# Patient Record
Sex: MALE | Race: WHITE | Employment: FULL TIME | URBAN - METROPOLITAN AREA
[De-identification: names, ages, dates, MRNs, and addresses within clinical notes are randomized per-mention and may not be internally consistent; named-entity substitution may affect disease eponyms.]

---

## 2020-10-11 ENCOUNTER — APPOINTMENT (OUTPATIENT)
Dept: GENERAL RADIOLOGY | Age: 65
DRG: 580 | End: 2020-10-11
Attending: EMERGENCY MEDICINE
Payer: COMMERCIAL

## 2020-10-11 ENCOUNTER — HOSPITAL ENCOUNTER (INPATIENT)
Age: 65
LOS: 5 days | Discharge: HOME HEALTH | DRG: 580 | End: 2020-10-16
Attending: EMERGENCY MEDICINE | Admitting: INTERNAL MEDICINE
Payer: COMMERCIAL

## 2020-10-11 DIAGNOSIS — N18.30 STAGE 3 CHRONIC KIDNEY DISEASE, UNSPECIFIED WHETHER STAGE 3A OR 3B CKD (HCC): ICD-10-CM

## 2020-10-11 DIAGNOSIS — L03.119 CELLULITIS AND ABSCESS OF HAND: ICD-10-CM

## 2020-10-11 DIAGNOSIS — L03.90 CELLULITIS, UNSPECIFIED CELLULITIS SITE: Primary | ICD-10-CM

## 2020-10-11 DIAGNOSIS — L02.519 CELLULITIS AND ABSCESS OF HAND: ICD-10-CM

## 2020-10-11 PROBLEM — I50.20 HFREF (HEART FAILURE WITH REDUCED EJECTION FRACTION) (HCC): Status: ACTIVE | Noted: 2020-10-11

## 2020-10-11 LAB
ALBUMIN SERPL-MCNC: 2.6 G/DL (ref 3.5–5)
ALBUMIN/GLOB SERPL: 0.7 {RATIO} (ref 1.1–2.2)
ALP SERPL-CCNC: 98 U/L (ref 45–117)
ALT SERPL-CCNC: 11 U/L (ref 12–78)
ANION GAP SERPL CALC-SCNC: 8 MMOL/L (ref 5–15)
AST SERPL-CCNC: 11 U/L (ref 15–37)
BASOPHILS # BLD: 0 K/UL (ref 0–0.1)
BASOPHILS NFR BLD: 1 % (ref 0–1)
BILIRUB SERPL-MCNC: 0.4 MG/DL (ref 0.2–1)
BUN SERPL-MCNC: 18 MG/DL (ref 6–20)
BUN/CREAT SERPL: 14 (ref 12–20)
CALCIUM SERPL-MCNC: 8.5 MG/DL (ref 8.5–10.1)
CHLORIDE SERPL-SCNC: 103 MMOL/L (ref 97–108)
CO2 SERPL-SCNC: 25 MMOL/L (ref 21–32)
COMMENT, HOLDF: NORMAL
CREAT SERPL-MCNC: 1.33 MG/DL (ref 0.7–1.3)
DIFFERENTIAL METHOD BLD: ABNORMAL
EOSINOPHIL # BLD: 0.1 K/UL (ref 0–0.4)
EOSINOPHIL NFR BLD: 2 % (ref 0–7)
ERYTHROCYTE [DISTWIDTH] IN BLOOD BY AUTOMATED COUNT: 12.9 % (ref 11.5–14.5)
GLOBULIN SER CALC-MCNC: 3.7 G/DL (ref 2–4)
GLUCOSE BLD STRIP.AUTO-MCNC: 131 MG/DL (ref 65–100)
GLUCOSE BLD STRIP.AUTO-MCNC: 210 MG/DL (ref 65–100)
GLUCOSE SERPL-MCNC: 267 MG/DL (ref 65–100)
HCT VFR BLD AUTO: 39.3 % (ref 36.6–50.3)
HGB BLD-MCNC: 13.5 G/DL (ref 12.1–17)
IMM GRANULOCYTES # BLD AUTO: 0 K/UL (ref 0–0.04)
IMM GRANULOCYTES NFR BLD AUTO: 1 % (ref 0–0.5)
LACTATE BLD-SCNC: 1.42 MMOL/L (ref 0.4–2)
LYMPHOCYTES # BLD: 1.3 K/UL (ref 0.8–3.5)
LYMPHOCYTES NFR BLD: 21 % (ref 12–49)
MCH RBC QN AUTO: 30.1 PG (ref 26–34)
MCHC RBC AUTO-ENTMCNC: 34.4 G/DL (ref 30–36.5)
MCV RBC AUTO: 87.7 FL (ref 80–99)
MONOCYTES # BLD: 0.7 K/UL (ref 0–1)
MONOCYTES NFR BLD: 12 % (ref 5–13)
NEUTS SEG # BLD: 3.9 K/UL (ref 1.8–8)
NEUTS SEG NFR BLD: 63 % (ref 32–75)
NRBC # BLD: 0 K/UL (ref 0–0.01)
NRBC BLD-RTO: 0 PER 100 WBC
PLATELET # BLD AUTO: 256 K/UL (ref 150–400)
PMV BLD AUTO: 9.7 FL (ref 8.9–12.9)
POTASSIUM SERPL-SCNC: 4.1 MMOL/L (ref 3.5–5.1)
PROT SERPL-MCNC: 6.3 G/DL (ref 6.4–8.2)
RBC # BLD AUTO: 4.48 M/UL (ref 4.1–5.7)
SAMPLES BEING HELD,HOLD: NORMAL
SERVICE CMNT-IMP: ABNORMAL
SERVICE CMNT-IMP: ABNORMAL
SODIUM SERPL-SCNC: 136 MMOL/L (ref 136–145)
WBC # BLD AUTO: 6.1 K/UL (ref 4.1–11.1)

## 2020-10-11 PROCEDURE — 65660000000 HC RM CCU STEPDOWN

## 2020-10-11 PROCEDURE — 36415 COLL VENOUS BLD VENIPUNCTURE: CPT

## 2020-10-11 PROCEDURE — 75810000289 HC I&D ABSCESS SIMP/COMP/MULT

## 2020-10-11 PROCEDURE — 99283 EMERGENCY DEPT VISIT LOW MDM: CPT

## 2020-10-11 PROCEDURE — 74011636637 HC RX REV CODE- 636/637: Performed by: INTERNAL MEDICINE

## 2020-10-11 PROCEDURE — 87077 CULTURE AEROBIC IDENTIFY: CPT

## 2020-10-11 PROCEDURE — 74011250637 HC RX REV CODE- 250/637: Performed by: INTERNAL MEDICINE

## 2020-10-11 PROCEDURE — 74011250636 HC RX REV CODE- 250/636: Performed by: EMERGENCY MEDICINE

## 2020-10-11 PROCEDURE — 0H9FXZZ DRAINAGE OF RIGHT HAND SKIN, EXTERNAL APPROACH: ICD-10-PCS | Performed by: EMERGENCY MEDICINE

## 2020-10-11 PROCEDURE — 73130 X-RAY EXAM OF HAND: CPT

## 2020-10-11 PROCEDURE — 82962 GLUCOSE BLOOD TEST: CPT

## 2020-10-11 PROCEDURE — 83605 ASSAY OF LACTIC ACID: CPT

## 2020-10-11 PROCEDURE — 87040 BLOOD CULTURE FOR BACTERIA: CPT

## 2020-10-11 PROCEDURE — 74011250636 HC RX REV CODE- 250/636: Performed by: INTERNAL MEDICINE

## 2020-10-11 PROCEDURE — 87205 SMEAR GRAM STAIN: CPT

## 2020-10-11 PROCEDURE — 80053 COMPREHEN METABOLIC PANEL: CPT

## 2020-10-11 PROCEDURE — 87186 SC STD MICRODIL/AGAR DIL: CPT

## 2020-10-11 PROCEDURE — 85025 COMPLETE CBC W/AUTO DIFF WBC: CPT

## 2020-10-11 PROCEDURE — 74011000250 HC RX REV CODE- 250: Performed by: INTERNAL MEDICINE

## 2020-10-11 RX ORDER — SULFAMETHOXAZOLE AND TRIMETHOPRIM 800; 160 MG/1; MG/1
1 TABLET ORAL 2 TIMES DAILY
COMMUNITY
Start: 2020-10-06 | End: 2020-10-16

## 2020-10-11 RX ORDER — ACETAMINOPHEN 325 MG/1
650 TABLET ORAL
Status: DISCONTINUED | OUTPATIENT
Start: 2020-10-11 | End: 2020-10-16 | Stop reason: HOSPADM

## 2020-10-11 RX ORDER — DULOXETIN HYDROCHLORIDE 60 MG/1
120 CAPSULE, DELAYED RELEASE ORAL
COMMUNITY

## 2020-10-11 RX ORDER — ROSUVASTATIN CALCIUM 10 MG/1
10 TABLET, COATED ORAL DAILY
Status: DISCONTINUED | OUTPATIENT
Start: 2020-10-11 | End: 2020-10-16 | Stop reason: HOSPADM

## 2020-10-11 RX ORDER — ACETAMINOPHEN 650 MG/1
650 SUPPOSITORY RECTAL
Status: DISCONTINUED | OUTPATIENT
Start: 2020-10-11 | End: 2020-10-16 | Stop reason: HOSPADM

## 2020-10-11 RX ORDER — CARVEDILOL 6.25 MG/1
6.25 TABLET ORAL 2 TIMES DAILY
COMMUNITY

## 2020-10-11 RX ORDER — INSULIN GLARGINE 100 [IU]/ML
25 INJECTION, SOLUTION SUBCUTANEOUS DAILY
Status: DISCONTINUED | OUTPATIENT
Start: 2020-10-12 | End: 2020-10-13

## 2020-10-11 RX ORDER — SODIUM CHLORIDE 0.9 % (FLUSH) 0.9 %
5-40 SYRINGE (ML) INJECTION EVERY 8 HOURS
Status: DISCONTINUED | OUTPATIENT
Start: 2020-10-11 | End: 2020-10-16 | Stop reason: HOSPADM

## 2020-10-11 RX ORDER — DEXTROSE 50 % IN WATER (D50W) INTRAVENOUS SYRINGE
12.5-25 AS NEEDED
Status: DISCONTINUED | OUTPATIENT
Start: 2020-10-11 | End: 2020-10-16 | Stop reason: HOSPADM

## 2020-10-11 RX ORDER — DOXYCYCLINE 100 MG/1
100 TABLET ORAL 2 TIMES DAILY
Status: ON HOLD | COMMUNITY
Start: 2020-10-08 | End: 2020-10-16 | Stop reason: SDUPTHER

## 2020-10-11 RX ORDER — DULOXETIN HYDROCHLORIDE 30 MG/1
120 CAPSULE, DELAYED RELEASE ORAL DAILY
Status: DISCONTINUED | OUTPATIENT
Start: 2020-10-11 | End: 2020-10-16 | Stop reason: HOSPADM

## 2020-10-11 RX ORDER — ROPINIROLE 1 MG/1
1 TABLET, FILM COATED ORAL
COMMUNITY

## 2020-10-11 RX ORDER — METRONIDAZOLE 500 MG/100ML
500 INJECTION, SOLUTION INTRAVENOUS EVERY 12 HOURS
Status: DISCONTINUED | OUTPATIENT
Start: 2020-10-11 | End: 2020-10-14

## 2020-10-11 RX ORDER — INSULIN DEGLUDEC INJECTION 100 U/ML
50 INJECTION, SOLUTION SUBCUTANEOUS EVERY EVENING
COMMUNITY

## 2020-10-11 RX ORDER — MAGNESIUM SULFATE 100 %
4 CRYSTALS MISCELLANEOUS AS NEEDED
Status: DISCONTINUED | OUTPATIENT
Start: 2020-10-11 | End: 2020-10-16 | Stop reason: HOSPADM

## 2020-10-11 RX ORDER — BUMETANIDE 1 MG/1
1 TABLET ORAL EVERY EVENING
COMMUNITY

## 2020-10-11 RX ORDER — POLYETHYLENE GLYCOL 3350 17 G/17G
17 POWDER, FOR SOLUTION ORAL DAILY PRN
Status: DISCONTINUED | OUTPATIENT
Start: 2020-10-11 | End: 2020-10-16 | Stop reason: HOSPADM

## 2020-10-11 RX ORDER — SODIUM CHLORIDE 0.9 % (FLUSH) 0.9 %
5-40 SYRINGE (ML) INJECTION AS NEEDED
Status: DISCONTINUED | OUTPATIENT
Start: 2020-10-11 | End: 2020-10-16 | Stop reason: HOSPADM

## 2020-10-11 RX ORDER — CARVEDILOL 12.5 MG/1
25 TABLET ORAL 2 TIMES DAILY WITH MEALS
Status: DISCONTINUED | OUTPATIENT
Start: 2020-10-11 | End: 2020-10-16 | Stop reason: HOSPADM

## 2020-10-11 RX ORDER — HYDROMORPHONE HYDROCHLORIDE 2 MG/ML
2 INJECTION, SOLUTION INTRAMUSCULAR; INTRAVENOUS; SUBCUTANEOUS
Status: COMPLETED | OUTPATIENT
Start: 2020-10-11 | End: 2020-10-11

## 2020-10-11 RX ORDER — INSULIN LISPRO 100 [IU]/ML
INJECTION, SOLUTION INTRAVENOUS; SUBCUTANEOUS
Status: DISCONTINUED | OUTPATIENT
Start: 2020-10-11 | End: 2020-10-16 | Stop reason: HOSPADM

## 2020-10-11 RX ORDER — VANCOMYCIN/0.9 % SOD CHLORIDE 1.5G/250ML
1500 PLASTIC BAG, INJECTION (ML) INTRAVENOUS EVERY 12 HOURS
Status: COMPLETED | OUTPATIENT
Start: 2020-10-12 | End: 2020-10-13

## 2020-10-11 RX ORDER — SACUBITRIL AND VALSARTAN 49; 51 MG/1; MG/1
1 TABLET, FILM COATED ORAL 2 TIMES DAILY
COMMUNITY
End: 2021-04-06

## 2020-10-11 RX ORDER — ROSUVASTATIN CALCIUM 10 MG/1
10 TABLET, COATED ORAL
Status: ON HOLD | COMMUNITY
End: 2021-09-02 | Stop reason: SDUPTHER

## 2020-10-11 RX ORDER — ROPINIROLE 1 MG/1
4 TABLET, FILM COATED ORAL
Status: DISCONTINUED | OUTPATIENT
Start: 2020-10-11 | End: 2020-10-16 | Stop reason: HOSPADM

## 2020-10-11 RX ORDER — ENOXAPARIN SODIUM 100 MG/ML
40 INJECTION SUBCUTANEOUS EVERY 24 HOURS
Status: DISCONTINUED | OUTPATIENT
Start: 2020-10-11 | End: 2020-10-16 | Stop reason: HOSPADM

## 2020-10-11 RX ADMIN — CEFEPIME HYDROCHLORIDE 2 G: 2 INJECTION, POWDER, FOR SOLUTION INTRAVENOUS at 16:12

## 2020-10-11 RX ADMIN — DULOXETINE HYDROCHLORIDE 120 MG: 30 CAPSULE, DELAYED RELEASE ORAL at 20:53

## 2020-10-11 RX ADMIN — SACUBITRIL AND VALSARTAN 1 TABLET: 49; 51 TABLET, FILM COATED ORAL at 21:22

## 2020-10-11 RX ADMIN — METRONIDAZOLE 500 MG: 500 INJECTION, SOLUTION INTRAVENOUS at 16:12

## 2020-10-11 RX ADMIN — HYDROMORPHONE HYDROCHLORIDE 2 MG: 2 INJECTION, SOLUTION INTRAMUSCULAR; INTRAVENOUS; SUBCUTANEOUS at 16:12

## 2020-10-11 RX ADMIN — ENOXAPARIN SODIUM 40 MG: 40 INJECTION SUBCUTANEOUS at 20:50

## 2020-10-11 RX ADMIN — Medication 10 ML: at 20:55

## 2020-10-11 RX ADMIN — INSULIN LISPRO 3 UNITS: 100 INJECTION, SOLUTION INTRAVENOUS; SUBCUTANEOUS at 18:23

## 2020-10-11 RX ADMIN — CARVEDILOL 25 MG: 12.5 TABLET, FILM COATED ORAL at 20:51

## 2020-10-11 RX ADMIN — ROSUVASTATIN CALCIUM 10 MG: 10 TABLET, COATED ORAL at 20:53

## 2020-10-11 RX ADMIN — VANCOMYCIN HYDROCHLORIDE 2000 MG: 10 INJECTION, POWDER, LYOPHILIZED, FOR SOLUTION INTRAVENOUS at 16:42

## 2020-10-11 RX ADMIN — ROPINIROLE HYDROCHLORIDE 4 MG: 1 TABLET, FILM COATED ORAL at 20:52

## 2020-10-11 NOTE — ROUTINE PROCESS
Bedside shift change report given to VICKI Arce (oncoming nurse) by Crystal Gilbert RN (offgoing nurse). Report included the following information SBAR, Kardex and Intake/Output.

## 2020-10-11 NOTE — PROGRESS NOTES
[facility] Dosing Services: Antimicrobial Stewardship Progress Note    Consult for antibiotic dosing of vancomycin by Dr. Giacomo Huang  Pharmacist reviewed antibiotic appropriateness for 60year old , male  for indication of spider bit cellulitis  Day of Therapy 1    Plan:  Vancomycin therapy:  Start Vancomycin therapy, with loading dose of 2gm then 1.5gm q12h. Dose calculated to approximate a therapeutic trough of  10-15mcg/mL. Plan for level: after 3rd maintenance dose  Pharmacy to follow daily and will make changes to dose and/or frequency based on clinical status. Date Dose & Interval Measured (mcg/mL) Extrapolated (mcg/mL)   ?10/11/20 ? 2gm x1 ? ?   ?10/12/20 ?1.5gm q12h ? ?   ? ? ? ? Other Antimicrobial  (not dosed by pharmacist)   Cefepime 2gm ivp q8h, metronidazole 500mg ivpb q12h   Cultures     Wound/blood cx's ordered   Serum Creatinine     Lab Results   Component Value Date/Time    Creatinine 1.33 (H) 10/11/2020 02:55 PM    Creatinine (POC) 1.1 07/01/2014 05:51 PM       Creatinine Clearance Estimated Creatinine Clearance: 71.5 mL/min (A) (based on SCr of 1.33 mg/dL (H)).      Procalcitonin  No results found for: PCT     Temp   98.3 °F (36.8 °C)    WBC   Lab Results   Component Value Date/Time    WBC 6.1 10/11/2020 02:55 PM       H/H   Lab Results   Component Value Date/Time    HGB 13.5 10/11/2020 02:55 PM      Platelets Lab Results   Component Value Date/Time    PLATELET 925 11/04/8481 02:55 PM            Pharmacist: LETY SparrowD Contact information:

## 2020-10-11 NOTE — ED TRIAGE NOTES
Pt here for what he believes was a spider bite to right hand. Seen at [facility] and Wednesday and given rocephin. Right hand in red, swollen, with large white blister to palm of hand. Denies known fevers.

## 2020-10-11 NOTE — PROGRESS NOTES
Admission Medication Reconciliation:     Information obtained from:    Patient  [address]:  YES    Comments/Recommendations:   Patient able to confirm name, , allergies, and preferred pharmacy  The patient is a poor historian as to his medications. Pharmacist called two pharmacies and spoke with the patient and his spouse to create the list below. The finalized list was reviewed with the patient. ¹[employer] pharmacy benefit data reflects medications filled and processed through the patient's insurance, however this data does NOT capture whether the medication was picked up or is currently being taken by the patient. Prior to Admission Medications   Prescriptions Last Dose Informant Taking? DULoxetine (Cymbalta) 60 mg capsule 10/10/2020 at Unknown time Self Yes   Sig: Take 120 mg by mouth daily. bumetanide (BUMEX) 1 mg tablet 10/10/2020 at Unknown time Self Yes   Sig: Take 1 mg by mouth daily. carvedilol (COREG PO) 10/10/2020 at Unknown time Self Yes   Sig: Take 25 mg by mouth two (2) times a day. doxycycline (ADOXA) 100 mg tablet 10/10/2020 at Unknown time Self Yes   Sig: Take 100 mg by mouth two (2) times a day. empagliflozin (Jardiance) 25 mg tablet 10/10/2020 at Unknown time Self Yes   Sig: Take 25 mg by mouth daily. insulin degludec [place] Slight FlexTouch U-100) 100 unit/mL (3 mL) inpn 10/11/2020 at am Self Yes   Si Units by SubCUTAneous route daily. rOPINIRole (Requip) 1 mg tablet 10/10/2020 at Unknown time Self Yes   Sig: Take 4 mg by mouth nightly. rosuvastatin (CRESTOR) 10 mg tablet 10/10/2020 at Unknown time Self Yes   Sig: Take 10 mg by mouth daily. sacubitriL-valsartan (Entresto) 49-51 mg tab tablet 10/10/2020 at Unknown time Self Yes   Sig: Take 1 Tab by mouth two (2) times a day.    trimethoprim-sulfamethoxazole (Bactrim DS) 160-800 mg per tablet 10/10/2020 at Unknown time Self Yes   Sig: Take 1 Tab by mouth two (2) times a day.      Facility-Administered Medications: None          Please contact the main inpatient pharmacy with any questions or concerns at [phone] and we will direct you to the clinical pharmacist covering this patient's care while in-house.    Marylu Garland, JeannetteD, BCPS

## 2020-10-11 NOTE — H&P
[facility]  [address] [address]  [phone]    Hospitalist Admission Note      NAME:  Yazan Rae   :   1955   MRN:  [id number]     PCP:  Petra Sotelo MD     Date of Service/Time:  10/11/2020 4:40 PM         Assessment / Plan:       60 y.o. male with hx of HFrEF, HTN, DM, CKD3 presenting with worsening R hand cellulitis failing outpatient abx. Cellulitis and abscess of hand: R palmar puncture. Pt thought it might have been a spider bite or nail puncture as he was working in the yard picking up piles of wood. No improvement despite IM CTX x 2, Bactrim, followed by doxycycline. Bedside I&D done by ED attending, follow up on wound cultures. Start IV vanc, cefepime, and Flagyl. MRI hand to rule out tenosynovitis and less likely osteomyelitis. Ortho consult      HFrEF (heart failure with reduced ejection fraction): per pt, EF ~15-20% however has not been checked in years. No recent CP, SOB, LE edema, orthopnea, PND and appears stable. Check EKG; continue Entresto, and BB (once dose confirmed by pharmacy, as pt does not know). If surgical intervention is necessary, consider cardiology consult for pre-op evaluation      Type II diabetes mellitus: appears uncontrolled. Takes Tresiba 50 units at home, will use Lantus 25 units here, and titrate as appropriate. Start SSI and send A1c      HTN (hypertension): BP controlled. On Entresto, and Coreg (according to pt, however he does not know dose). Pharmacy consult for medication reconciliation       CKD (chronic kidney disease) stage 3, GFR 30-59 ml/min ():stable, follow on IV abx. Renally dose meds. Avoid nephrotoxins      Depression and anxiety: resume home Cymbalta once dose verified      Unspecified sleep apnea / obesity: lost 70 lbs and not on CPAP. Body mass index is 32.55 kg/m². Code Status: FULL     Surrogate decision maker: wife      ED notes, lab results, and imaging studies reviewed. Total time spent with patient: 79 Minutes   Time spent in the care of this patient included reviewing records, discussing with nursing, obtaining history and examining the patient, and discussing treatment plans, with >50% time spent counseling/coordinating care    Risk of deterioration: High                 Care Plan discussed with: ED provider, Patient, Family, Nursing Staff and >50% of time spent in counseling and coordination of care    Discussed:  Care Plan and D/C Planning    Prophylaxis:  Lovenox    Disposition:  Home w/Family                 Subjective:     CHIEF COMPLAINT: R hand swelling and redness    HISTORY OF PRESENT ILLNESS:     Mr. Deandre Yepez is a 60 y.o. male w/ hx of HFrEF, HTN, DM, CKD3 who presents with right hand cellulitis. Started ~8 days ago, after working in the yard. Felt a sharp pinch in his right palm and saw a spider crawling by. Alternatively it could have been a nail. Did get a tetanus shot at urgent care. According to pt, he was given IM CTX 250mg, followed by 2gm the following day. Discharged home on Bactrim, and with no improvement later added doxycycline. Due to worsening of right hand swelling presented to ED for further evaluation. Does report chills but no fevers. Having trouble making a fist, associated with pain and no significant drainage. ED workup included R hand XR showing soft tissue swelling and anterior soft tissue gas. Wound was I&D'd by ER attending; cultures sent. Mr. Deandre Yepez is admitted for further evaluation and management.       Past Medical History:   Diagnosis Date    Diabetes ([facility]     Type 2    Diabetic foot ulcer ([facility] 7/2/2014    Foot ulcer due to secondary DM ([address]     Hypertension     Psychiatric disorder     depression and anxiety    Restless leg syndrome     Streptococcal bacteremia     elbow    Type II or unspecified type diabetes mellitus without mention of complication, uncontrolled ([address] 7/2/2014    Unspecified sleep apnea     lost weight        Past Surgical History:   Procedure Laterality Date    HX ORTHOPAEDIC      bone spur right foot.  HX UROLOGICAL      hydrocele repair left testicle    HX WISDOM TEETH EXTRACTION         Social History     Tobacco Use    Smoking status: Current Some Day Smoker     Packs/day: 0.25     Years: 20.00     Pack years: 5.00    Tobacco comment: cigars   Substance Use Topics    Alcohol use: Yes     Alcohol/week: 3.3 standard drinks     Types: 4 Cans of beer per week        Family History: family history of DM    Allergies   Allergen Reactions    Penicillins Hives        Prior to Admission medications    Medication Sig Start Date End Date Taking? Authorizing Provider   amLODIPine (NORVASC) 10 mg tablet Take 1 Tab by mouth daily. 7/14/14   Gilles Ayoub MD   glipiZIDE (GLUCOTROL) 5 mg tablet Take 1 Tab by mouth Daily (before breakfast). 7/14/14   Gilles Ayoub MD   hydrALAZINE (APRESOLINE) 25 mg tablet Take 1 Tab by mouth three (3) times daily. 7/14/14   Gilles Ayoub MD   oxyCODONE-acetaminophen (PERCOCET) 5-325 mg per tablet Take 2 Tabs by mouth every four (4) hours as needed. 7/14/14   Gilles Ayoub MD   DULoxetine (CYMBALTA) 30 mg capsule Take 90 mg by mouth daily. Other, MD Jhonny   rOPINIRole (REQUIP) 0.5 mg tablet Take 1.5 mg by mouth nightly. Other, MD Jhonny   dextroamphetamine-amphetamine (ADDERALL) 10 mg tablet Take 20 mg by mouth two (2) times a day. Provider, Historical   insulin glargine (LANTUS) 100 unit/mL injection 30 Units by SubCUTAneous route nightly.     Provider, Historical       Review of Systems:  (bold if positive, if negative)    Gen:  chills, fatigueEyes:  ENT:  CVS:  Pulm:  GI:  :  MS:  PainweaknessswellingSkin:  erythemawoundPsych:  Endo:  Hem:  Renal:  Neuro:            Objective:      VITALS:    Vital signs reviewed; most recent are:    Visit Vitals  /82 (BP 1 Location: Left arm, BP Patient Position: Sitting)   Pulse 75   Temp 98.3 °F (36.8 °C)   Resp 15 Ht 6' (1.829 m)   Wt 108.9 kg (240 lb)   SpO2 97%   BMI 32.55 kg/m²     SpO2 Readings from Last 6 Encounters:   10/11/20 97%   07/14/14 93%        No intake or output data in the 24 hours ending 10/11/20 1640         Exam:     Physical Exam:    Gen:  Well-developed, well-nourished, in no acute distress. Pleasant. Wife at bedside  HEENT:  No scleral icterus, hearing intact to voice  Neck:  Supple, without masses  Resp:  No accessory muscle use. CTAB without wheezing, rales, rhonchi  Card: RRR. Normal S1 and S2 without murmurs, rubs, or gallops. No peripheral lower extremity edema. No JVD. Peripheral pulses in tact. Abd:  Normoactive bowel sounds. Soft, non-tender, non-distended. No rebound, no guarding. No appreciable hepatosplenomegaly   Musc: R hand swelling, ROM difficulties with flexion of fingers, especially R 5th digit  Skin:                 Neuro:  Cranial nerves are grossly intact, no focal motor weakness, follows commands appropriately  Psych:  Good insight, normal affect. Alert, oriented x 3. Answers questions appropriately       Labs:    Recent Labs     10/11/20  1455   WBC 6.1   HGB 13.5   HCT 39.3        Recent Labs     10/11/20  1455      K 4.1      CO2 25   *   BUN 18   CREA 1.33*   CA 8.5   ALB 2.6*   ALT 11*     No components found for: GLPOC  No results for input(s): PH, PCO2, PO2, HCO3, FIO2 in the last 72 hours. No results for input(s): INR, INREXT in the last 72 hours. No results found for: SDES  Lab Results   Component Value Date/Time    Culture result: NO ANAEROBES ISOLATED 07/08/2014 08:37 AM    Culture result: HEAVY 07/08/2014 08:37 AM    Culture result: METHICILLIN RESISTANT STAPHYLOCOCCUS AUREUS 07/08/2014 08:37 AM     All other current labs reviewed in the computer. Imaging/Studies:    Xr Hand Rt Min 3 V    Result Date: 10/11/2020  IMPRESSION: Soft tissue swelling and anterior soft tissue gas. No fracture or osteomyelitis.     Imaging personally reviewed.     ___________________________________________________    Attending Physician: Candi Bustos MD

## 2020-10-11 NOTE — ED NOTES
TRANSFER - OUT REPORT:    Verbal report given to Aria RN (name) on Yazan Rae  being transferred to 52(unit) for routine progression of care       Report consisted of patients Situation, Background, Assessment and   Recommendations(SBAR). Information from the following report(s) SBAR and MAR was reviewed with the receiving nurse. Lines:   Peripheral IV 10/11/20 Right Forearm (Active)   Site Assessment Clean, dry, & intact 10/11/20 1501   Dressing Status Clean, dry, & intact 10/11/20 1501        Opportunity for questions and clarification was provided.

## 2020-10-11 NOTE — ED PROVIDER NOTES
HPI the patient had a spider bite to his right palm 6 days ago and it has subsequently become infected. [facility], given Rocephin and started on Bactrim. 3 days ago he went back and was given another injection of Rocephin and doxycycline was added. He has had worsening swelling and pain of the right palm since then. He denies having fever or vomiting. Past Medical History:   Diagnosis Date    Diabetes ([facility]     Type 2    Diabetic foot ulcer ([facility] 7/2/2014    Foot ulcer due to secondary DM ([facility]     Hypertension     Psychiatric disorder     depression and anxiety    Restless leg syndrome     Streptococcal bacteremia     elbow    Type II or unspecified type diabetes mellitus without mention of complication, uncontrolled ([facility] 7/2/2014    Unspecified sleep apnea     lost weight       Past Surgical History:   Procedure Laterality Date    HX ORTHOPAEDIC      bone spur right foot.  HX UROLOGICAL      hydrocele repair left testicle    HX WISDOM TEETH EXTRACTION           No family history on file. Social History     Socioeconomic History    Marital status:      Spouse name: Not on file    Number of children: Not on file    Years of education: Not on file    Highest education level: Not on file   Occupational History    Not on file   Social Needs    Financial resource strain: Not on file    Food insecurity     Worry: Not on file     Inability: Not on file    Transportation needs     Medical: Not on file     Non-medical: Not on file   Tobacco Use    Smoking status: Current Some Day Smoker     Packs/day: 0.25     Years: 20.00     Pack years: 5.00    Tobacco comment: cigars   Substance and Sexual Activity    Alcohol use:  Yes     Alcohol/week: 3.3 standard drinks     Types: 4 Cans of beer per week    Drug use: No    Sexual activity: Not on file   Lifestyle    Physical activity     Days per week: Not on file     Minutes per session: Not on file    Stress: Not on file   Relationships    Social connections     Talks on phone: Not on file     Gets together: Not on file     Attends Yarsanism service: Not on file     Active member of club or organization: Not on file     Attends meetings of clubs or organizations: Not on file     Relationship status: Not on file    Intimate partner violence     Fear of current or ex partner: Not on file     Emotionally abused: Not on file     Physically abused: Not on file     Forced sexual activity: Not on file   Other Topics Concern    Not on file   Social History Narrative    Not on file         ALLERGIES: Penicillins    Review of Systems   Constitutional: Negative for fever. HENT: Negative for voice change. Eyes: Negative for pain. Respiratory: Negative for cough and shortness of breath. Cardiovascular: Negative for chest pain. Gastrointestinal: Negative for abdominal pain, nausea and vomiting. Genitourinary: Negative for flank pain. Musculoskeletal: Negative for back pain. Skin: Positive for color change. Negative for rash. Neurological: Negative for headaches. Psychiatric/Behavioral: Negative for confusion. Vitals:    10/11/20 1415   BP: 139/82   Pulse: 75   Resp: 15   Temp: 98.3 °F (36.8 °C)   SpO2: 97%   Weight: 108.9 kg (240 lb)   Height: 6' (1.829 m)            Physical Exam  Constitutional:       General: He is not in acute distress. Appearance: He is well-developed. HENT:      Head: Normocephalic. Neck:      Musculoskeletal: Normal range of motion. Cardiovascular:      Rate and Rhythm: Normal rate. Pulmonary:      Effort: Pulmonary effort is normal.   Musculoskeletal: Normal range of motion. Skin:     General: Skin is warm and dry. Capillary Refill: Capillary refill takes less than 2 seconds. Comments: There is redness of the right palm with a 2 inch x 1 inch area of pus. There is mild to moderate swelling of the palm and hand dorsum and it is warm to touch. Neurological:      Mental Status: He is alert. Psychiatric:         Behavior: Behavior normal.          [facility]       I&D Abcess Simple    Date/Time: 10/11/2020 3:19 PM  Performed by: Momo Howard MD  Authorized by: Momo Howard MD     Consent:     Consent obtained:  Verbal    Consent given by:  Patient  Location:     Type:  Fluid collection  Pre-procedure details:     Skin preparation:  Betadine  Anesthesia (see MAR for exact dosages): Anesthesia method:  None  Procedure type:     Complexity:  Simple  Procedure details:     Incision types:  Single straight    Drainage:  Purulent    Drainage amount:  Scant    Wound treatment:  Wound left open  Post-procedure details:     Patient tolerance of procedure: Tolerated well, no immediate complications      Perfect Serve Consult for Admission  3:37 PM    ED Room Number: ER19/19  Patient Name and age:  Madhavi Joseph 60 y.o.  male  Working Diagnosis:   1. Cellulitis, unspecified cellulitis site        COVID-19 Suspicion:  no  Sepsis present:  no  Reassessment needed: no  Code Status:  Full Code  Readmission: no  Isolation Requirements:  no  Recommended Level of Care:  med/surg  Department:[facility] ED - [phone]  Other:  Pt is dm. Thinks a spider bit him in r palm 6 days ago . Has been given 2 im shots [facility] by [facility] and bactrim /doxy. Hand is worse.

## 2020-10-12 ENCOUNTER — APPOINTMENT (OUTPATIENT)
Dept: CT IMAGING | Age: 65
DRG: 580 | End: 2020-10-12
Attending: PHYSICIAN ASSISTANT
Payer: COMMERCIAL

## 2020-10-12 ENCOUNTER — APPOINTMENT (OUTPATIENT)
Dept: MRI IMAGING | Age: 65
DRG: 580 | End: 2020-10-12
Attending: INTERNAL MEDICINE
Payer: COMMERCIAL

## 2020-10-12 ENCOUNTER — ANESTHESIA EVENT (OUTPATIENT)
Dept: SURGERY | Age: 65
DRG: 580 | End: 2020-10-12
Payer: COMMERCIAL

## 2020-10-12 LAB
ALBUMIN SERPL-MCNC: 2.5 G/DL (ref 3.5–5)
ALBUMIN/GLOB SERPL: 0.7 {RATIO} (ref 1.1–2.2)
ALP SERPL-CCNC: 99 U/L (ref 45–117)
ALT SERPL-CCNC: 11 U/L (ref 12–78)
ANION GAP SERPL CALC-SCNC: 5 MMOL/L (ref 5–15)
AST SERPL-CCNC: 15 U/L (ref 15–37)
BASOPHILS # BLD: 0 K/UL (ref 0–0.1)
BASOPHILS NFR BLD: 1 % (ref 0–1)
BILIRUB SERPL-MCNC: 0.4 MG/DL (ref 0.2–1)
BUN SERPL-MCNC: 20 MG/DL (ref 6–20)
BUN/CREAT SERPL: 18 (ref 12–20)
CALCIUM SERPL-MCNC: 8.2 MG/DL (ref 8.5–10.1)
CHLORIDE SERPL-SCNC: 103 MMOL/L (ref 97–108)
CO2 SERPL-SCNC: 26 MMOL/L (ref 21–32)
CREAT SERPL-MCNC: 1.13 MG/DL (ref 0.7–1.3)
CRP SERPL-MCNC: 9 MG/DL (ref 0–0.6)
DIFFERENTIAL METHOD BLD: ABNORMAL
EOSINOPHIL # BLD: 0 K/UL (ref 0–0.4)
EOSINOPHIL NFR BLD: 1 % (ref 0–7)
ERYTHROCYTE [DISTWIDTH] IN BLOOD BY AUTOMATED COUNT: 12.4 % (ref 11.5–14.5)
ERYTHROCYTE [SEDIMENTATION RATE] IN BLOOD: 75 MM/HR (ref 0–20)
EST. AVERAGE GLUCOSE BLD GHB EST-MCNC: 220 MG/DL
GLOBULIN SER CALC-MCNC: 3.7 G/DL (ref 2–4)
GLUCOSE BLD STRIP.AUTO-MCNC: 114 MG/DL (ref 65–100)
GLUCOSE BLD STRIP.AUTO-MCNC: 117 MG/DL (ref 65–100)
GLUCOSE BLD STRIP.AUTO-MCNC: 190 MG/DL (ref 65–100)
GLUCOSE BLD STRIP.AUTO-MCNC: 201 MG/DL (ref 65–100)
GLUCOSE SERPL-MCNC: 128 MG/DL (ref 65–100)
HBA1C MFR BLD: 9.3 % (ref 4–5.6)
HCT VFR BLD AUTO: 38.8 % (ref 36.6–50.3)
HGB BLD-MCNC: 13.1 G/DL (ref 12.1–17)
IMM GRANULOCYTES # BLD AUTO: 0.1 K/UL (ref 0–0.04)
IMM GRANULOCYTES NFR BLD AUTO: 1 % (ref 0–0.5)
LYMPHOCYTES # BLD: 1 K/UL (ref 0.8–3.5)
LYMPHOCYTES NFR BLD: 12 % (ref 12–49)
MAGNESIUM SERPL-MCNC: 2.1 MG/DL (ref 1.6–2.4)
MCH RBC QN AUTO: 30.2 PG (ref 26–34)
MCHC RBC AUTO-ENTMCNC: 33.8 G/DL (ref 30–36.5)
MCV RBC AUTO: 89.4 FL (ref 80–99)
MONOCYTES # BLD: 0.7 K/UL (ref 0–1)
MONOCYTES NFR BLD: 9 % (ref 5–13)
NEUTS SEG # BLD: 5.9 K/UL (ref 1.8–8)
NEUTS SEG NFR BLD: 76 % (ref 32–75)
NRBC # BLD: 0 K/UL (ref 0–0.01)
NRBC BLD-RTO: 0 PER 100 WBC
PHOSPHATE SERPL-MCNC: 3.2 MG/DL (ref 2.6–4.7)
PLATELET # BLD AUTO: 255 K/UL (ref 150–400)
PMV BLD AUTO: 9.3 FL (ref 8.9–12.9)
POTASSIUM SERPL-SCNC: 4.1 MMOL/L (ref 3.5–5.1)
PROT SERPL-MCNC: 6.2 G/DL (ref 6.4–8.2)
RBC # BLD AUTO: 4.34 M/UL (ref 4.1–5.7)
SERVICE CMNT-IMP: ABNORMAL
SODIUM SERPL-SCNC: 134 MMOL/L (ref 136–145)
WBC # BLD AUTO: 7.6 K/UL (ref 4.1–11.1)

## 2020-10-12 PROCEDURE — 74011250637 HC RX REV CODE- 250/637: Performed by: INTERNAL MEDICINE

## 2020-10-12 PROCEDURE — 73201 CT UPPER EXTREMITY W/DYE: CPT

## 2020-10-12 PROCEDURE — 82962 GLUCOSE BLOOD TEST: CPT

## 2020-10-12 PROCEDURE — 65660000000 HC RM CCU STEPDOWN

## 2020-10-12 PROCEDURE — 84100 ASSAY OF PHOSPHORUS: CPT

## 2020-10-12 PROCEDURE — 77030010777 HC CRDL FT DERY -B

## 2020-10-12 PROCEDURE — 74011636637 HC RX REV CODE- 636/637: Performed by: INTERNAL MEDICINE

## 2020-10-12 PROCEDURE — 74011250636 HC RX REV CODE- 250/636: Performed by: INTERNAL MEDICINE

## 2020-10-12 PROCEDURE — 36415 COLL VENOUS BLD VENIPUNCTURE: CPT

## 2020-10-12 PROCEDURE — 74011000636 HC RX REV CODE- 636: Performed by: RADIOLOGY

## 2020-10-12 PROCEDURE — 83036 HEMOGLOBIN GLYCOSYLATED A1C: CPT

## 2020-10-12 PROCEDURE — 83735 ASSAY OF MAGNESIUM: CPT

## 2020-10-12 PROCEDURE — 80053 COMPREHEN METABOLIC PANEL: CPT

## 2020-10-12 PROCEDURE — 85025 COMPLETE CBC W/AUTO DIFF WBC: CPT

## 2020-10-12 PROCEDURE — 86140 C-REACTIVE PROTEIN: CPT

## 2020-10-12 PROCEDURE — 74011000250 HC RX REV CODE- 250: Performed by: INTERNAL MEDICINE

## 2020-10-12 PROCEDURE — 85652 RBC SED RATE AUTOMATED: CPT

## 2020-10-12 RX ORDER — BUMETANIDE 1 MG/1
1 TABLET ORAL DAILY
Status: DISCONTINUED | OUTPATIENT
Start: 2020-10-12 | End: 2020-10-16 | Stop reason: HOSPADM

## 2020-10-12 RX ADMIN — CARVEDILOL 25 MG: 12.5 TABLET, FILM COATED ORAL at 08:25

## 2020-10-12 RX ADMIN — BUMETANIDE 1 MG: 1 TABLET ORAL at 12:07

## 2020-10-12 RX ADMIN — VANCOMYCIN HYDROCHLORIDE 1500 MG: 10 INJECTION, POWDER, LYOPHILIZED, FOR SOLUTION INTRAVENOUS at 17:37

## 2020-10-12 RX ADMIN — METRONIDAZOLE 500 MG: 500 INJECTION, SOLUTION INTRAVENOUS at 04:06

## 2020-10-12 RX ADMIN — ACETAMINOPHEN 650 MG: 325 TABLET ORAL at 08:29

## 2020-10-12 RX ADMIN — IOPAMIDOL 100 ML: 612 INJECTION, SOLUTION INTRAVENOUS at 15:34

## 2020-10-12 RX ADMIN — CEFEPIME HYDROCHLORIDE 2 G: 2 INJECTION, POWDER, FOR SOLUTION INTRAVENOUS at 08:25

## 2020-10-12 RX ADMIN — DULOXETINE HYDROCHLORIDE 120 MG: 30 CAPSULE, DELAYED RELEASE ORAL at 21:30

## 2020-10-12 RX ADMIN — ROPINIROLE HYDROCHLORIDE 4 MG: 1 TABLET, FILM COATED ORAL at 21:30

## 2020-10-12 RX ADMIN — CEFEPIME HYDROCHLORIDE 2 G: 2 INJECTION, POWDER, FOR SOLUTION INTRAVENOUS at 16:28

## 2020-10-12 RX ADMIN — INSULIN LISPRO 3 UNITS: 100 INJECTION, SOLUTION INTRAVENOUS; SUBCUTANEOUS at 17:00

## 2020-10-12 RX ADMIN — METRONIDAZOLE 500 MG: 500 INJECTION, SOLUTION INTRAVENOUS at 16:28

## 2020-10-12 RX ADMIN — ROSUVASTATIN CALCIUM 10 MG: 10 TABLET, COATED ORAL at 21:30

## 2020-10-12 RX ADMIN — ENOXAPARIN SODIUM 40 MG: 40 INJECTION SUBCUTANEOUS at 21:30

## 2020-10-12 RX ADMIN — SACUBITRIL AND VALSARTAN 1 TABLET: 49; 51 TABLET, FILM COATED ORAL at 21:34

## 2020-10-12 RX ADMIN — Medication 10 ML: at 00:11

## 2020-10-12 RX ADMIN — SACUBITRIL AND VALSARTAN 1 TABLET: 49; 51 TABLET, FILM COATED ORAL at 08:29

## 2020-10-12 RX ADMIN — CEFEPIME HYDROCHLORIDE 2 G: 2 INJECTION, POWDER, FOR SOLUTION INTRAVENOUS at 00:10

## 2020-10-12 RX ADMIN — VANCOMYCIN HYDROCHLORIDE 1500 MG: 10 INJECTION, POWDER, LYOPHILIZED, FOR SOLUTION INTRAVENOUS at 04:06

## 2020-10-12 RX ADMIN — Medication 10 ML: at 21:30

## 2020-10-12 RX ADMIN — CARVEDILOL 25 MG: 12.5 TABLET, FILM COATED ORAL at 16:28

## 2020-10-12 NOTE — PROGRESS NOTES
Patient has [address] defibrillator. Paperwork was faxed to Dr. Evelio Dubon at [address] [employer] today and awaiting to have cardiology form back. Pacemaker rep has to be presented to intergrade his device. Primary nurse was informed.

## 2020-10-12 NOTE — PROGRESS NOTES
Bedside and Verbal shift change report given to Dorothy RN (oncoming nurse) by Alessia Belle RN (offgoing nurse). Report included the following information SBAR, Kardex, MAR, Accordion and Recent Results.

## 2020-10-12 NOTE — CONSULTS
ORTHOPEDIC SURGERY CONSULT    Subjective:     Date of Consultation:  October 12, 2020    Referring Physician:  Dr. Dayna Lee is a 60 y.o. male who is being seen for right volar hand infection. He is a right hand dominant male. He presented to the [facility] yesterday complaining of worsening erythema of the right volar hand after a potential spider bite verus foreign body 10 days ago. He has a past medical history of CKD stage 3, DM-2, abscess of neck requiring I and D, HTN, diabetic foot ulcer, HRrEF, and depression/anxiety. Patient apparently was helping a friend move some furniture 10 days ago when his right hand got punctured by something. Patient does not know it is was a nail, but said he saw a spider walk away from where he was grabbing the furniture. He reports worsening erythema that occurred within 24 hours of the injury. He was seen by an outpatient clinic and had IM ceftriaxone x 2, bactrim and then doxycycline. There was no improvement in his hand erythema or function. He was admitted to the [facility] last night after superficial I and D by ER attending. Patient Active Problem List    Diagnosis Date Noted    HFrEF (heart failure with reduced ejection fraction) ([facility] 10/11/2020    Cellulitis and abscess of hand 10/11/2020    Psychiatric disorder     Unspecified sleep apnea     CKD (chronic kidney disease) stage 3, GFR 30-59 ml/min     Cellulitis of scalp 07/02/2014    Type II diabetes mellitus ([facility] 07/02/2014    HTN (hypertension) 07/02/2014    RLS (restless legs syndrome) 07/02/2014    Diabetic foot ulcer ([facility] 07/02/2014     No family history on file. Social History     Tobacco Use    Smoking status: Current Some Day Smoker     Packs/day: 0.25     Years: 20.00     Pack years: 5.00    Tobacco comment: cigars   Substance Use Topics    Alcohol use:  Yes     Alcohol/week: 3.3 standard drinks     Types: 4 Cans of beer per week     Past Medical History:   Diagnosis Date    Diabetes ([facility]     Type 2    Diabetic foot ulcer ([facility] 7/2/2014    Foot ulcer due to secondary DM ([facility]     Hypertension     Psychiatric disorder     depression and anxiety    Restless leg syndrome     Streptococcal bacteremia     elbow    Type II or unspecified type diabetes mellitus without mention of complication, uncontrolled ([facility] 7/2/2014    Unspecified sleep apnea     lost weight      Past Surgical History:   Procedure Laterality Date    HX ORTHOPAEDIC      bone spur right foot.  HX UROLOGICAL      hydrocele repair left testicle    HX WISDOM TEETH EXTRACTION        Prior to Admission medications    Medication Sig Start Date End Date Taking? Authorizing Provider   DULoxetine (Cymbalta) 60 mg capsule Take 120 mg by mouth daily. Yes Provider, Historical   insulin degludec Donnie Canary Calendars FlexTouch U-100) 100 unit/mL (3 mL) inpn 48 Units by SubCUTAneous route daily. Yes Provider, Historical   empagliflozin (Jardiance) 25 mg tablet Take 25 mg by mouth daily. Yes Provider, Historical   sacubitriL-valsartan (Entresto) 49-51 mg tab tablet Take 1 Tab by mouth two (2) times a day. Yes Provider, Historical   carvedilol (COREG PO) Take 25 mg by mouth two (2) times a day. Yes Provider, Historical   bumetanide (BUMEX) 1 mg tablet Take 1 mg by mouth daily. Yes Provider, Historical   rosuvastatin (CRESTOR) 10 mg tablet Take 10 mg by mouth daily. Yes Provider, Historical   trimethoprim-sulfamethoxazole (Bactrim DS) 160-800 mg per tablet Take 1 Tab by mouth two (2) times a day. 10/6/20  Yes Provider, Historical   doxycycline (ADOXA) 100 mg tablet Take 100 mg by mouth two (2) times a day. 10/8/20  Yes Provider, Historical   rOPINIRole (Requip) 1 mg tablet Take 4 mg by mouth nightly.    Yes Provider, Historical     Current Facility-Administered Medications   Medication Dose Route Frequency    bumetanide (BUMEX) tablet 1 mg  1 mg Oral DAILY    [START ON 10/13/2020] Vancomycin trough 10/13 prior to 0500 dose Other ONCE    cefepime (MAXIPIME) 2 g in sterile water (preservative free) 10 mL IV syringe  2 g IntraVENous Q8H    metroNIDAZOLE (FLAGYL) IVPB premix 500 mg  500 mg IntraVENous Q12H    vancomycin (VANCOCIN) 1500 mg in  ml infusion  1,500 mg IntraVENous Q12H    sodium chloride (NS) flush 5-40 mL  5-40 mL IntraVENous Q8H    sodium chloride (NS) flush 5-40 mL  5-40 mL IntraVENous PRN    acetaminophen (TYLENOL) tablet 650 mg  650 mg Oral Q6H PRN    Or    acetaminophen (TYLENOL) suppository 650 mg  650 mg Rectal Q6H PRN    polyethylene glycol (MIRALAX) packet 17 g  17 g Oral DAILY PRN    insulin lispro (HUMALOG) injection   SubCUTAneous QID WITH MEALS    glucose chewable tablet 16 g  4 Tab Oral PRN    dextrose (D50W) injection syrg 12.5-25 g  12.5-25 g IntraVENous PRN    glucagon (GLUCAGEN) injection 1 mg  1 mg IntraMUSCular PRN    sacubitriL-valsartan (ENTRESTO) 49-51 mg tablet 1 Tab  1 Tab Oral Q12H    insulin glargine (LANTUS) injection 25 Units  25 Units SubCUTAneous DAILY    enoxaparin (LOVENOX) injection 40 mg  40 mg SubCUTAneous Q24H    rOPINIRole (REQUIP) tablet 4 mg  4 mg Oral QHS    rosuvastatin (CRESTOR) tablet 10 mg  10 mg Oral DAILY    DULoxetine (CYMBALTA) capsule 120 mg  120 mg Oral DAILY    carvediloL (COREG) tablet 25 mg  25 mg Oral BID WITH MEALS     Allergies   Allergen Reactions    Penicillins Hives        Review of Systems:  Pertinent items are noted in HPI. Objective:     Patient Vitals for the past 8 hrs:   BP Temp Pulse Resp SpO2   10/12/20 1154 128/69 97.7 °F (36.5 °C) (!) 54 18 98 %   10/12/20 0742 (!) 148/72 97.5 °F (36.4 °C) 60 18 98 %   10/12/20 0700   (!) 57       Temp (24hrs), Av.2 °F (36.8 °C), Min:97.5 °F (36.4 °C), Max:98.9 °F (37.2 °C)        EXAM: GEN: Well appearing male in NAD  PSYCH:  AAO x 4  MUSC/INTEG/NEURO: right hand with volar superificial abscess. There is a small pustule medial to main area of fluctuance.   There is significant erythema in the palmar surface of the hand with expansion to the small finger PIP volarly. Digits 2-5 held in flexion. There is diffuse hand edema with fusiform swelling of 2-5. No wrist effusion. Thenar eminance is unremarkable. Hypothenar is tense, but compressible and no palpable area of fluctuance. Can not express fluid from superificial I and D site. Moderate pain with passive extension of the 5th digit. There is loss of sensation over the ulnar aspect of the palm. BCR of all digits. COMPARISON: None.     FINDINGS: Three views of the right hand demonstrate diffuse soft tissue  swelling. Soft tissue gas is anterior to the third, fourth, and fifth  metacarpals. Normal bone mineralization. No fracture or cortical erosion. Joints  are within normal limits.     IMPRESSION  IMPRESSION:      Soft tissue swelling and anterior soft tissue gas. No fracture or osteomyelitis. Data Review   Recent Results (from the past 24 hour(s))   POC LACTIC ACID    Collection Time: 10/11/20  2:51 PM   Result Value Ref Range    Lactic Acid (POC) 1.42 0.40 - 2.00 mmol/L   CBC WITH AUTOMATED DIFF    Collection Time: 10/11/20  2:55 PM   Result Value Ref Range    WBC 6.1 4.1 - 11.1 K/uL    RBC 4.48 4.10 - 5.70 M/uL    HGB 13.5 12.1 - 17.0 g/dL    HCT 39.3 36.6 - 50.3 %    MCV 87.7 80.0 - 99.0 FL    MCH 30.1 26.0 - 34.0 PG    MCHC 34.4 30.0 - 36.5 g/dL    RDW 12.9 11.5 - 14.5 %    PLATELET 215 516 - 003 K/uL    MPV 9.7 8.9 - 12.9 FL    NRBC 0.0 0  WBC    ABSOLUTE NRBC 0.00 0.00 - 0.01 K/uL    NEUTROPHILS 63 32 - 75 %    LYMPHOCYTES 21 12 - 49 %    MONOCYTES 12 5 - 13 %    EOSINOPHILS 2 0 - 7 %    BASOPHILS 1 0 - 1 %    IMMATURE GRANULOCYTES 1 (H) 0.0 - 0.5 %    ABS. NEUTROPHILS 3.9 1.8 - 8.0 K/UL    ABS. LYMPHOCYTES 1.3 0.8 - 3.5 K/UL    ABS. MONOCYTES 0.7 0.0 - 1.0 K/UL    ABS. EOSINOPHILS 0.1 0.0 - 0.4 K/UL    ABS. BASOPHILS 0.0 0.0 - 0.1 K/UL    ABS. IMM.  GRANS. 0.0 0.00 - 0.04 K/UL    DF AUTOMATED     METABOLIC PANEL, COMPREHENSIVE    Collection Time: 10/11/20  2:55 PM   Result Value Ref Range    Sodium 136 136 - 145 mmol/L    Potassium 4.1 3.5 - 5.1 mmol/L    Chloride 103 97 - 108 mmol/L    CO2 25 21 - 32 mmol/L    Anion gap 8 5 - 15 mmol/L    Glucose 267 (H) 65 - 100 mg/dL    BUN 18 6 - 20 MG/DL    Creatinine 1.33 (H) 0.70 - 1.30 MG/DL    BUN/Creatinine ratio 14 12 - 20      GFR est AA >60 >60 ml/min/1.73m2    GFR est non-AA 54 (L) >60 ml/min/1.73m2    Calcium 8.5 8.5 - 10.1 MG/DL    Bilirubin, total 0.4 0.2 - 1.0 MG/DL    ALT (SGPT) 11 (L) 12 - 78 U/L    AST (SGOT) 11 (L) 15 - 37 U/L    Alk. phosphatase 98 45 - 117 U/L    Protein, total 6.3 (L) 6.4 - 8.2 g/dL    Albumin 2.6 (L) 3.5 - 5.0 g/dL    Globulin 3.7 2.0 - 4.0 g/dL    A-G Ratio 0.7 (L) 1.1 - 2.2     CULTURE, BLOOD, PAIRED    Collection Time: 10/11/20  2:55 PM    Specimen: Blood   Result Value Ref Range    Special Requests: NO SPECIAL REQUESTS      Culture result: NO GROWTH AFTER 13 HOURS     SAMPLES BEING HELD    Collection Time: 10/11/20  2:55 PM   Result Value Ref Range    SAMPLES BEING HELD 1SST,1RD,1BL     COMMENT        Add-on orders for these samples will be processed based on acceptable specimen integrity and analyte stability, which may vary by analyte.    CULTURE, WOUND [place] STAIN    Collection Time: 10/11/20  3:23 PM    Specimen: Hand   Result Value Ref Range    Special Requests: NO SPECIAL REQUESTS      GRAM STAIN OCCASIONAL WBCS SEEN      GRAM STAIN NO ORGANISMS SEEN      Culture result: PENDING    GLUCOSE, POC    Collection Time: 10/11/20  6:15 PM   Result Value Ref Range    Glucose (POC) 210 (H) 65 - 100 mg/dL    Performed by Autumn DILL (CON)    GLUCOSE, POC    Collection Time: 10/11/20  9:12 PM   Result Value Ref Range    Glucose (POC) 131 (H) 65 - 100 mg/dL    Performed by Andra Gibson (CON)    METABOLIC PANEL, COMPREHENSIVE    Collection Time: 10/12/20 12:14 AM   Result Value Ref Range    Sodium 134 (L) 136 - 145 mmol/L    Potassium 4.1 3.5 - 5.1 mmol/L    Chloride 103 97 - 108 mmol/L    CO2 26 21 - 32 mmol/L    Anion gap 5 5 - 15 mmol/L    Glucose 128 (H) 65 - 100 mg/dL    BUN 20 6 - 20 MG/DL    Creatinine 1.13 0.70 - 1.30 MG/DL    BUN/Creatinine ratio 18 12 - 20      GFR est AA >60 >60 ml/min/1.73m2    GFR est non-AA >60 >60 ml/min/1.73m2    Calcium 8.2 (L) 8.5 - 10.1 MG/DL    Bilirubin, total 0.4 0.2 - 1.0 MG/DL    ALT (SGPT) 11 (L) 12 - 78 U/L    AST (SGOT) 15 15 - 37 U/L    Alk. phosphatase 99 45 - 117 U/L    Protein, total 6.2 (L) 6.4 - 8.2 g/dL    Albumin 2.5 (L) 3.5 - 5.0 g/dL    Globulin 3.7 2.0 - 4.0 g/dL    A-G Ratio 0.7 (L) 1.1 - 2.2     HEMOGLOBIN A1C WITH EAG    Collection Time: 10/12/20 12:14 AM   Result Value Ref Range    Hemoglobin A1c 9.3 (H) 4.0 - 5.6 %    Est. average glucose 220 mg/dL   CBC WITH AUTOMATED DIFF    Collection Time: 10/12/20 12:14 AM   Result Value Ref Range    WBC 7.6 4.1 - 11.1 K/uL    RBC 4.34 4.10 - 5.70 M/uL    HGB 13.1 12.1 - 17.0 g/dL    HCT 38.8 36.6 - 50.3 %    MCV 89.4 80.0 - 99.0 FL    MCH 30.2 26.0 - 34.0 PG    MCHC 33.8 30.0 - 36.5 g/dL    RDW 12.4 11.5 - 14.5 %    PLATELET 139 273 - 979 K/uL    MPV 9.3 8.9 - 12.9 FL    NRBC 0.0 0  WBC    ABSOLUTE NRBC 0.00 0.00 - 0.01 K/uL    NEUTROPHILS 76 (H) 32 - 75 %    LYMPHOCYTES 12 12 - 49 %    MONOCYTES 9 5 - 13 %    EOSINOPHILS 1 0 - 7 %    BASOPHILS 1 0 - 1 %    IMMATURE GRANULOCYTES 1 (H) 0.0 - 0.5 %    ABS. NEUTROPHILS 5.9 1.8 - 8.0 K/UL    ABS. LYMPHOCYTES 1.0 0.8 - 3.5 K/UL    ABS. MONOCYTES 0.7 0.0 - 1.0 K/UL    ABS. EOSINOPHILS 0.0 0.0 - 0.4 K/UL    ABS. BASOPHILS 0.0 0.0 - 0.1 K/UL    ABS. IMM.  GRANS. 0.1 (H) 0.00 - 0.04 K/UL    DF AUTOMATED     MAGNESIUM    Collection Time: 10/12/20 12:14 AM   Result Value Ref Range    Magnesium 2.1 1.6 - 2.4 mg/dL   PHOSPHORUS    Collection Time: 10/12/20 12:14 AM   Result Value Ref Range    Phosphorus 3.2 2.6 - 4.7 MG/DL   GLUCOSE, POC    Collection Time: 10/12/20  6:36 AM   Result Value Ref Range    Glucose (POC) 117 (H) 65 - 100 mg/dL    Performed by Kerry Rowland (PCT)    GLUCOSE, POC    Collection Time: 10/12/20 11:56 AM   Result Value Ref Range    Glucose (POC) 114 (H) 65 - 100 mg/dL    Performed by Benito Franklin (PCT)          Assessment/Plan:   A: 1. Right hand cellulitis  2. Right hand superficial abscess with possible collar button abscess  3. Possible pyogenic flexor tenosynovitis small finger      P: 1. Strict elevation of right hand  2. Will splint in position of comfort. 3. Remove IV from right forearm. 4. Continue IV antibiotics for now. 5. Add ESR and CRP for trending  6. Needs STAT MRI of right hand with contract to evaluate for collar button abscess and pyogenic flexor tenosynovitis  7. Keep NPO   8. Orthopedics to follow. Discussed case with Dr. Eliot Portillo who agrees with plan.     Matheus Sierra, [address] [facility]   [facility]  [address] [facility]

## 2020-10-12 NOTE — PROGRESS NOTES
Problem: Falls - Risk of  Goal: *Absence of Falls  Description: Document Radha Dickerson Fall Risk and appropriate interventions in the flowsheet.   Outcome: Progressing Towards Goal  Note: Fall Risk Interventions:            Medication Interventions: Evaluate medications/consider consulting pharmacy                   Problem: Patient Education: Go to Patient Education Activity  Goal: Patient/Family Education  Outcome: Progressing Towards Goal

## 2020-10-12 NOTE — CONSULTS
Lucien Hernandez MD, [address] [place] [address]  Marilou Friedman 34  [phone]    Date of  Admission: 10/11/2020  2:17 PM         IMPRESSION and RECOMMENDATIONS     1. Non-ischemic CM:  Stable and euvolumic on current regimen. Cont coreg, entresto, etc.  Regarding MRI, his [employer] S-ICD is MRI safe. Regarding poss I&D of hand, Mr. Meryl Peña is a Low Risk patient from a cardiac perspective who will be undergoing a Low Risk procedure. I recommend proceeding ahead with the procedure without further cardiac testing at this time. 2.  Dyslipidemia:  Cont crestor. I have discussed this plan with the patient. He appears to understand this plan and wishes to proceed ahead. Problem List  Date Reviewed: 10/11/2020          Codes Class Noted    Psychiatric disorder ICD-10-CM: F99  ICD-9-CM: 298.9  Unknown    Overview Signed 10/11/2020  3:44 PM by Librety Brennan MD     depression and anxiety             Unspecified sleep apnea ICD-10-CM: G47.30  ICD-9-CM: 780.57  Unknown    Overview Signed 10/11/2020  3:44 PM by Liberty Brennan MD     lost weight             CKD (chronic kidney disease) stage 3, GFR 30-59 ml/min ICD-10-CM: N18.30  ICD-9-CM: 281. 3  Unknown        HFrEF (heart failure with reduced ejection fraction) ([facility] ICD-10-CM: I50.20  ICD-9-CM: 428.20  10/11/2020        Cellulitis and abscess of hand ICD-10-CM: L03.119, L02.519  ICD-9-CM: 682.4  10/11/2020        Cellulitis of scalp ICD-10-CM: L03.811  ICD-9-CM: 682.8  7/2/2014        Type II diabetes mellitus ([facility] ICD-10-CM: E11.9  ICD-9-CM: 250.00  7/2/2014        HTN (hypertension) (Chronic) ICD-10-CM: I10  ICD-9-CM: 401.9  7/2/2014        RLS (restless legs syndrome) (Chronic) ICD-10-CM: G25.81  ICD-9-CM: 333.94  7/2/2014        Diabetic foot ulcer ([facility] (Chronic) ICD-10-CM: C01.032, F12.035  ICD-9-CM: 250.80, 707.15  7/2/2014              History of Present Illness:     Emiliana Fay Angelo Cruz is a 60 y.o. male with the above problem list who was admitted for Cellulitis and abscess of hand [L03.119, L02.519]. Pt presents with right hand spider bite resulting in cellulitis. He denies chest pain/discomfort, shortness of breath, dyspnea on exertion, orthopnea, paroxysmal noctural dyspnea, lower extremity edema, palpitations, syncope, or near-syncope.     Current Facility-Administered Medications   Medication Dose Route Frequency    bumetanide (BUMEX) tablet 1 mg  1 mg Oral DAILY    [START ON 10/13/2020] Vancomycin trough 10/13 prior to 0500 dose   Other ONCE    cefepime (MAXIPIME) 2 g in sterile water (preservative free) 10 mL IV syringe  2 g IntraVENous Q8H    metroNIDAZOLE (FLAGYL) IVPB premix 500 mg  500 mg IntraVENous Q12H    vancomycin (VANCOCIN) 1500 mg in  ml infusion  1,500 mg IntraVENous Q12H    sodium chloride (NS) flush 5-40 mL  5-40 mL IntraVENous Q8H    sodium chloride (NS) flush 5-40 mL  5-40 mL IntraVENous PRN    acetaminophen (TYLENOL) tablet 650 mg  650 mg Oral Q6H PRN    Or    acetaminophen (TYLENOL) suppository 650 mg  650 mg Rectal Q6H PRN    polyethylene glycol (MIRALAX) packet 17 g  17 g Oral DAILY PRN    insulin lispro (HUMALOG) injection   SubCUTAneous QID WITH MEALS    glucose chewable tablet 16 g  4 Tab Oral PRN    dextrose (D50W) injection syrg 12.5-25 g  12.5-25 g IntraVENous PRN    glucagon (GLUCAGEN) injection 1 mg  1 mg IntraMUSCular PRN    sacubitriL-valsartan (ENTRESTO) 49-51 mg tablet 1 Tab  1 Tab Oral Q12H    insulin glargine (LANTUS) injection 25 Units  25 Units SubCUTAneous DAILY    enoxaparin (LOVENOX) injection 40 mg  40 mg SubCUTAneous Q24H    rOPINIRole (REQUIP) tablet 4 mg  4 mg Oral QHS    rosuvastatin (CRESTOR) tablet 10 mg  10 mg Oral DAILY    DULoxetine (CYMBALTA) capsule 120 mg  120 mg Oral DAILY    carvediloL (COREG) tablet 25 mg  25 mg Oral BID WITH MEALS      Allergies   Allergen Reactions    Penicillins Hives      No family history on file. Social History     Socioeconomic History    Marital status:      Spouse name: Not on file    Number of children: Not on file    Years of education: Not on file    Highest education level: Not on file   Occupational History    Not on file   Social Needs    Financial resource strain: Not on file    Food insecurity     Worry: Not on file     Inability: Not on file    Transportation needs     Medical: Not on file     Non-medical: Not on file   Tobacco Use    Smoking status: Current Some Day Smoker     Packs/day: 0.25     Years: 20.00     Pack years: 5.00    Tobacco comment: cigars   Substance and Sexual Activity    Alcohol use: Yes     Alcohol/week: 3.3 standard drinks     Types: 4 Cans of beer per week    Drug use: No    Sexual activity: Not on file   Lifestyle    Physical activity     Days per week: Not on file     Minutes per session: Not on file    Stress: Not on file   Relationships    Social connections     Talks on phone: Not on file     Gets together: Not on file     Attends Restorationist service: Not on file     Active member of club or organization: Not on file     Attends meetings of clubs or organizations: Not on file     Relationship status: Not on file    Intimate partner violence     Fear of current or ex partner: Not on file     Emotionally abused: Not on file     Physically abused: Not on file     Forced sexual activity: Not on file   Other Topics Concern    Not on file   Social History Narrative    Not on file       Physical Exam:     Patient Vitals for the past 16 hrs:   BP Temp Pulse Resp SpO2   10/12/20 1154 128/69 97.7 °F (36.5 °C) (!) 54 18 98 %   10/12/20 0742 (!) 148/72 97.5 °F (36.4 °C) 60 18 98 %   10/12/20 0700   (!) 57     10/12/20 0321 121/65 97.5 °F (36.4 °C) 62 18 96 %   10/11/20 2254 (!) 155/64 98.6 °F (37 °C) 75 18 96 %       HEENT Exam:     Normocephalic, atraumatic. EOMI. Oropharynx negative. Neck supple. No lymphadenopathy. Lung Exam:     The patient is not dyspneic. There is no cough. Breath sounds are heard equally in all lung fields. There are no wheezes, rales, rhonchi, or rubs heard on auscultation. Heart Exam:     The rhythm is regular. The PMI is in the 5th intercostal space of the MCL. Apical impulse is normal. S1 is regular. S2 is physiologic. There is no S3, S4 gallop, murmur, click, or rub. Abdomen Exam:     Bowel sounds are normoactive. Abdomen soft in all quadrants. No tenderness. No palpable masses. No organomegaly. No hernias noted. No bruits or pulsatile mass. Extremities Exam:      There is no clubbing, cyanosis, edema, ulcers, varicose veins noted in the extremities. The neurovascular status is grossly intact with normal distal sensation and pulses. Erythema and swelling of right hand. Vascular Exam:     The radial, brachial, dorsalis pedis, posterior tibial, are equal and strong bilaterally The carotids are equal bilaterally without bruits. Labs:     Lab Results   Component Value Date/Time    Glucose 128 (H) 10/12/2020 12:14 AM    Sodium 134 (L) 10/12/2020 12:14 AM    Potassium 4.1 10/12/2020 12:14 AM    Chloride 103 10/12/2020 12:14 AM    CO2 26 10/12/2020 12:14 AM    BUN 20 10/12/2020 12:14 AM    Creatinine 1.13 10/12/2020 12:14 AM    Calcium 8.2 (L) 10/12/2020 12:14 AM     Recent Labs     10/12/20  0014 10/11/20  1455   WBC 7.6 6.1   HGB 13.1 13.5   HCT 38.8 39.3    256     Recent Labs     10/12/20  0014 10/11/20  1455   ALT 11* 11*   AP 99 98   TBILI 0.4 0.4   TP 6.2* 6.3*   ALB 2.5* 2.6*   GLOB 3.7 3.7     No results for input(s): INR, PTP, APTT, INREXT in the last 72 hours. No results for input(s): CPK, CKMB, TNIPOC in the last 72 hours. No lab exists for component: TROPONINI, ITNL  No results for input(s): TROIQ in the last 72 hours.   No results found for: CHOL, CHOLX, CHLST, CHOLV, HDL, HDLP, LDL, LDLC, DLDLP, TGLX, TRIGL, TRIGP, CHHD, CHHDX    EKG: None.  NSR on tele.

## 2020-10-12 NOTE — VIRTUAL HEALTH
Dr. Anoop Mccrary last office note:    Priti Olson 1955   Office/Outpatient Visit  Visit Date: Mon, May 18, 2020 1:00 pm  Provider: Hollis Agarwal MD (Assistant: Ethel Baker RN )  Location: [facility] AT [facility] ([facility])[address] [place]. [address] [phone]    Electronically signed by Eugenie Arndt MD on  05/18/2020 01:21:48 PM                           Subjective:    CC: Mr. Belle Humphreys is a 62year old White male. Patient states his PCP has been changed to Rasta Salamanca MD.  This is a 6  month follow-up visit. Patient did not bring medication list or bottles for review. He has a history of cardiomyopathy ( unspecified ), coronary artery disease of the native coronary artery,  essential hypertension, and ICD in situ. last Entresto dose that was refilled was 24/25 not 49/51 - has been taking the lower dose since March - per last office note med was increased in March 2019 to 49/51    HPI:           Regarding cardiomyopathy:  MD Notes: doing well, back on higher dose of entresto   He is here today for routine follow-up. The course of the disease has been stable. Currently, his treatment regimen consists of Coreg and a diuretic ( bumetanide ). A transthoracic ECHO has been done ( performed 10/19/2015 ). Coronary Artery Disease:   He is here today for routine follow-up. The course of the disease has been stable. Currently, his treatment regimen consists of daily 81 mg aspirin,  Coreg,  Crestor, and Entresto. Current level of activity includes ability to walk. Mr. Belle Humphreys is compliant with tobacco avoidance and taking medications as recommended and prescribed. A cardiac cath has been done ( performed 07/2015 ). Regarding hypertension:  Type Primary Hypertension Current nonpharmacologic treatment includes smoking cessation. Currently, his treatment regimen consists of Coreg, a diuretic ( bumetanide ), and Entresto. Bp in office 120/73. Pt states he is taking 24/25 of Entresto and not the higher dose that was prescribed at last visit. he should be taking 49/51 mg of Entresto. Losing weight - trying. Down 14 lbs since Nov.       Presence of automatic (implantable) cardiac defibrillator noted. No issues with. Stated in Oct was on the verge of passing out - pt was advised at his last visit to call and ask and he did not find out anything. He will be following Dr Shefali Villa about this. ROS:   Discussed relevant lab and imaging studies along with the plan of treatment with the nurse. EYES:  Negative for blurred vision and eye pain. E/N/T:  Negative for epistaxis and hoarseness. CARDIOVASCULAR:  Please see HPI. RESPIRATORY:  Negative for cough and hemoptysis. GASTROINTESTINAL:  Negative for abdominal pain and dysphagia. MUSCULOSKELETAL:  Negative for arthralgias and back pain. NEUROLOGICAL:  Negative for headaches, paresthesias, and weakness. HEMATOLOGIC/LYMPHATIC:  Negative for easy bruising, bleeding, and lymphadenopathy. PSYCHIATRIC:  No symptoms reported. Past Medical History / Family History / Social History:     Last Reviewed on 2020 01:01 PM by Cassandra Hugo  Past Medical History:     Hypertension  Cardiomyopathy severe   Pneumonia: dx'd in ; hospitalized;   Sleep Apnea: (+) sleep study; Type 2 Diabetes   Mediastinal lymphadenopathy   MRSA   INFLUENZA VACCINE: was last done    PNEUMOCOCCAL VACCINE: was last done      Past Cardiac Procedures/Tests:  Echocardiogram on 10/19/15 - Severely decreased LV systolic function. with an estimated ejection fraction of 15-20%. .      Surgical History:   Surgical/Procedural History:     Cardiac Surgery/Procedures:  Cardiac Catheterization:  07/17/15 - insignificant CAD  ICD     Family History:   Father:   ; Positive for COPD;   Mother:   ; Positive for Coronary Artery Disease;   ; Positive for Cancer (unspecified type);      Social History:   Social History: Occupation: a    Marital Status:    Children: None     Tobacco/Alcohol/Supplements:   Last Reviewed on 5/18/2020 01:01 PM by Drew Sagastume  TOBACCO/ALCOHOL/SUPPLEMENTS   Tobacco: Current Smoker: He currently smokes every day, 1 cigar per week. Smoking cessation intervention counseling was conducted. Alcohol: Drinks alcohol very infrequently. Caffeine:  He admits to consuming caffeine via tea ( occasionally ) and soda ( 10 servings per day ). Substance Abuse History:   Last Reviewed on 5/18/2020 01:01 PM by Drew Sagastume  Substance Use/Abuse: Unremarkable     Mental Health History:   Last Reviewed on 5/18/2020 01:01 PM by Drew Sagastume    Communicable Diseases (eg STDs): Last Reviewed on 5/18/2020 01:01 PM by Drew Sagastume    Allergies:   Last Reviewed on 5/18/2020 01:01 PM by Drew Sagastume  Penicillins: hives     Current Medications:   Last Reviewed on 5/18/2020 01:01 PM by Drew Sagastume  metFORMIN 500 mg oral tablet [take 1 tablet (500 mg) by oral route 2 times per day with morning andevening meals]  Jardiance   Ozempic   rosuvastatin  [unsure of dosage]  Cymbalta 60 mg oral capsule,delayed release (enteric coated) [1 po qd]  Requip 1mg Tablet [1.5 tab po qd]  aspirin 81 mg oral tablet, delayed release (enteric coated) [1 po qod]  Carvedilol 25 mg oral tablet [1 po bid]  Bumex 1 mg oral tablet [1 po qd]  Entresto 49-51 mg oral tablet [TAKE 1 TABLET BY MOUTH TWICE DAILY]    Objective:    Vitals:     Historical:   11/15/2019  BP:   119/75 mm Hg ( (left arm, , sitting, );) 11/15/2019  Wt:   248.5lbs  Current: 5/18/2020 1:00:25 PM  Ht:  6 ft, 0 in; Wt: 234 lbs;  BMI: 31. 7BP: 120/73 mm Hg (left arm, sitting);  P: 86 bpm (left arm (BP Cuff), sitting);  sCr: 1.06 mg/dL;  GFR: 78.98    Exams:   GENERAL:  Alert, oriented to person, place and time x3. EYES:  Normal lids without xanthelasma; conjunctiva unremarkable; no scleral icterus.     HEENT:  Face symmetric, voice clear, extraocular muscles intact. NECK:  Supple. No bruits, No JVD. LUNGS:  Clear to auscultation. No rales, no wheezing. CARDIAC:  Regular rate and rhythm. PMI not displaced. ABDOMEN:  Soft. Positive bowel sounds. Non-tender. MUSCULOSKELETAL:  Normal range of motion, strength and tone. EXTREMITIES:  No edema. Good muscle tone and strength. SKIN: No significant rashes or lesions; no suspicious moles. NEUROLOGICAL:  No focal deficits, cranial nerves II-XII are grossly intact. PSYCHIATRIC:  Appropriate affect and demeanor; normal speech pattern; grossly normal memory. Lab/Test Results:     Sodium, Serum: 145 (02/05/2020),   Potassium, Serum: 4.8 (02/05/2020),   Glucose Serum: 214 (02/05/2020),   BUN serum/plasma (sCnc): 22 (02/05/2020),   Creatinine, Serum: 1.06 (02/05/2020),   HgbA1c: 12.0 (02/05/2020),   Total Cholesterol: 137 (02/05/2020),   Triglycerides: 222 (02/05/2020),   HDL Target >55: 34 (02/05/2020),   LDL Target <110: 59 (02/05/2020),   AST (SGOT): 19 (02/05/2020),   ALT (SGPT): 22 (02/05/2020),       Procedures:   Atherosclerotic heart disease of native coronary artery without angina pectoris    ECG INTERPRETATION: See scanned EKG for results.         Assessment:     I42.5   Other restrictive cardiomyopathy     I25.10   Atherosclerotic heart disease of native coronary artery without angina pectoris     I10   Essential (primary) hypertension     Z95.810   Presence of automatic (implantable) cardiac defibrillator     I10   Essential (primary) hypertension       ORDERS:     Procedures Ordered:     46511  Education and train for pt self-mgmt by qualified, nonphysician, ea 30 minutes; individual pt  (Send-Out)          16283  Electrocardiogram, routine with at least 12 leads; with interpretation and report  (In-House)          Other Orders:     4086F  Aspirin or clopidogrel prescribed (CAD)  (Send-Out)          [id number]  Queried Patient for Tobacco Use  (Send-Out)   ACE or ARB NOT Prescribed, reasons documented by clinician  (In-House)            LDL-C >= 100 mg/dL  (Send-Out)          0556F  Plan of care to achieve lipid control documented (CAD)  (In-House)          4013F  Statin therapy rxd/currently taken(CAD)  (In-House)              Plan:     Atherosclerotic heart disease of native coronary artery without angina pectoris  CAD: with ACE/ARB Rx with Diabetes, NOT Prescribed for documented reason Lipid Control LDL >= 100 mg/dL Plan of care to achieve lipid control documented Statin therapy prescribed or currently being taken with Antiplatelet Therapy Aspirin or Clopidogrel Prescribed     Medication list has been reviewed. Continue current medications. Change the following medication(s):  Entresto 24/25 mg take 2 tablets bid (call when getting ready to run out and will call in 49/51 mg). Smoking status: He smokes cigars. Smoking cessation discussed with patient. The counseling session lasted less than 10 minutes. Schedule a follow-up appointment in 12 months. GIVE pt Dr Yolis Mendoza phone number for follow-up   Discussed with patient diet, exercise plan and lifestyle modifications. The above note was transcribed by Momo Fuentes and authenticated by Dr. Mackenzie Holt prior to sign off.         Orders:     3678L  Aspirin or clopidogrel prescribed (CAD)  (Send-Out)          [id number]  Queried Patient for Tobacco Use  (Send-Out)          79236  Education and train for pt self-mgmt by qualified, nonphysician, herbie 30 minutes; individual pt  (Send-Out)          34169  Electrocardiogram, routine with at least 12 leads; with interpretation and report  (In-House)            ACE or ARB NOT Prescribed, reasons documented by clinician  (In-House)            LDL-C >= 100 mg/dL  (Send-Out)          0556F  Plan of care to achieve lipid control documented (CAD)  (In-House)          4013F  Statin therapy rxd/currently taken(CAD)  (In-House) Patient Education Handouts:     COV Congestive Heart Failure (CHF)      COV Coronary Artery Disease      COV Heart Healthy Diet      COV Hypertension        Patient Recommendations: For  Atherosclerotic heart disease of native coronary artery without angina pectoris:  Your medication list has been reviewed. Continue current medications. Change Entresto 24/25 mg take 2 tablets bid (call when getting ready to run out and will call in 49/51 mg). Schedule a follow-up visit in 12 months.   GIVE pt Dr Annia Rust phone number for follow-up

## 2020-10-12 NOTE — PROGRESS NOTES
Occupational Therapy Note:  Orders received and appreciated. Chart reviewed. Spoke with ortho PA and he requested to hold OT until after MRI completed and medical POC decided. Potential OT need required will be ADL management with edematous RUE and ROM per MD.  Will continue to follow and perform OT eval once clear for therapy. Thank you for the consult.   Shahriar Shepard, OTR/L, CBIS

## 2020-10-12 NOTE — PROGRESS NOTES
10/12/2020   CARE MANAGEMENT NOTE:    Reason for Admission:  Right hand cellulitis/abscess                     RUR Score:  12%                  Plan for utilizing home health:  TBD       PCP: First and Last name:  Dr. Faizan Arndt   Name of Practice:    Are you a current patient: Yes/No: Yes   Approximate date of last visit: Unknown; full assessment not complete at this writing   Can you participate in a virtual visit with your PCP: Unknown                    Current Advanced Directive/Advance Care Plan: Full Code; No Adv Care Plan on file                         Transition of Care Plan:   1. PT/OT evals pending; await recs  2. Medical management for worsening celllulitis failed outpt abx    CM will continue to follow pt for post discharge needs.   Stew

## 2020-10-12 NOTE — PROGRESS NOTES
MRI checklist, along with copy of patient's defibrillator card sent to MRI department. Patient states he previously saw Dr. Agatha Rosa (who has now left the practice) at [facility].

## 2020-10-12 NOTE — PROGRESS NOTES
Physical Therapy Note:    Orders acknowledged, chart reviewed, discussed with RN. PT evaluation deferred. RN reports pt independent with functional mobility and skilled PT not indicated. Orders completed.      Dorinda Lindsay, PT, DPT, Jane Giraldo

## 2020-10-12 NOTE — PROGRESS NOTES
Marcio Rubio [facility]  [address] [facility], [address] [place] [address]  [phone]      Medical Progress Note      NAME: Kassandra Man   :  1955  MRM:  [id number]    Date/Time of service: 10/12/2020  11:40 AM       Subjective:     Chief Complaint:  Patient was personally seen and examined by me during this time period. Chart reviewed. Still c/o right hand swelling, pain       Objective:       Vitals:       Last 24hrs VS reviewed since prior progress note.  Most recent are:    Visit Vitals  BP (!) 148/72 (BP 1 Location: Right arm, BP Patient Position: At rest)   Pulse 60   Temp 97.5 °F (36.4 °C)   Resp 18   Ht 6' (1.829 m)   Wt 107.1 kg (236 lb 1.8 oz)   SpO2 98%   BMI 32.02 kg/m²     SpO2 Readings from Last 6 Encounters:   10/12/20 98%   14 93%            Intake/Output Summary (Last 24 hours) at 10/12/2020 1140  Last data filed at 10/12/2020 0644  Gross per 24 hour   Intake    Output 600 ml   Net -600 ml        Exam:     Physical Exam:    Gen:  Well-developed, well-nourished, obese, in no acute distress  HEENT:  Pink conjunctivae, PERRL, hearing intact to voice, moist mucous membranes  Neck:  Supple, without masses, thyroid non-tender  Resp:  No accessory muscle use, clear breath sounds without wheezes rales or rhonchi  Card:  No murmurs, normal S1, S2 without thrills, bruits or peripheral edema  Abd:  Soft, non-tender, non-distended, normoactive bowel sounds are present  Musc:  No cyanosis or clubbing  Skin:  R hand erythema, swelling, open wound w/ some purulent drainage  Neuro:  Cranial nerves 3-12 are grossly intact, follows commands appropriately  Psych:  Good insight, oriented to person, place and time, alert    Medications Reviewed: (see below)    Lab Data Reviewed: (see below)    ______________________________________________________________________    Medications:     Current Facility-Administered Medications   Medication Dose Route Frequency    [START ON 10/13/2020] bumetanide (BUMEX) tablet 1 mg  1 mg Oral DAILY    cefepime (MAXIPIME) 2 g in sterile water (preservative free) 10 mL IV syringe  2 g IntraVENous Q8H    metroNIDAZOLE (FLAGYL) IVPB premix 500 mg  500 mg IntraVENous Q12H    vancomycin (VANCOCIN) 1500 mg in  ml infusion  1,500 mg IntraVENous Q12H    sodium chloride (NS) flush 5-40 mL  5-40 mL IntraVENous Q8H    sodium chloride (NS) flush 5-40 mL  5-40 mL IntraVENous PRN    acetaminophen (TYLENOL) tablet 650 mg  650 mg Oral Q6H PRN    Or    acetaminophen (TYLENOL) suppository 650 mg  650 mg Rectal Q6H PRN    polyethylene glycol (MIRALAX) packet 17 g  17 g Oral DAILY PRN    insulin lispro (HUMALOG) injection   SubCUTAneous QID WITH MEALS    glucose chewable tablet 16 g  4 Tab Oral PRN    dextrose (D50W) injection syrg 12.5-25 g  12.5-25 g IntraVENous PRN    glucagon (GLUCAGEN) injection 1 mg  1 mg IntraMUSCular PRN    sacubitriL-valsartan (ENTRESTO) 49-51 mg tablet 1 Tab  1 Tab Oral Q12H    insulin glargine (LANTUS) injection 25 Units  25 Units SubCUTAneous DAILY    enoxaparin (LOVENOX) injection 40 mg  40 mg SubCUTAneous Q24H    rOPINIRole (REQUIP) tablet 4 mg  4 mg Oral QHS    rosuvastatin (CRESTOR) tablet 10 mg  10 mg Oral DAILY    DULoxetine (CYMBALTA) capsule 120 mg  120 mg Oral DAILY    carvediloL (COREG) tablet 25 mg  25 mg Oral BID WITH MEALS          Lab Review:     Recent Labs     10/12/20  0014 10/11/20  1455   WBC 7.6 6.1   HGB 13.1 13.5   HCT 38.8 39.3    256     Recent Labs     10/12/20  0014 10/11/20  1455   * 136   K 4.1 4.1    103   CO2 26 25   * 267*   BUN 20 18   CREA 1.13 1.33*   CA 8.2* 8.5   MG 2.1  --    PHOS 3.2  --    ALB 2.5* 2.6*   TBILI 0.4 0.4   ALT 11* 11*     Lab Results   Component Value Date/Time    Glucose (POC) 117 (H) 10/12/2020 06:36 AM    Glucose (POC) 131 (H) 10/11/2020 09:12 PM    Glucose (POC) 210 (H) 10/11/2020 06:15 PM    Glucose (POC) 264 (H) 07/14/2014 11:46 AM    Glucose (POC) 120 (H) 07/14/2014 07:37 AM          Assessment / Plan:     61 yo hx of HTN, DM, sCHF EF 15-20%, CKD 3, presented w/ R hand cellulitis    1) R hand cellulitis/abscess: due to insect bite or nail puncture. Not septic. Will monitor cultures. Awaiting hand MRI. Might need I&D. Empirically on IV Vanc, cefepime, Flagyl. Ortho to see    2) Chronic systolic CHF: EF 24-22% s/p ICD. Follows by Dr. Stef Esposito. Volume stable. Will repeat echo. Cont entresto, coreg, bumex, statin. Consult Cards for cardiac clearance in case of surgery    3) HTN: cont coreg, entresto    4) DM type 2 w/ renal complications: Y9T 5.2%. Cont Lantus, SSI    5) CKD 3: Cr at baseline.   Will monitor     6) Depression/anxiety: cont cymbalta    Total time spent with patient: 35 min **I personally saw and examined the patient during this time period**                 Care Plan discussed with: Patient, nursing    Discussed:  Care Plan    Prophylaxis:  Lovenox    Disposition:  Home w/Family           ___________________________________________________    Attending Physician: Micky Chirinos MD

## 2020-10-12 NOTE — PROGRESS NOTES
[address] Dosing Services: Antimicrobial Stewardship Progress Note 10/12/2020    Consult for antibiotic dosing of vancomycin by Dr. Wojciech Levy  Pharmacist reviewed antibiotic appropriateness for 60year old , male  for indication of spider bit cellulitis  Day of Therapy 2  Tx failure OP doxycycline plus bactrim    Plan:  Vancomycin therapy:  Start Vancomycin therapy, with loading dose of 2gm then 1.5gm q12h. Dose calculated to approximate a therapeutic trough of  10-15mcg/mL. Plan for level: Vancomycin initial Tx for spider bite versus puncture wound cellulitis. Cx NGSF, added MRSA screen to help de-escalate if able. Will order a steady state trough level on current dose ~1500 mg Q12 hrs. Pharmacy to follow daily and will make changes to dose and/or frequency based on clinical status. Date Dose & Interval Measured (mcg/mL) Extrapolated (mcg/mL)   ?10/11/20 ? 2gm x1 ? ?   ?10/12/20 ?1.5gm q12h ? ?   ? ? ? ? Other Antimicrobial  (not dosed by pharmacist)   Cefepime 2gm ivp q8h, metronidazole 500mg ivpb q12h   Cultures     10/11 Wound: NGTD, Prelim  10/11 Blood (paired): NGTD, Prelim   Serum Creatinine     Lab Results   Component Value Date/Time    Creatinine 1.13 10/12/2020 12:14 AM    Creatinine (POC) 1.1 07/01/2014 05:51 PM       Creatinine Clearance Estimated Creatinine Clearance: 83.5 mL/min (based on SCr of 1.13 mg/dL).      Procalcitonin  No results found for: PCT     Temp   97.5 °F (36.4 °C)    WBC   Lab Results   Component Value Date/Time    WBC 7.6 10/12/2020 12:14 AM       H/H   Lab Results   Component Value Date/Time    HGB 13.1 10/12/2020 12:14 AM      Platelets Lab Results   Component Value Date/Time    PLATELET 931 53/67/8942 12:14 AM          Thanks,  Pharmacist: Signed LETY FreedD Contact information:

## 2020-10-12 NOTE — PROGRESS NOTES
Bedside, Verbal and Written shift change report given to Yovany Arzate RN (oncoming nurse) by Suzy Sanabria (offgoing nurse). Report included the following information SBAR, Kardex, ED Summary, Procedure Summary, Intake/Output, MAR, Recent Results and Med Rec Status.

## 2020-10-12 NOTE — PROGRESS NOTES
Patient scheduled for right hand I and D tomorrow at 07:30 with Dr. Lexus Azar. Continue with IV antibiotics overnight. Patient can have diabetic diet. Keep blood glucose under 200 for infectious reasons. Elevation. Will hold off on splinting hand today and will dress wound. NPO after midnight.        GABY KeithC  Orthopaedic Surgery PA  [address] [facility]

## 2020-10-13 ENCOUNTER — APPOINTMENT (OUTPATIENT)
Dept: NON INVASIVE DIAGNOSTICS | Age: 65
DRG: 580 | End: 2020-10-13
Attending: INTERNAL MEDICINE
Payer: COMMERCIAL

## 2020-10-13 ENCOUNTER — ANESTHESIA (OUTPATIENT)
Dept: SURGERY | Age: 65
DRG: 580 | End: 2020-10-13
Payer: COMMERCIAL

## 2020-10-13 ENCOUNTER — HOSPITAL ENCOUNTER (OUTPATIENT)
Dept: MRI IMAGING | Age: 65
Discharge: HOME | End: 2020-10-13
Attending: INTERNAL MEDICINE

## 2020-10-13 LAB
ANION GAP SERPL CALC-SCNC: 6 MMOL/L (ref 5–15)
BACTERIA SPEC CULT: ABNORMAL
BACTERIA SPEC CULT: NORMAL
BACTERIA SPEC CULT: NORMAL
BUN SERPL-MCNC: 20 MG/DL (ref 6–20)
BUN/CREAT SERPL: 19 (ref 12–20)
CALCIUM SERPL-MCNC: 8.2 MG/DL (ref 8.5–10.1)
CHLORIDE SERPL-SCNC: 107 MMOL/L (ref 97–108)
CO2 SERPL-SCNC: 24 MMOL/L (ref 21–32)
CREAT SERPL-MCNC: 1.04 MG/DL (ref 0.7–1.3)
DATE LAST DOSE: ABNORMAL
ECHO AO ROOT DIAM: 3.64 CM
ECHO AV AREA PEAK VELOCITY: 2.92 CM2
ECHO AV AREA VTI: 2.84 CM2
ECHO AV AREA/BSA PEAK VELOCITY: 1.3 CM2/M2
ECHO AV AREA/BSA VTI: 1.2 CM2/M2
ECHO AV MEAN GRADIENT: 4.08 MMHG
ECHO AV MEAN VELOCITY: 95.48 CM/S
ECHO AV PEAK GRADIENT: 7.1 MMHG
ECHO AV PEAK VELOCITY: 133.22 CM/S
ECHO AV VTI: 32.71 CM
ECHO LA MAJOR AXIS: 4.32 CM
ECHO LA MINOR AXIS: 1.89 CM
ECHO LA VOL 2C: 90.67 ML (ref 18–58)
ECHO LA VOL 4C: 78.76 ML (ref 18–58)
ECHO LA VOL BP: 92.85 ML (ref 18–58)
ECHO LA VOL/BSA BIPLANE: 40.62 ML/M2 (ref 16–28)
ECHO LA VOLUME INDEX A2C: 39.67 ML/M2 (ref 16–28)
ECHO LA VOLUME INDEX A4C: 34.46 ML/M2 (ref 16–28)
ECHO LV INTERNAL DIMENSION DIASTOLIC: 5.62 CM (ref 4.2–5.9)
ECHO LV INTERNAL DIMENSION SYSTOLIC: 4.14 CM
ECHO LV IVSD: 1.22 CM (ref 0.6–1)
ECHO LV MASS 2D: 285.3 G (ref 88–224)
ECHO LV MASS INDEX 2D: 124.8 G/M2 (ref 49–115)
ECHO LV POSTERIOR WALL DIASTOLIC: 1.2 CM (ref 0.6–1)
ECHO LVOT DIAM: 2.15 CM
ECHO LVOT PEAK GRADIENT: 4.64 MMHG
ECHO LVOT PEAK VELOCITY: 107.73 CM/S
ECHO LVOT SV: 92.8 ML
ECHO LVOT VTI: 25.7 CM
ECHO MV A VELOCITY: 93.23 CM/S
ECHO MV E DECELERATION TIME (DT): 0.26 S
ECHO MV E VELOCITY: 79 CM/S
ECHO MV E/A RATIO: 0.85
ECHO PV PEAK INSTANTANEOUS GRADIENT SYSTOLIC: 5.71 MMHG
ECHO PV REGURGITANT MAX VELOCITY: 111.61 CM/S
ECHO RV INTERNAL DIMENSION: 4 CM
ECHO TV REGURGITANT MAX VELOCITY: 155.47 CM/S
ECHO TV REGURGITANT PEAK GRADIENT: 9.67 MMHG
GLUCOSE BLD STRIP.AUTO-MCNC: 116 MG/DL (ref 65–100)
GLUCOSE BLD STRIP.AUTO-MCNC: 137 MG/DL (ref 65–100)
GLUCOSE BLD STRIP.AUTO-MCNC: 159 MG/DL (ref 65–100)
GLUCOSE BLD STRIP.AUTO-MCNC: 235 MG/DL (ref 65–100)
GLUCOSE BLD STRIP.AUTO-MCNC: 273 MG/DL (ref 65–100)
GLUCOSE SERPL-MCNC: 142 MG/DL (ref 65–100)
GRAM STN SPEC: ABNORMAL
GRAM STN SPEC: ABNORMAL
LVOT MG: 2.4 MMHG
MAGNESIUM SERPL-MCNC: 2.1 MG/DL (ref 1.6–2.4)
PHOSPHATE SERPL-MCNC: 2.4 MG/DL (ref 2.6–4.7)
POTASSIUM SERPL-SCNC: 3.9 MMOL/L (ref 3.5–5.1)
REPORTED DOSE,DOSE: ABNORMAL UNITS
REPORTED DOSE/TIME,TMG: ABNORMAL
SERVICE CMNT-IMP: ABNORMAL
SERVICE CMNT-IMP: NORMAL
SODIUM SERPL-SCNC: 137 MMOL/L (ref 136–145)
VANCOMYCIN TROUGH SERPL-MCNC: 19.2 UG/ML (ref 5–10)

## 2020-10-13 PROCEDURE — 87205 SMEAR GRAM STAIN: CPT

## 2020-10-13 PROCEDURE — 74011250636 HC RX REV CODE- 250/636: Performed by: NURSE ANESTHETIST, CERTIFIED REGISTERED

## 2020-10-13 PROCEDURE — 82962 GLUCOSE BLOOD TEST: CPT

## 2020-10-13 PROCEDURE — 74011000250 HC RX REV CODE- 250: Performed by: ORTHOPAEDIC SURGERY

## 2020-10-13 PROCEDURE — 74011250636 HC RX REV CODE- 250/636: Performed by: INTERNAL MEDICINE

## 2020-10-13 PROCEDURE — 74011250637 HC RX REV CODE- 250/637: Performed by: INTERNAL MEDICINE

## 2020-10-13 PROCEDURE — 80048 BASIC METABOLIC PNL TOTAL CA: CPT

## 2020-10-13 PROCEDURE — 76060000061 HC AMB SURG ANES 0.5 TO 1 HR: Performed by: ORTHOPAEDIC SURGERY

## 2020-10-13 PROCEDURE — 74011250636 HC RX REV CODE- 250/636: Performed by: ANESTHESIOLOGY

## 2020-10-13 PROCEDURE — 76210000035 HC AMBSU PH I REC 1 TO 1.5 HR: Performed by: ORTHOPAEDIC SURGERY

## 2020-10-13 PROCEDURE — 74011000250 HC RX REV CODE- 250: Performed by: INTERNAL MEDICINE

## 2020-10-13 PROCEDURE — 77030040922 HC BLNKT HYPOTHRM STRY -A

## 2020-10-13 PROCEDURE — 80202 ASSAY OF VANCOMYCIN: CPT

## 2020-10-13 PROCEDURE — 84100 ASSAY OF PHOSPHORUS: CPT

## 2020-10-13 PROCEDURE — 77030020143 HC AIRWY LARYN INTUB CGAS -A: Performed by: STUDENT IN AN ORGANIZED HEALTH CARE EDUCATION/TRAINING PROGRAM

## 2020-10-13 PROCEDURE — 77030002916 HC SUT ETHLN J&J -A: Performed by: ORTHOPAEDIC SURGERY

## 2020-10-13 PROCEDURE — 65660000000 HC RM CCU STEPDOWN

## 2020-10-13 PROCEDURE — 36415 COLL VENOUS BLD VENIPUNCTURE: CPT

## 2020-10-13 PROCEDURE — 93306 TTE W/DOPPLER COMPLETE: CPT

## 2020-10-13 PROCEDURE — 2709999900 HC NON-CHARGEABLE SUPPLY: Performed by: ORTHOPAEDIC SURGERY

## 2020-10-13 PROCEDURE — 83735 ASSAY OF MAGNESIUM: CPT

## 2020-10-13 PROCEDURE — 77030019895 HC PCKNG STRP IODO -A: Performed by: ORTHOPAEDIC SURGERY

## 2020-10-13 PROCEDURE — 0J9J0ZZ DRAINAGE OF RIGHT HAND SUBCUTANEOUS TISSUE AND FASCIA, OPEN APPROACH: ICD-10-PCS | Performed by: ORTHOPAEDIC SURGERY

## 2020-10-13 PROCEDURE — 76030000000 HC AMB SURG OR TIME 0.5 TO 1: Performed by: ORTHOPAEDIC SURGERY

## 2020-10-13 PROCEDURE — 77030018836 HC SOL IRR NACL ICUM -A: Performed by: ORTHOPAEDIC SURGERY

## 2020-10-13 PROCEDURE — 74011000250 HC RX REV CODE- 250: Performed by: NURSE ANESTHETIST, CERTIFIED REGISTERED

## 2020-10-13 PROCEDURE — 0L970ZZ DRAINAGE OF RIGHT HAND TENDON, OPEN APPROACH: ICD-10-PCS | Performed by: ORTHOPAEDIC SURGERY

## 2020-10-13 PROCEDURE — 87075 CULTR BACTERIA EXCEPT BLOOD: CPT

## 2020-10-13 PROCEDURE — 74011636637 HC RX REV CODE- 636/637: Performed by: INTERNAL MEDICINE

## 2020-10-13 RX ORDER — AMOXICILLIN 250 MG
1 CAPSULE ORAL DAILY
Status: DISCONTINUED | OUTPATIENT
Start: 2020-10-13 | End: 2020-10-16 | Stop reason: HOSPADM

## 2020-10-13 RX ORDER — MIDAZOLAM HYDROCHLORIDE 1 MG/ML
INJECTION, SOLUTION INTRAMUSCULAR; INTRAVENOUS AS NEEDED
Status: DISCONTINUED | OUTPATIENT
Start: 2020-10-13 | End: 2020-10-13 | Stop reason: HOSPADM

## 2020-10-13 RX ORDER — LIDOCAINE HYDROCHLORIDE 20 MG/ML
INJECTION, SOLUTION EPIDURAL; INFILTRATION; INTRACAUDAL; PERINEURAL AS NEEDED
Status: DISCONTINUED | OUTPATIENT
Start: 2020-10-13 | End: 2020-10-13 | Stop reason: HOSPADM

## 2020-10-13 RX ORDER — LIDOCAINE HYDROCHLORIDE 10 MG/ML
0.1 INJECTION, SOLUTION EPIDURAL; INFILTRATION; INTRACAUDAL; PERINEURAL AS NEEDED
Status: DISCONTINUED | OUTPATIENT
Start: 2020-10-13 | End: 2020-10-13 | Stop reason: HOSPADM

## 2020-10-13 RX ORDER — OXYCODONE HYDROCHLORIDE 5 MG/1
5 TABLET ORAL
Status: DISCONTINUED | OUTPATIENT
Start: 2020-10-13 | End: 2020-10-16 | Stop reason: HOSPADM

## 2020-10-13 RX ORDER — SODIUM CHLORIDE, SODIUM LACTATE, POTASSIUM CHLORIDE, CALCIUM CHLORIDE 600; 310; 30; 20 MG/100ML; MG/100ML; MG/100ML; MG/100ML
100 INJECTION, SOLUTION INTRAVENOUS CONTINUOUS
Status: DISCONTINUED | OUTPATIENT
Start: 2020-10-13 | End: 2020-10-13 | Stop reason: HOSPADM

## 2020-10-13 RX ORDER — FENTANYL CITRATE 50 UG/ML
INJECTION, SOLUTION INTRAMUSCULAR; INTRAVENOUS AS NEEDED
Status: DISCONTINUED | OUTPATIENT
Start: 2020-10-13 | End: 2020-10-13 | Stop reason: HOSPADM

## 2020-10-13 RX ORDER — ONDANSETRON 2 MG/ML
INJECTION INTRAMUSCULAR; INTRAVENOUS AS NEEDED
Status: DISCONTINUED | OUTPATIENT
Start: 2020-10-13 | End: 2020-10-13 | Stop reason: HOSPADM

## 2020-10-13 RX ORDER — HYDROMORPHONE HYDROCHLORIDE 1 MG/ML
1 INJECTION, SOLUTION INTRAMUSCULAR; INTRAVENOUS; SUBCUTANEOUS
Status: DISCONTINUED | OUTPATIENT
Start: 2020-10-13 | End: 2020-10-16 | Stop reason: HOSPADM

## 2020-10-13 RX ORDER — ETOMIDATE 2 MG/ML
INJECTION INTRAVENOUS AS NEEDED
Status: DISCONTINUED | OUTPATIENT
Start: 2020-10-13 | End: 2020-10-13 | Stop reason: HOSPADM

## 2020-10-13 RX ORDER — PROPOFOL 10 MG/ML
INJECTION, EMULSION INTRAVENOUS AS NEEDED
Status: DISCONTINUED | OUTPATIENT
Start: 2020-10-13 | End: 2020-10-13 | Stop reason: HOSPADM

## 2020-10-13 RX ORDER — INSULIN GLARGINE 100 [IU]/ML
35 INJECTION, SOLUTION SUBCUTANEOUS DAILY
Status: DISCONTINUED | OUTPATIENT
Start: 2020-10-14 | End: 2020-10-16 | Stop reason: HOSPADM

## 2020-10-13 RX ORDER — EPHEDRINE SULFATE/0.9% NACL/PF 50 MG/5 ML
SYRINGE (ML) INTRAVENOUS AS NEEDED
Status: DISCONTINUED | OUTPATIENT
Start: 2020-10-13 | End: 2020-10-13 | Stop reason: HOSPADM

## 2020-10-13 RX ORDER — HYDRALAZINE HYDROCHLORIDE 20 MG/ML
20 INJECTION INTRAMUSCULAR; INTRAVENOUS ONCE
Status: COMPLETED | OUTPATIENT
Start: 2020-10-13 | End: 2020-10-13

## 2020-10-13 RX ORDER — HYDROMORPHONE HYDROCHLORIDE 1 MG/ML
.25-1 INJECTION, SOLUTION INTRAMUSCULAR; INTRAVENOUS; SUBCUTANEOUS
Status: DISCONTINUED | OUTPATIENT
Start: 2020-10-13 | End: 2020-10-13 | Stop reason: HOSPADM

## 2020-10-13 RX ADMIN — VANCOMYCIN HYDROCHLORIDE 1500 MG: 10 INJECTION, POWDER, LYOPHILIZED, FOR SOLUTION INTRAVENOUS at 05:22

## 2020-10-13 RX ADMIN — INSULIN LISPRO 5 UNITS: 100 INJECTION, SOLUTION INTRAVENOUS; SUBCUTANEOUS at 16:44

## 2020-10-13 RX ADMIN — FENTANYL CITRATE 50 MCG: 0.05 INJECTION, SOLUTION INTRAMUSCULAR; INTRAVENOUS at 08:04

## 2020-10-13 RX ADMIN — SODIUM CHLORIDE, SODIUM LACTATE, POTASSIUM CHLORIDE, AND CALCIUM CHLORIDE 100 ML/HR: 600; 310; 30; 20 INJECTION, SOLUTION INTRAVENOUS at 06:36

## 2020-10-13 RX ADMIN — Medication 10 MG: at 08:17

## 2020-10-13 RX ADMIN — Medication 10 ML: at 05:22

## 2020-10-13 RX ADMIN — SACUBITRIL AND VALSARTAN 1 TABLET: 49; 51 TABLET, FILM COATED ORAL at 21:27

## 2020-10-13 RX ADMIN — OXYCODONE 5 MG: 5 TABLET ORAL at 12:32

## 2020-10-13 RX ADMIN — ROPINIROLE HYDROCHLORIDE 4 MG: 1 TABLET, FILM COATED ORAL at 21:26

## 2020-10-13 RX ADMIN — INSULIN GLARGINE 25 UNITS: 100 INJECTION, SOLUTION SUBCUTANEOUS at 10:10

## 2020-10-13 RX ADMIN — MIDAZOLAM HYDROCHLORIDE 2 MG: 2 INJECTION, SOLUTION INTRAMUSCULAR; INTRAVENOUS at 07:37

## 2020-10-13 RX ADMIN — HYDRALAZINE HYDROCHLORIDE 20 MG: 20 INJECTION INTRAMUSCULAR; INTRAVENOUS at 17:08

## 2020-10-13 RX ADMIN — Medication 5 MG: at 08:02

## 2020-10-13 RX ADMIN — SACUBITRIL AND VALSARTAN 1 TABLET: 49; 51 TABLET, FILM COATED ORAL at 10:15

## 2020-10-13 RX ADMIN — OXYCODONE 5 MG: 5 TABLET ORAL at 21:26

## 2020-10-13 RX ADMIN — Medication 10 ML: at 21:27

## 2020-10-13 RX ADMIN — METRONIDAZOLE 500 MG: 500 INJECTION, SOLUTION INTRAVENOUS at 16:44

## 2020-10-13 RX ADMIN — CARVEDILOL 25 MG: 12.5 TABLET, FILM COATED ORAL at 10:10

## 2020-10-13 RX ADMIN — VANCOMYCIN HYDROCHLORIDE 1000 MG: 1 INJECTION, POWDER, LYOPHILIZED, FOR SOLUTION INTRAVENOUS at 21:27

## 2020-10-13 RX ADMIN — DULOXETINE HYDROCHLORIDE 120 MG: 30 CAPSULE, DELAYED RELEASE ORAL at 21:26

## 2020-10-13 RX ADMIN — PROPOFOL 100 MG: 10 INJECTION, EMULSION INTRAVENOUS at 07:50

## 2020-10-13 RX ADMIN — Medication 5 MG: at 07:58

## 2020-10-13 RX ADMIN — BUMETANIDE 1 MG: 1 TABLET ORAL at 10:10

## 2020-10-13 RX ADMIN — ROSUVASTATIN CALCIUM 10 MG: 10 TABLET, COATED ORAL at 21:26

## 2020-10-13 RX ADMIN — CEFEPIME HYDROCHLORIDE 2 G: 2 INJECTION, POWDER, FOR SOLUTION INTRAVENOUS at 15:22

## 2020-10-13 RX ADMIN — METRONIDAZOLE 500 MG: 500 INJECTION, SOLUTION INTRAVENOUS at 05:21

## 2020-10-13 RX ADMIN — ENOXAPARIN SODIUM 40 MG: 40 INJECTION SUBCUTANEOUS at 21:27

## 2020-10-13 RX ADMIN — CARVEDILOL 25 MG: 12.5 TABLET, FILM COATED ORAL at 16:44

## 2020-10-13 RX ADMIN — CEFEPIME HYDROCHLORIDE 2 G: 2 INJECTION, POWDER, FOR SOLUTION INTRAVENOUS at 23:45

## 2020-10-13 RX ADMIN — ONDANSETRON HYDROCHLORIDE 4 MG: 2 SOLUTION INTRAMUSCULAR; INTRAVENOUS at 08:22

## 2020-10-13 RX ADMIN — CEFEPIME HYDROCHLORIDE 2 G: 2 INJECTION, POWDER, FOR SOLUTION INTRAVENOUS at 00:26

## 2020-10-13 RX ADMIN — ETOMIDATE 10 MG: 2 INJECTION, SOLUTION INTRAVENOUS at 07:50

## 2020-10-13 RX ADMIN — CEFEPIME HYDROCHLORIDE 2 G: 2 INJECTION, POWDER, FOR SOLUTION INTRAVENOUS at 07:55

## 2020-10-13 RX ADMIN — FENTANYL CITRATE 50 MCG: 0.05 INJECTION, SOLUTION INTRAMUSCULAR; INTRAVENOUS at 07:48

## 2020-10-13 RX ADMIN — Medication 10 ML: at 15:23

## 2020-10-13 RX ADMIN — HYDROMORPHONE HYDROCHLORIDE 0.25 MG: 1 INJECTION, SOLUTION INTRAMUSCULAR; INTRAVENOUS; SUBCUTANEOUS at 09:29

## 2020-10-13 RX ADMIN — LIDOCAINE HYDROCHLORIDE 100 MG: 20 INJECTION, SOLUTION INTRAVENOUS at 07:50

## 2020-10-13 RX ADMIN — HYDROMORPHONE HYDROCHLORIDE 0.25 MG: 1 INJECTION, SOLUTION INTRAMUSCULAR; INTRAVENOUS; SUBCUTANEOUS at 09:15

## 2020-10-13 NOTE — PROGRESS NOTES
Occupational Therapy Note:  Chart reviewed. Pt currently DO for R hand I and D. Will follow up at later time for OT eval when pt cleared to participate.   Cal Shepard, OTR/L, MATIS

## 2020-10-13 NOTE — PROGRESS NOTES
10/13/2020   CARE MANAGEMENT NOTE:  CM reviewed EMR for clinical updates. Pt was admitted with right hand cellulitis/abscess and is s/p I&D Tues. Reportedly, pt resides with his wife Fan Obregon ([phone]) in a two story home with bedroom on the second floor. There are 13 stairs between floors and 5 side entry steps. PTA, pt was ambulatory, indepn with ADLs, and drives. He is employed full time and he obtains medications from [facility] with the exception of Cymbalta that he obtains from [employer] with [facility]. Pt has not had any home healthcare currently. DME - has a glucometer. PCP is Dr. Latoya Vargas whom he last saw 6 months ago. RUR 13%    Transition Plan of Care:  1. Plan is to return home without any anticipated post discharge needs at this writing. 2.  Outpt f/u  3. Pt will arrange his own transportation home. CM will continue to follow pt until discharged.   Stew

## 2020-10-13 NOTE — ROUTINE PROCESS
TRANSFER - OUT REPORT: 
 
Verbal report given to Eliz Munguia RN 
(name) on Darryl Calix  being transferred to 5th floor for routine post - op Report consisted of patients Situation, Background, Assessment and  
Recommendations(SBAR). Information from the following report(s) OR Summary, MAR, Recent Results, Med Rec Status and Cardiac Rhythm sinus toma was reviewed with the receiving nurse. Lines:  
Peripheral IV 10/12/20 Left Forearm (Active) Site Assessment Clean, dry, & intact 10/13/20 0920 Phlebitis Assessment 0 10/13/20 0920 Infiltration Assessment 0 10/13/20 0920 Dressing Status Clean, dry, & intact 10/13/20 0920 Dressing Type 4 X 4;Tape;Transparent 10/13/20 0920 Hub Color/Line Status Pink 10/13/20 0920 Action Taken Open ports on tubing capped 10/13/20 0300 Alcohol Cap Used Yes 10/13/20 0920 Opportunity for questions and clarification was provided. Patient transported with: 
 Registered Nurse

## 2020-10-13 NOTE — PROGRESS NOTES
Marcio Rubio [facility] [address]  [address] [place] [address]  [phone]      Medical Progress Note      NAME: Darryl Calix   :  1955  MRM:  [id number]    Date/Time of service: 10/13/2020  11:39 AM       Subjective:     Chief Complaint:  Patient was personally seen and examined by me during this time period. Chart reviewed. S/p I&D by ortho. No fevers       Objective:       Vitals:       Last 24hrs VS reviewed since prior progress note.  Most recent are:    Visit Vitals  /65   Pulse 60   Temp 97.6 °F (36.4 °C)   Resp 16   Ht 6' (1.829 m)   Wt 107.1 kg (236 lb 1.8 oz)   SpO2 93%   BMI 32.02 kg/m²     SpO2 Readings from Last 6 Encounters:   10/13/20 93%   14 93%    O2 Flow Rate (L/min): 3 l/min       Intake/Output Summary (Last 24 hours) at 10/13/2020 1139  Last data filed at 10/13/2020 0920  Gross per 24 hour   Intake 350 ml   Output 2625 ml   Net -2275 ml        Exam:     Physical Exam:    Gen:  Well-developed, well-nourished, obese, in no acute distress  HEENT:  Pink conjunctivae, PERRL, hearing intact to voice, moist mucous membranes  Neck:  Supple, without masses, thyroid non-tender  Resp:  No accessory muscle use, clear breath sounds without wheezes rales or rhonchi  Card:  No murmurs, normal S1, S2 without thrills, bruits or peripheral edema  Abd:  Soft, non-tender, non-distended, normoactive bowel sounds are present  Musc:  No cyanosis or clubbing  Skin:  R hand dressing c/d/i  Neuro:  Cranial nerves 3-12 are grossly intact, follows commands appropriately  Psych:  Good insight, oriented to person, place and time, alert    Medications Reviewed: (see below)    Lab Data Reviewed: (see below)    ______________________________________________________________________    Medications:     Current Facility-Administered Medications   Medication Dose Route Frequency    vancomycin (VANCOCIN) 1,000 mg in 0.9% sodium chloride (MBP/ADV) 250 mL  1,000 mg IntraVENous Q12H    bumetanide (BUMEX) tablet 1 mg  1 mg Oral DAILY    cefepime (MAXIPIME) 2 g in sterile water (preservative free) 10 mL IV syringe  2 g IntraVENous Q8H    metroNIDAZOLE (FLAGYL) IVPB premix 500 mg  500 mg IntraVENous Q12H    sodium chloride (NS) flush 5-40 mL  5-40 mL IntraVENous Q8H    sodium chloride (NS) flush 5-40 mL  5-40 mL IntraVENous PRN    acetaminophen (TYLENOL) tablet 650 mg  650 mg Oral Q6H PRN    Or    acetaminophen (TYLENOL) suppository 650 mg  650 mg Rectal Q6H PRN    polyethylene glycol (MIRALAX) packet 17 g  17 g Oral DAILY PRN    insulin lispro (HUMALOG) injection   SubCUTAneous QID WITH MEALS    glucose chewable tablet 16 g  4 Tab Oral PRN    dextrose (D50W) injection syrg 12.5-25 g  12.5-25 g IntraVENous PRN    glucagon (GLUCAGEN) injection 1 mg  1 mg IntraMUSCular PRN    sacubitriL-valsartan (ENTRESTO) 49-51 mg tablet 1 Tab  1 Tab Oral Q12H    insulin glargine (LANTUS) injection 25 Units  25 Units SubCUTAneous DAILY    enoxaparin (LOVENOX) injection 40 mg  40 mg SubCUTAneous Q24H    rOPINIRole (REQUIP) tablet 4 mg  4 mg Oral QHS    rosuvastatin (CRESTOR) tablet 10 mg  10 mg Oral DAILY    DULoxetine (CYMBALTA) capsule 120 mg  120 mg Oral DAILY    carvediloL (COREG) tablet 25 mg  25 mg Oral BID WITH MEALS          Lab Review:     Recent Labs     10/12/20  0014 10/11/20  1455   WBC 7.6 6.1   HGB 13.1 13.5   HCT 38.8 39.3    256     Recent Labs     10/13/20  0521 10/12/20  0014 10/11/20  1455    134* 136   K 3.9 4.1 4.1    103 103   CO2 24 26 25   * 128* 267*   BUN 20 20 18   CREA 1.04 1.13 1.33*   CA 8.2* 8.2* 8.5   MG 2.1 2.1  --    PHOS 2.4* 3.2  --    ALB  --  2.5* 2.6*   TBILI  --  0.4 0.4   ALT  --  11* 11*     Lab Results   Component Value Date/Time    Glucose (POC) 116 (H) 10/13/2020 08:41 AM    Glucose (POC) 137 (H) 10/13/2020 06:05 AM    Glucose (POC) 190 (H) 10/12/2020 09:16 PM    Glucose (POC) 201 (H) 10/12/2020 04:27 PM    Glucose (POC) 114 (H) 10/12/2020 11:56 AM          Assessment / Plan:     61 yo hx of HTN, DM, sCHF EF 15-20%, CKD 3, presented w/ R hand cellulitis    1) R hand cellulitis/abscess: due to insect bite or nail puncture. Not septic. S/p I&D by ortho on 10/13. Will monitor cultures. Empirically on IV Vanc, cefepime, Flagyl. Use IV dilaudid prn severe pain. Cont PT/OT    2) Chronic systolic CHF: EF 19-38% s/p ICD. Follows by Dr. Michelle Lomas. Volume stable. Repeat echo pending. Cont entresto, coreg, bumex, statin. Cards following    3) HTN: cont coreg, entresto    4) DM type 2 w/ renal complications: M4B 9.3%. Cont Lantus, SSI    5) CKD 3: Cr at baseline.   Will monitor     6) Depression/anxiety: cont cymbalta    Total time spent with patient: 30 min **I personally saw and examined the patient during this time period**                 Care Plan discussed with: Patient, nursing, family    Discussed:  Care Plan    Prophylaxis:  Lovenox    Disposition:  Home w/Family           ___________________________________________________    Attending Physician: Zabrina Ortiz MD

## 2020-10-13 NOTE — ANESTHESIA PREPROCEDURE EVALUATION
Anesthetic History   No history of anesthetic complications            Review of Systems / Medical History  Patient summary reviewed and pertinent labs reviewed    Pulmonary          Smoker         Neuro/Psych   Within defined limits           Cardiovascular  Within defined limits  Hypertension              Exercise tolerance: >4 METS     GI/Hepatic/Renal  Within defined limits              Endo/Other    Diabetes: poorly controlled, type 2         Other Findings              Physical Exam    Airway  Mallampati: I  TM Distance: 4 - 6 cm  Neck ROM: normal range of motion   Mouth opening: Normal     Cardiovascular    Rhythm: regular  Rate: normal         Dental  No notable dental hx       Pulmonary  Breath sounds clear to auscultation               Abdominal         Other Findings            Anesthetic Plan    ASA: 3  Anesthesia type: general            Anesthetic plan and risks discussed with: Patient

## 2020-10-13 NOTE — ANESTHESIA POSTPROCEDURE EVALUATION
Procedure(s):  INCISION AND DRAINAGE RIGHT HAND. general    Anesthesia Post Evaluation      Multimodal analgesia: multimodal analgesia used between 6 hours prior to anesthesia start to PACU discharge  Patient location during evaluation: PACU  Patient participation: complete - patient participated  Level of consciousness: awake and alert  Pain management: adequate  Airway patency: patent  Anesthetic complications: no  Cardiovascular status: acceptable  Respiratory status: acceptable  Hydration status: acceptable  Post anesthesia nausea and vomiting:  none      INITIAL Post-op Vital signs:   Vitals Value Taken Time   /65 10/13/2020  9:05 AM   Temp 37 °C (98.6 °F) 10/13/2020  8:36 AM   Pulse 62 10/13/2020  9:07 AM   Resp 17 10/13/2020  9:07 AM   SpO2 95 % 10/13/2020  9:07 AM   Vitals shown include unvalidated device data.

## 2020-10-13 NOTE — PROGRESS NOTES
1655:  /83. Patient is resting quietly without pain. MD notified, awaiting orders. 1817: BP improved following hydralazine administration, 151/75.

## 2020-10-13 NOTE — PROGRESS NOTES
Bedside and Verbal shift change report given to Riya Werner RN (oncoming nurse) by Pierre Mckenna RN (offgoing nurse). Report included the following information SBAR, Intake/Output, MAR and Recent Results.  Noted NPO since MN, pt currently in pre-op for procedure this am.

## 2020-10-13 NOTE — PROGRESS NOTES
Vancomycin trough, drawn 11.7 hours post-dose, was 19.2 mcg/ml. This extrapolates to 19.0 mcg/ml, which is above goal.  Will reduce dose to 1000 mg IV q12H, with next dose at 2300 today. This predicts a trough of 13.0 mcg/ml and an AUC of 444.

## 2020-10-13 NOTE — PROGRESS NOTES
Bedside and Verbal shift change report given to Dorothy RN (oncoming nurse) by Tennille Nicolas RN (offgoing nurse). Report included the following information SBAR, Kardex, MAR, Accordion and Recent Results.

## 2020-10-13 NOTE — OP NOTES
OPERATIVE REPORT    Admit Date: 10/11/2020  Admit Diagnosis: Cellulitis and abscess of hand [L03.119, L02.519]    Date of Procedure: 10/13/2020   Preoperative Diagnosis: RIGHT HAND ABSCESS  Postoperative Diagnosis: RIGHT HAND ABSCESS    Procedure: Procedure(s):  INCISION AND DRAINAGE RIGHT HAND  Surgeon: Satya Velarde MD  Assistant(s):  none  Anesthesia: General   Estimated Blood Loss: 20cc  Specimens:   ID Type Source Tests Collected by Time Destination   1 : Right Hand Abcess Wound Hand, Right AEROBIC/ANAEROBIC CULTURE Ethan Sultana MD 10/13/2020 0805 Microbiology      Complications: None      INDICATIONS:  Nohemi Guo is a patient with  Right volar hand palmar abscess hand small finger flexor tenosynovitis and was indicated for surgery. We discussed risks, alternatives and expectations prior to surgery. The patient was seen for medical clearance. PROCEDURE PERFORMED :   The patient was seen in the pre-operative holding area and the proper limb initialized. Questions were answered and the patient was taken to the operating room for anesthesia. They were positioned on the table and the operative extremity was prepped and draped in a sterile fashion. The appropriate antibiotics were given and time-out was performed prior to the incision. following this, Acie Gomez incision was fashioned in the palm extended to the palmar digital crease of the small finger. Immediate expression was purulent material which was sent for culture, this extended to the flexor tendon sheath with there was fluid about the A1 pulley. The tissues were sharply debrided including the skin subcutaneous tissues and a portion the tendon sheath, portion of the A1 pulley was released as well exposing the tendons, there appeared to be rupture of his FDS tendon tendon edge was debrided and allowed to retract, there was some fluid proximally along the tendon sheath however was unable to express this with pressure and the palm. Additional incision was made distally at the finger tip of the small finger. The A5 pulley was identified and divided exposing the flexor tendon sheath, a 16 gauge angiocatheter was used to irrigate the sheath was several 60 cc syringe irrigation fluid which was lactated Ringer's. The tendon sheath distally did not appear to be involved as it did proximally. Following this, the wound was then closed loosely the palmar portion was packed open with iodoform. I recommend daily whirlpool or soaks with packing changes finger range of motion, bulky dressing with Kerlix and follow-up in about 10 days.       Praveen Oconnell MD  [phone]

## 2020-10-13 NOTE — PERIOP NOTES
Pt unable to remove wedding ring, it was secured with tape.  Glasses and phone were left at bedside, 524, per pt's request.

## 2020-10-13 NOTE — PROGRESS NOTES
Alber Loyd MD, [address] [place] [address]  Marilou Friedman 34  [phone]      IMPRESSION and RECOMMENDATIONS     1. Non-ischemic CM:  Stable and euvolumic on current regimen. Cont coreg, entresto, etc.  Regarding MRI, his [employer] S-ICD is MRI safe. Doing well s/p hand surgery,     2.  Dyslipidemia:  Cont crestor. No other recs at this time. Will see prn, but let me know if issues arise.     I have discussed this plan with the patient. He appears to understand this plan and wishes to proceed ahead.                                 Subjective:       No complaints s/p hand surgery. Objective:     Patient Vitals for the past 16 hrs:   BP Temp Pulse Resp SpO2   10/13/20 0836 118/69 98.6 °F (37 °C) 60 15 100 %   10/13/20 0835 124/65  60 16 100 %   10/13/20 0608 (!) 143/69 98.6 °F (37 °C) 62 18 98 %   10/13/20 0347 (!) 148/72 98.2 °F (36.8 °C) 64 18 94 %   10/12/20 2323 137/89 98 °F (36.7 °C) 67 18 98 %   10/12/20 2313   66     10/12/20 2005 (!) 158/71 97.9 °F (36.6 °C) 64 18 97 %       HEENT Exam:     WNL         Lung Exam:     The patient is not dyspneic. Breath sounds are heard equally in all lung fields. There are no wheezes, rales, rhonchi, or rubs heard on auscultation. Heart Exam:     The rhythm is regular. The PMI is in the 5th intercostal space of the MCL. Apical impulse is normal. S1 is regular. S2 is physiologic. There is no S3, S4 gallop, murmur, click, or rub. Abdomen Exam:     Benign. Extremities Exam:     No cyanosis, clubbing, edema. Distal pulses intact.            Lab Results   Component Value Date/Time    Glucose 142 (H) 10/13/2020 05:21 AM    Sodium 137 10/13/2020 05:21 AM    Potassium 3.9 10/13/2020 05:21 AM    Chloride 107 10/13/2020 05:21 AM    CO2 24 10/13/2020 05:21 AM    BUN 20 10/13/2020 05:21 AM    Creatinine 1.04 10/13/2020 05:21 AM    Calcium 8.2 (L) 10/13/2020 05:21 AM     Recent Labs     10/12/20  0014 10/11/20  1455 WBC 7.6 6.1   HGB 13.1 13.5   HCT 38.8 39.3    256     Recent Labs     10/12/20  0014 10/11/20  1455   ALT 11* 11*   AP 99 98   TBILI 0.4 0.4   TP 6.2* 6.3*   ALB 2.5* 2.6*   GLOB 3.7 3.7     No results for input(s): INR, PTP, APTT, INREXT in the last 72 hours. No results for input(s): CPK, CKMB, TNIPOC in the last 72 hours. No lab exists for component: TROPONINI, ITNL  No results for input(s): TROIQ in the last 72 hours.   No results found for: CHOL, CHOLX, CHLST, CHOLV, HDL, HDLP, LDL, LDLC, DLDLP, TGLX, TRIGL, TRIGP, CHHD, CHHDX

## 2020-10-14 LAB
ANION GAP SERPL CALC-SCNC: 6 MMOL/L (ref 5–15)
BUN SERPL-MCNC: 20 MG/DL (ref 6–20)
BUN/CREAT SERPL: 19 (ref 12–20)
CALCIUM SERPL-MCNC: 8.2 MG/DL (ref 8.5–10.1)
CHLORIDE SERPL-SCNC: 106 MMOL/L (ref 97–108)
CO2 SERPL-SCNC: 26 MMOL/L (ref 21–32)
CREAT SERPL-MCNC: 1.03 MG/DL (ref 0.7–1.3)
ERYTHROCYTE [DISTWIDTH] IN BLOOD BY AUTOMATED COUNT: 12.5 % (ref 11.5–14.5)
GLUCOSE BLD STRIP.AUTO-MCNC: 104 MG/DL (ref 65–100)
GLUCOSE BLD STRIP.AUTO-MCNC: 108 MG/DL (ref 65–100)
GLUCOSE BLD STRIP.AUTO-MCNC: 168 MG/DL (ref 65–100)
GLUCOSE BLD STRIP.AUTO-MCNC: 180 MG/DL (ref 65–100)
GLUCOSE SERPL-MCNC: 100 MG/DL (ref 65–100)
HCT VFR BLD AUTO: 38.9 % (ref 36.6–50.3)
HGB BLD-MCNC: 13.1 G/DL (ref 12.1–17)
MAGNESIUM SERPL-MCNC: 2 MG/DL (ref 1.6–2.4)
MCH RBC QN AUTO: 30 PG (ref 26–34)
MCHC RBC AUTO-ENTMCNC: 33.7 G/DL (ref 30–36.5)
MCV RBC AUTO: 89.2 FL (ref 80–99)
NRBC # BLD: 0 K/UL (ref 0–0.01)
NRBC BLD-RTO: 0 PER 100 WBC
PHOSPHATE SERPL-MCNC: 2.3 MG/DL (ref 2.6–4.7)
PLATELET # BLD AUTO: 256 K/UL (ref 150–400)
PMV BLD AUTO: 9.5 FL (ref 8.9–12.9)
POTASSIUM SERPL-SCNC: 3.6 MMOL/L (ref 3.5–5.1)
RBC # BLD AUTO: 4.36 M/UL (ref 4.1–5.7)
SERVICE CMNT-IMP: ABNORMAL
SODIUM SERPL-SCNC: 138 MMOL/L (ref 136–145)
WBC # BLD AUTO: 5.5 K/UL (ref 4.1–11.1)

## 2020-10-14 PROCEDURE — 74011250636 HC RX REV CODE- 250/636: Performed by: INTERNAL MEDICINE

## 2020-10-14 PROCEDURE — 80048 BASIC METABOLIC PNL TOTAL CA: CPT

## 2020-10-14 PROCEDURE — 82962 GLUCOSE BLOOD TEST: CPT

## 2020-10-14 PROCEDURE — 74011636637 HC RX REV CODE- 636/637: Performed by: INTERNAL MEDICINE

## 2020-10-14 PROCEDURE — 85027 COMPLETE CBC AUTOMATED: CPT

## 2020-10-14 PROCEDURE — 84100 ASSAY OF PHOSPHORUS: CPT

## 2020-10-14 PROCEDURE — 65660000000 HC RM CCU STEPDOWN

## 2020-10-14 PROCEDURE — 77030018836 HC SOL IRR NACL ICUM -A

## 2020-10-14 PROCEDURE — 83735 ASSAY OF MAGNESIUM: CPT

## 2020-10-14 PROCEDURE — 36415 COLL VENOUS BLD VENIPUNCTURE: CPT

## 2020-10-14 PROCEDURE — 97165 OT EVAL LOW COMPLEX 30 MIN: CPT | Performed by: OCCUPATIONAL THERAPIST

## 2020-10-14 PROCEDURE — 74011250637 HC RX REV CODE- 250/637: Performed by: INTERNAL MEDICINE

## 2020-10-14 PROCEDURE — 97535 SELF CARE MNGMENT TRAINING: CPT | Performed by: OCCUPATIONAL THERAPIST

## 2020-10-14 RX ADMIN — VANCOMYCIN HYDROCHLORIDE 1000 MG: 1 INJECTION, POWDER, LYOPHILIZED, FOR SOLUTION INTRAVENOUS at 08:32

## 2020-10-14 RX ADMIN — VANCOMYCIN HYDROCHLORIDE 1000 MG: 1 INJECTION, POWDER, LYOPHILIZED, FOR SOLUTION INTRAVENOUS at 19:57

## 2020-10-14 RX ADMIN — Medication 10 ML: at 05:29

## 2020-10-14 RX ADMIN — METRONIDAZOLE 500 MG: 500 INJECTION, SOLUTION INTRAVENOUS at 05:29

## 2020-10-14 RX ADMIN — DULOXETINE HYDROCHLORIDE 120 MG: 30 CAPSULE, DELAYED RELEASE ORAL at 21:48

## 2020-10-14 RX ADMIN — OXYCODONE 5 MG: 5 TABLET ORAL at 10:03

## 2020-10-14 RX ADMIN — ROPINIROLE HYDROCHLORIDE 4 MG: 1 TABLET, FILM COATED ORAL at 21:48

## 2020-10-14 RX ADMIN — SACUBITRIL AND VALSARTAN 1 TABLET: 49; 51 TABLET, FILM COATED ORAL at 10:05

## 2020-10-14 RX ADMIN — DOCUSATE SODIUM 50MG AND SENNOSIDES 8.6MG 1 TABLET: 8.6; 5 TABLET, FILM COATED ORAL at 08:26

## 2020-10-14 RX ADMIN — CARVEDILOL 25 MG: 12.5 TABLET, FILM COATED ORAL at 08:26

## 2020-10-14 RX ADMIN — INSULIN LISPRO 2 UNITS: 100 INJECTION, SOLUTION INTRAVENOUS; SUBCUTANEOUS at 11:51

## 2020-10-14 RX ADMIN — ENOXAPARIN SODIUM 40 MG: 40 INJECTION SUBCUTANEOUS at 21:48

## 2020-10-14 RX ADMIN — INSULIN GLARGINE 35 UNITS: 100 INJECTION, SOLUTION SUBCUTANEOUS at 08:26

## 2020-10-14 RX ADMIN — CARVEDILOL 25 MG: 12.5 TABLET, FILM COATED ORAL at 18:15

## 2020-10-14 RX ADMIN — SACUBITRIL AND VALSARTAN 1 TABLET: 49; 51 TABLET, FILM COATED ORAL at 21:55

## 2020-10-14 RX ADMIN — ROSUVASTATIN CALCIUM 10 MG: 10 TABLET, COATED ORAL at 21:48

## 2020-10-14 RX ADMIN — Medication 10 ML: at 21:48

## 2020-10-14 RX ADMIN — BUMETANIDE 1 MG: 1 TABLET ORAL at 08:26

## 2020-10-14 NOTE — PROGRESS NOTES
Marcio Rubio [facility] 79  [facility] [facility], [address] [place] [address]  [phone]      Medical Progress Note      NAME: Josef Branham   :  1955  MRM:  [id number]    Date/Time of service: 10/14/2020  11:39 AM       Subjective:     Chief Complaint:  Patient was personally seen and examined by me during this time period. Chart reviewed. Hand is feeling better. No fevers/chills       Objective:       Vitals:       Last 24hrs VS reviewed since prior progress note.  Most recent are:    Visit Vitals  /72 (BP 1 Location: Right arm, BP Patient Position: At rest)   Pulse 68   Temp 98.2 °F (36.8 °C)   Resp 18   Ht 6' (1.829 m)   Wt 107 kg (236 lb)   SpO2 98%   BMI 32.01 kg/m²     SpO2 Readings from Last 6 Encounters:   10/14/20 98%   14 93%    O2 Flow Rate (L/min): 3 l/min       Intake/Output Summary (Last 24 hours) at 10/14/2020 1233  Last data filed at 10/14/2020 8573  Gross per 24 hour   Intake 1420 ml   Output 250 ml   Net 1170 ml        Exam:     Physical Exam:    Gen:  Well-developed, well-nourished, obese, in no acute distress  HEENT:  Pink conjunctivae, PERRL, hearing intact to voice, moist mucous membranes  Neck:  Supple, without masses, thyroid non-tender  Resp:  No accessory muscle use, clear breath sounds without wheezes rales or rhonchi  Card:  No murmurs, normal S1, S2 without thrills, bruits or peripheral edema  Abd:  Soft, non-tender, non-distended, normoactive bowel sounds are present  Musc:  No cyanosis or clubbing  Skin:  R hand dressing c/d/i  Neuro:  Cranial nerves 3-12 are grossly intact, follows commands appropriately  Psych:  Good insight, oriented to person, place and time, alert    Medications Reviewed: (see below)    Lab Data Reviewed: (see below)    ______________________________________________________________________    Medications:     Current Facility-Administered Medications   Medication Dose Route Frequency    [START ON 10/15/2020] Vancomycin trough 10/15 prior to 2000 dose   Other ONCE    vancomycin (VANCOCIN) 1,000 mg in 0.9% sodium chloride (MBP/ADV) 250 mL  1,000 mg IntraVENous Q12H    oxyCODONE IR (ROXICODONE) tablet 5 mg  5 mg Oral Q4H PRN    HYDROmorphone (PF) (DILAUDID) injection 1 mg  1 mg IntraVENous Q6H PRN    senna-docusate (PERICOLACE) 8.6-50 mg per tablet 1 Tab  1 Tab Oral DAILY    insulin glargine (LANTUS) injection 35 Units  35 Units SubCUTAneous DAILY    bumetanide (BUMEX) tablet 1 mg  1 mg Oral DAILY    sodium chloride (NS) flush 5-40 mL  5-40 mL IntraVENous Q8H    sodium chloride (NS) flush 5-40 mL  5-40 mL IntraVENous PRN    acetaminophen (TYLENOL) tablet 650 mg  650 mg Oral Q6H PRN    Or    acetaminophen (TYLENOL) suppository 650 mg  650 mg Rectal Q6H PRN    polyethylene glycol (MIRALAX) packet 17 g  17 g Oral DAILY PRN    insulin lispro (HUMALOG) injection   SubCUTAneous QID WITH MEALS    glucose chewable tablet 16 g  4 Tab Oral PRN    dextrose (D50W) injection syrg 12.5-25 g  12.5-25 g IntraVENous PRN    glucagon (GLUCAGEN) injection 1 mg  1 mg IntraMUSCular PRN    sacubitriL-valsartan (ENTRESTO) 49-51 mg tablet 1 Tab  1 Tab Oral Q12H    enoxaparin (LOVENOX) injection 40 mg  40 mg SubCUTAneous Q24H    rOPINIRole (REQUIP) tablet 4 mg  4 mg Oral QHS    rosuvastatin (CRESTOR) tablet 10 mg  10 mg Oral DAILY    DULoxetine (CYMBALTA) capsule 120 mg  120 mg Oral DAILY    carvediloL (COREG) tablet 25 mg  25 mg Oral BID WITH MEALS          Lab Review:     Recent Labs     10/14/20  0724 10/12/20  0014 10/11/20  1455   WBC 5.5 7.6 6.1   HGB 13.1 13.1 13.5   HCT 38.9 38.8 39.3    255 256     Recent Labs     10/14/20  0724 10/13/20  0521 10/12/20  0014 10/11/20  1455    137 134* 136   K 3.6 3.9 4.1 4.1    107 103 103   CO2 26 24 26 25    142* 128* 267*   BUN 20 20 20 18   CREA 1.03 1.04 1.13 1.33*   CA 8.2* 8.2* 8.2* 8.5   MG 2.0 2.1 2.1  --    PHOS 2.3* 2.4* 3.2  --    ALB  --   --  2.5* 2.6*   TBILI  -- --  0.4 0.4   ALT  --   --  11* 11*     Lab Results   Component Value Date/Time    Glucose (POC) 180 (H) 10/14/2020 11:29 AM    Glucose (POC) 104 (H) 10/14/2020 06:53 AM    Glucose (POC) 159 (H) 10/13/2020 09:36 PM    Glucose (POC) 273 (H) 10/13/2020 04:25 PM    Glucose (POC) 235 (H) 10/13/2020 12:41 PM          Assessment / Plan:     59 yo hx of HTN, DM, sCHF EF 15-20%, CKD 3, presented w/ R hand cellulitis    1) R hand cellulitis/abscess: due to insect bite or nail puncture. Not septic. S/p I&D by ortho on 10/13. Cx growing staph epi. Empirically on IV Vanc. Will d/c cefepime, Flagyl today. Use IV dilaudid prn severe pain. Cont PT/OT, wound care    2) Chronic systolic CHF: prior EF 66-95% s/p ICD, but repeat echo with EF 50%. Follows by Dr. Elan Martin. Volume stable. Cont entresto, coreg, bumex, statin. Cards following    3) HTN: cont coreg, entresto    4) DM type 2 w/ renal complications: Y9W 2.4%. Cont Lantus, SSI    5) CKD 3: Cr at baseline.   Will monitor     6) Depression/anxiety: cont cymbalta    Total time spent with patient: 25 min **I personally saw and examined the patient during this time period**                 Care Plan discussed with: Patient, nursing    Discussed:  Care Plan    Prophylaxis:  Lovenox    Disposition:  Home w/Family tomorrow            ___________________________________________________    Attending Physician: Santosh Ely MD

## 2020-10-14 NOTE — PROGRESS NOTES
10/14/20   CARE MANAGEMENT NOTE:  CM reviewed EMR for clinical updates. Pt was admitted with right hand cellulitis/abscess. Pt is s/p I&D on 10/13. Reportedly, pt resides with his wife Cora Andres ([phone]). RUR 14%    Transition Plan of Care:  1. Plan is to return home without any anticipated post discharge needs at this writing. 2.  Outpt f/u  3. Pt will arrange his own transportation home.     CM will continue to follow pt until discharged.   Stew

## 2020-10-14 NOTE — PROGRESS NOTES
Orthopaedic Progress Note  Post Op day: 1 Day Post-Op    2020 10:27 AM     Patient: Nohemi Guo MRN: [id number]  SSN: [id number]    YOB: 1955  Age: 60 y.o. Sex: male      Admit date:  10/11/2020  Date of Surgery:  10/13/2020   Procedures:  Procedure(s):  INCISION AND DRAINAGE RIGHT HAND  Admitting Physician:  Prakash Carrasquillo MD   Surgeon:  Yesenia Carrington) and Role:     Jael Kovacs MD - Primary    Consulting Physician(s): Treatment Team: Attending Provider: Bertin Walls MD; Consulting Provider: Annita Quiles MD; Consulting Provider: Antonio Garcia MD; Care Manager: Derrel Primrose Consulting Provider: Larry Giordano MD; Utilization Review: Neno Yousif RN    SUBJECTIVE:     Nohemi Guo is a 60 y.o. male is 1 Day Post-Op s/p Procedure(s):  INCISION AND DRAINAGE RIGHT HAND with an appropriate level of post-operative pain. No complaints of nausea, vomiting, dizziness, lightheadedness, chest pain, or shortness of breath. OBJECTIVE:       Physical Exam:  General: Alert, cooperative, no distress. Respiratory: Respirations unlabored  Neurological:  Neurovascular exam within normal limits. Motor: + DF/PF. Musculoskeletal: Calves soft, supple, non-tender upon palpation. Dressing/Wound:  Clean, dry and intact. No significant erythema or swelling.       Vital Signs:        Patient Vitals for the past 8 hrs:   BP Temp Pulse Resp SpO2   10/14/20 0701   63     10/14/20 0700 127/73 98.2 °F (36.8 °C) 62 18 98 %   10/14/20 0302 115/63 98.3 °F (36.8 °C) 66 19 96 %                                          Temp (24hrs), Av.5 °F (36.9 °C), Min:98.1 °F (36.7 °C), Max:99.4 °F (37.4 °C)      Labs:        Recent Labs     10/14/20  0724   HCT 38.9   HGB 13.1     Lab Results   Component Value Date/Time    Sodium 138 10/14/2020 07:24 AM    Potassium 3.6 10/14/2020 07:24 AM    Chloride 106 10/14/2020 07:24 AM    CO2 26 10/14/2020 07:24 AM    Glucose 100 10/14/2020 07:24 AM    BUN 20 10/14/2020 07:24 AM    Creatinine 1.03 10/14/2020 07:24 AM    Calcium 8.2 (L) 10/14/2020 07:24 AM       PT/OT:                Patient mobility  Bed Mobility Training  Rolling: Independent  Supine to Sit: Independent  Sit to Supine: Independent  Scooting: Independent  Transfer Training  Sit to Stand: Independent  Stand to Sit: Independent  Bed to Chair: Modified independent                   ASSESSMENT / PLAN:   Active Problems:    Type II diabetes mellitus ([facility] (7/2/2014)      HTN (hypertension) (7/2/2014)      RLS (restless legs syndrome) (7/2/2014)      Psychiatric disorder ()      Overview: depression and anxiety      Unspecified sleep apnea ()      Overview: lost weight      CKD (chronic kidney disease) stage 3, GFR 30-59 ml/min ()      HFrEF ([facility] failure with reduced ejection fraction) [facility] (10/11/2020)      Cellulitis and abscess of hand (10/11/2020)                    Pain Control: Adequate with oral agents    DVT Prophylaxis: Lovenox daily, SCDs    Therapy/ Weight bearing: NWB RUE   Wound/ incisional issues: C, D & I, Whirlpool packing change today. Medical concerns: ID consult for home abx recommendations   Disposition: Home w/ Family when cleared by therapy. Castaneda abscess I&D yesterday. Keep hand elevated in cheeseblock. Whirlpool packing change today. Dr. Bladimir Mayberry agrees with plan.     Signed By:  Chris June PA-C    Orthopedic Surgery   [address] [place] [address]

## 2020-10-14 NOTE — PROGRESS NOTES
Bedside and Verbal shift change report given to Shaneka Sanderson. RN (oncoming nurse) by Claudette Teixeira RN (offgoing nurse). Report included the following information SBAR, Intake/Output, MAR and Recent Results.

## 2020-10-14 NOTE — PROGRESS NOTES
Aftab Huntley MD, [address] [place] [address]  Marilou Friedman 34  [phone]      IMPRESSION and RECOMMENDATIONS     1. Non-ischemic CM:  Stable and euvolumic on current regimen. Cont coreg, entresto, etc.  Regarding MRI, his [employer] S-ICD is MRI safe. Doing well s/p hand surgery. Of note, LV function has significantly improved: discussed with Pt.     2. Dyslipidemia:  Cont crestor. No other recs at this time. Will see prn, but let me know if issues arise.     I have discussed this plan with the patient. He appears to understand this plan and wishes to proceed ahead.                                 Subjective:       No complaints s/p hand surgery save mild soreness. Objective:     Patient Vitals for the past 16 hrs:   BP Temp Pulse Resp SpO2   10/14/20 0701   63     10/14/20 0700 127/73 98.2 °F (36.8 °C) 62 18 98 %   10/14/20 0302 115/63 98.3 °F (36.8 °C) 66 19 96 %   10/13/20 2334 130/61 98.4 °F (36.9 °C) 73 20 97 %   10/13/20 2302   74     10/13/20 1955 (!) 143/67 99.4 °F (37.4 °C) 75 20 96 %   10/13/20 1817 (!) 151/75       10/13/20 1754 (!) 194/80       10/13/20 1733 (!) 189/73           HEENT Exam:     WNL         Lung Exam:     The patient is not dyspneic. Breath sounds are heard equally in all lung fields. There are no wheezes, rales, rhonchi, or rubs heard on auscultation. Heart Exam:     The rhythm is regular. The PMI is in the 5th intercostal space of the MCL. Apical impulse is normal. S1 is regular. S2 is physiologic. There is no S3, S4 gallop, murmur, click, or rub. Abdomen Exam:     Benign. Extremities Exam:     No cyanosis, clubbing, edema. Distal pulses intact.            Lab Results   Component Value Date/Time    Glucose 100 10/14/2020 07:24 AM    Sodium 138 10/14/2020 07:24 AM    Potassium 3.6 10/14/2020 07:24 AM    Chloride 106 10/14/2020 07:24 AM    CO2 26 10/14/2020 07:24 AM    BUN 20 10/14/2020 07:24 AM Creatinine 1.03 10/14/2020 07:24 AM    Calcium 8.2 (L) 10/14/2020 07:24 AM     Recent Labs     10/14/20  0724 10/12/20  0014   WBC 5.5 7.6   HGB 13.1 13.1   HCT 38.9 38.8    255     Recent Labs     10/12/20  0014 10/11/20  1455   ALT 11* 11*   AP 99 98   TBILI 0.4 0.4   TP 6.2* 6.3*   ALB 2.5* 2.6*   GLOB 3.7 3.7     No results for input(s): INR, PTP, APTT, INREXT, INREXT in the last 72 hours. No results for input(s): CPK, CKMB, TNIPOC in the last 72 hours. No lab exists for component: TROPONINI, ITNL  No results for input(s): TROIQ in the last 72 hours. No results found for: CHOL, CHOLX, CHLST, CHOLV, HDL, HDLP, LDL, LDLC, DLDLP, TGLX, TRIGL, TRIGP, CHHD, CHHDX    Echo:  · LV: Calculated LVEF is 50%. Normal cavity size and wall thickness. Segmentally reduced systolic function. Mild (grade 1) left ventricular diastolic dysfunction. · LA: Mildly dilated left atrium.

## 2020-10-14 NOTE — PROGRESS NOTES
ID.  Chart reviewed. Agree with current antibiotics. Surgical cultures reveal no growth to date.   Full consult to follow

## 2020-10-14 NOTE — PROGRESS NOTES
OCCUPATIONAL THERAPY EVALUATION/DISCHARGE  Patient: Juan Carlos Uriostegui (70 y.o. male)  Date: 10/14/2020  Primary Diagnosis: Cellulitis and abscess of hand [L03.119, L02.519]  Procedure(s) (LRB):  INCISION AND DRAINAGE RIGHT HAND (Right) 1 Day Post-Op   Precautions:  (elevate R hand)    ASSESSMENT  Based on the objective data described below, the patient presents at an overall set-up to mod I level with ADLs and functional mobility. Instructed pt on modifications to ADLs as he is R handed and using built up foam for utensils, which he was already doing at home. Initiated AROM exer program and pt demonstrated independence. No further skilled acute OT services required at this time. Current Level of Function (ADLs/self-care):  set-up to mod I level with ADLs and functional mobility    Functional Outcome Measure: The patient scored 80/100 on the Barthel Index outcome measure. Other factors to consider for discharge: see above     PLAN :  Recommend with staff: up ad aakash    Recommendation for discharge: (in order for the patient to meet his/her long term goals)  Outpatient occupational therapy follow up recommended for R hand per MD    This discharge recommendation:  Has not yet been discussed the attending provider and/or case management    IF patient discharges home will need the following DME: none       SUBJECTIVE:   Patient stated I feel better.     OBJECTIVE DATA SUMMARY:   HISTORY:   Past Medical History:   Diagnosis Date    Diabetes ([facility]     Type 2    Diabetic foot ulcer ([facility] 7/2/2014    Foot ulcer due to secondary DM ([facility] [address]     Hypertension     Psychiatric disorder     depression and anxiety    Restless leg syndrome     Streptococcal bacteremia     elbow    Type II or unspecified type diabetes mellitus without mention of complication, uncontrolled 7/2/2014    Unspecified sleep apnea     lost weight     Past Surgical History:   Procedure Laterality Date    CARDIAC SURG PROCEDURE UNLIST      HX ORTHOPAEDIC      bone spur right foot.  HX PACEMAKER      AICD    HX UROLOGICAL      hydrocele repair left testicle    HX WISDOM TEETH EXTRACTION         Prior Level of Function/Environment/Context: Independent, active, works full time as a . States mild balance issues and RUE atrophy recently. Expanded or extensive additional review of patient history:   Home Situation  Home Environment: Private residence  # Steps to Enter: 5  Rails to Enter: Yes  Hand Rails : Right  Wheelchair Ramp: Yes  One/Two Story Residence: Two story  # of Interior Steps: 15  Interior Rails: Right  Living Alone: No  Support Systems: Spouse/Significant Other/Partner, Family member(s)(wife)  Patient Expects to be Discharged to[de-identified] Private residence  Current DME Used/Available at Home: None  Tub or Shower Type: Shower    Hand dominance: Right    EXAMINATION OF PERFORMANCE DEFICITS:  Cognitive/Behavioral Status:  Neurologic State: Alert; Appropriate for age  Orientation Level: Oriented X4  Cognition: Appropriate decision making; Appropriate for age attention/concentration; Appropriate safety awareness; Follows commands  Perception: Appears intact  Perseveration: No perseveration noted  Safety/Judgement: Awareness of environment;Driving appropriateness; Fall prevention;Good awareness of safety precautions; Home safety; Insight into deficits      Hearing: Auditory  Auditory Impairment: None    Vision/Perceptual:    Acuity: Within Defined Limits    Corrective Lenses: Glasses    Range of Motion:  AROM: Generally decreased, functional(imp R hand post I and D)  PROM: Generally decreased, functional(imp R hand post I and D)                      Strength:  Strength: Generally decreased, functional(imp R hand post I and D)                Coordination:  Coordination: Generally decreased, functional(imp R hand post I and D)  Fine Motor Skills-Upper: Left Intact; Right Impaired    Gross Motor Skills-Upper: Left Intact; Right Intact    Tone & Sensation:  Tone: Normal  Sensation: Intact                      Balance:  Sitting: Intact  Standing: Intact    Functional Mobility and Transfers for ADLs:  Bed Mobility:  Rolling: Independent  Supine to Sit: Independent  Sit to Supine: Independent  Scooting: Independent    Transfers:  Sit to Stand: Independent  Stand to Sit: Independent  Bed to Chair: Modified independent  Bathroom Mobility: Modified independent  Toilet Transfer : Modified independent    ADL Assessment and Intervention:  Feeding: Setup(A to open containers- uses built up foam at home)    Oral Facial Hygiene/Grooming: Setup    Bathing: Modified independent    Upper Body Dressing: Setup    Lower Body Dressing: Modified independent    Toileting: Modified independent     Cognitive Retraining  Safety/Judgement: Awareness of environment;Driving appropriateness; Fall prevention;Good awareness of safety precautions; Home safety; Insight into deficits       Functional Measure:  Barthel Index:    Bathin  Bladder: 5  Bowels: 10  Groomin  Dressin  Feedin  Mobility: 15  Stairs: 10  Toilet Use: 10  Transfer (Bed to Chair and Back): 15  Total: 80/100        The Barthel ADL Index: Guidelines  1. The index should be used as a record of what a patient does, not as a record of what a patient could do. 2. The main aim is to establish degree of independence from any help, physical or verbal, however minor and for whatever reason. 3. The need for supervision renders the patient not independent. 4. A patient's performance should be established using the best available evidence. Asking the patient, friends/relatives and nurses are the usual sources, but direct observation and common sense are also important. However direct testing is not needed. 5. Usually the patient's performance over the preceding 24-48 hours is important, but occasionally longer periods will be relevant.   6. Middle categories imply that the patient supplies over 50 per cent of the effort. 7. Use of aids to be independent is allowed. Aylin Quiñonez., Barthel, D.WVeronica ([id number]). Functional evaluation: the Barthel Index. [facility] (14)2. INESSA Youngblood, Divine Lopez., Brian Lilly., Ginger Seal, [facility] (). Measuring the change indisability after inpatient rehabilitation; comparison of the responsiveness of the Barthel Index and Functional [place] Measure. Journal of Neurology, Neurosurgery, and Psychiatry, 66(4), 449-929. CARMITA Newman.A, YOANA Sierra, & Tevin Marroquin M.A. (2004.) Assessment of post-stroke quality of life in cost-effectiveness studies: The usefulness of the Barthel Index and the EuroQoL-5D. Quality of Life Research, 15, 355-41       Occupational Therapy Evaluation Charge Determination   History Examination Decision-Making   LOW Complexity : Brief history review  LOW Complexity : 1-3 performance deficits relating to physical, cognitive , or psychosocial skils that result in activity limitations and / or participation restrictions  LOW Complexity : No comorbidities that affect functional and no verbal or physical assistance needed to complete eval tasks       Based on the above components, the patient evaluation is determined to be of the following complexity level: LOW   Pain Ratin/10    Activity Tolerance:   Good  Please refer to the flowsheet for vital signs taken during this treatment. After treatment patient left in no apparent distress:    Supine in bed and Call bell within reach    COMMUNICATION/EDUCATION:   The patients plan of care was discussed with: Registered nurse.      Thank you for this referral.  Yun Mcmahon OT  Time Calculation: 20 mins

## 2020-10-14 NOTE — PROGRESS NOTES
[address] Dosing Services: Antimicrobial Stewardship Progress Note 10/14/2020    Consult for antibiotic dosing of vancomycin by Dr. Blayne Bills  Pharmacist reviewed antibiotic appropriateness for 60year old , male  for indication of spider bit cellulitis  Day of Therapy 4  S/p I&D 10/13/20  Tx failure OP doxycycline plus bactrim    Plan:  Vancomycin therapy:  Start Vancomycin therapy, with loading dose of 2gm then 1.5gm q12h. Dose calculated to approximate a therapeutic trough of  10-15mcg/mL. Plan for level: Abx de-escalated to vancomycin monotherapy given culture results. Renal function stable. Will continue current dose for anticipated therapeutic trough. Will repeat trough level for tomorrow evening. Wound Cx with MRSE and cleared on repeat culture, blood culture negative. Pharmacy to follow daily and will make changes to dose and/or frequency based on clinical status. Date Dose & Interval Measured (mcg/mL) Extrapolated (mcg/mL)   ?10/11/20 ? 2gm x1 ? ?   ?10/12/20 ?1.5gm q12h ?19.2 ?   ? ? ? ? Other Antimicrobial  (not dosed by pharmacist)   None   Cultures     10/13 Repeat Wound Cx and anaerobes (I&D): NGTD, Prelim  10/12 MRSA: Negative, Final  10/11 Wound: MRSE, Final (S bactrim/doxy/linezolid/vanc/fluoroquinolone)  10/11 Blood (paired): NGTD, Prelim   Serum Creatinine     Lab Results   Component Value Date/Time    Creatinine 1.03 10/14/2020 07:24 AM    Creatinine (POC) 1.1 07/01/2014 05:51 PM       Creatinine Clearance Estimated Creatinine Clearance: 91.6 mL/min (based on SCr of 1.03 mg/dL).      Procalcitonin  No results found for: PCT     Temp   98.2 °F (36.8 °C)    WBC   Lab Results   Component Value Date/Time    WBC 5.5 10/14/2020 07:24 AM       H/H   Lab Results   Component Value Date/Time    HGB 13.1 10/14/2020 07:24 AM      Platelets Lab Results   Component Value Date/Time    PLATELET 752 05/93/0942 07:24 AM          Thanks,  Pharmacist: Signed LETY DotyD Contact information:

## 2020-10-14 NOTE — WOUND CARE
Wound care consult to change right hand dressing - s/p hand I and D per ortho -plan of care reviewed with MAGDIEL RAZO- orders obtained. Pain med prior to care given Assessment RIght hand dressing removed- irrigated with saline and packing removed. Palm of hand edematous and erythemic- 1x1x0.5 cm full thickness wound, red base, serous drainage- intact approximated sutures from wound to finger, no drainage- hands and fingers warm and mobile- good sensation. Wound irrigated- packed with iodoform guaze- xeroform to stitches with dry dressing and coban 
Hand elevated Wound care orders in place.  
MAGDIEL Mccain

## 2020-10-15 LAB
CREAT SERPL-MCNC: 0.95 MG/DL (ref 0.7–1.3)
DATE LAST DOSE: ABNORMAL
GLUCOSE BLD STRIP.AUTO-MCNC: 102 MG/DL (ref 65–100)
GLUCOSE BLD STRIP.AUTO-MCNC: 186 MG/DL (ref 65–100)
GLUCOSE BLD STRIP.AUTO-MCNC: 258 MG/DL (ref 65–100)
GLUCOSE BLD STRIP.AUTO-MCNC: 337 MG/DL (ref 65–100)
REPORTED DOSE,DOSE: ABNORMAL UNITS
REPORTED DOSE/TIME,TMG: ABNORMAL
SERVICE CMNT-IMP: ABNORMAL
VANCOMYCIN TROUGH SERPL-MCNC: 19.5 UG/ML (ref 5–10)

## 2020-10-15 PROCEDURE — 74011250637 HC RX REV CODE- 250/637: Performed by: INTERNAL MEDICINE

## 2020-10-15 PROCEDURE — 74011250636 HC RX REV CODE- 250/636: Performed by: INTERNAL MEDICINE

## 2020-10-15 PROCEDURE — 74011636637 HC RX REV CODE- 636/637: Performed by: INTERNAL MEDICINE

## 2020-10-15 PROCEDURE — 36415 COLL VENOUS BLD VENIPUNCTURE: CPT

## 2020-10-15 PROCEDURE — 65660000000 HC RM CCU STEPDOWN

## 2020-10-15 PROCEDURE — 99255 IP/OBS CONSLTJ NEW/EST HI 80: CPT | Performed by: INTERNAL MEDICINE

## 2020-10-15 PROCEDURE — 80202 ASSAY OF VANCOMYCIN: CPT

## 2020-10-15 PROCEDURE — 82962 GLUCOSE BLOOD TEST: CPT

## 2020-10-15 PROCEDURE — 82565 ASSAY OF CREATININE: CPT

## 2020-10-15 RX ADMIN — Medication 10 ML: at 04:56

## 2020-10-15 RX ADMIN — INSULIN LISPRO 5 UNITS: 100 INJECTION, SOLUTION INTRAVENOUS; SUBCUTANEOUS at 17:23

## 2020-10-15 RX ADMIN — CARVEDILOL 25 MG: 12.5 TABLET, FILM COATED ORAL at 17:24

## 2020-10-15 RX ADMIN — Medication 10 ML: at 21:28

## 2020-10-15 RX ADMIN — ENOXAPARIN SODIUM 40 MG: 40 INJECTION SUBCUTANEOUS at 21:27

## 2020-10-15 RX ADMIN — DULOXETINE HYDROCHLORIDE 120 MG: 30 CAPSULE, DELAYED RELEASE ORAL at 21:27

## 2020-10-15 RX ADMIN — ROSUVASTATIN CALCIUM 10 MG: 10 TABLET, COATED ORAL at 21:27

## 2020-10-15 RX ADMIN — OXYCODONE 5 MG: 5 TABLET ORAL at 07:29

## 2020-10-15 RX ADMIN — SACUBITRIL AND VALSARTAN 1 TABLET: 49; 51 TABLET, FILM COATED ORAL at 08:33

## 2020-10-15 RX ADMIN — BUMETANIDE 1 MG: 1 TABLET ORAL at 08:23

## 2020-10-15 RX ADMIN — INSULIN LISPRO 2 UNITS: 100 INJECTION, SOLUTION INTRAVENOUS; SUBCUTANEOUS at 11:52

## 2020-10-15 RX ADMIN — VANCOMYCIN HYDROCHLORIDE 1000 MG: 1 INJECTION, POWDER, LYOPHILIZED, FOR SOLUTION INTRAVENOUS at 20:00

## 2020-10-15 RX ADMIN — OXYCODONE 5 MG: 5 TABLET ORAL at 19:43

## 2020-10-15 RX ADMIN — CARVEDILOL 25 MG: 12.5 TABLET, FILM COATED ORAL at 08:23

## 2020-10-15 RX ADMIN — SACUBITRIL AND VALSARTAN 1 TABLET: 49; 51 TABLET, FILM COATED ORAL at 21:34

## 2020-10-15 RX ADMIN — INSULIN LISPRO 4 UNITS: 100 INJECTION, SOLUTION INTRAVENOUS; SUBCUTANEOUS at 21:27

## 2020-10-15 RX ADMIN — INSULIN GLARGINE 35 UNITS: 100 INJECTION, SOLUTION SUBCUTANEOUS at 08:23

## 2020-10-15 RX ADMIN — VANCOMYCIN HYDROCHLORIDE 1000 MG: 1 INJECTION, POWDER, LYOPHILIZED, FOR SOLUTION INTRAVENOUS at 08:23

## 2020-10-15 RX ADMIN — DOCUSATE SODIUM 50MG AND SENNOSIDES 8.6MG 1 TABLET: 8.6; 5 TABLET, FILM COATED ORAL at 08:24

## 2020-10-15 RX ADMIN — ROPINIROLE HYDROCHLORIDE 4 MG: 1 TABLET, FILM COATED ORAL at 21:27

## 2020-10-15 NOTE — PROGRESS NOTES
Orthopaedic Progress Note  Post Op day: 2 Days Post-Op    October 15, 2020 11:32 AM     Patient: Emre Dennis MRN: [id number]  SSN: [id number]    YOB: 1955  Age: 60 y.o. Sex: male      Admit date:  10/11/2020  Date of Surgery:  10/13/2020   Procedures:  Procedure(s):  INCISION AND DRAINAGE RIGHT HAND  Admitting Physician:  Tamara Wesley MD   Surgeon:  Yolie Young) and Role:     Anita Nice MD - Primary    Consulting Physician(s): Treatment Team: Attending Provider: Roger Saravia MD; Consulting Provider: Miranda Powell MD; Consulting Provider: Mario De Souza MD; Care Manager: Albert Jang; Utilization Review: Claudell Runner, RN; Wound/Ostomy Nurse: Christo France RN; Consulting Provider: Yessy Pisano DO    SUBJECTIVE:     Emre Dennis is a 60 y.o. male is 2 Days Post-Op s/p Procedure(s):  INCISION AND DRAINAGE RIGHT HAND with an appropriate level of post-operative pain. No complaints of nausea, vomiting, dizziness, lightheadedness, chest pain, or shortness of breath. OBJECTIVE:       Physical Exam:  General: Alert, cooperative, no distress. Respiratory: Respirations unlabored  Neurological:  Neurovascular exam within normal limits. Motor: + DF/PF. Musculoskeletal: Calves soft, supple, non-tender upon palpation. Dressing/Wound:  Clean, dry and intact. Packing removed. There is no worsening swelling. There is still palmar erythema. 5th digit swelling is reduced and not held in flexion. Dorsal hand without edema today.             Vital Signs:        Patient Vitals for the past 8 hrs:   BP Temp Pulse Resp SpO2 Height   10/15/20 1049      6' (1.829 m)   10/15/20 0757 124/69 97.7 °F (36.5 °C) 64 16 96 %    10/15/20 0700   62      10/15/20 0341 (!) 141/67 98.1 °F (36.7 °C) 63 16 96 %                                           Temp (24hrs), Av.5 °F (36.9 °C), Min:97.7 °F (36.5 °C), Max:99.9 °F (37.7 °C)      Labs:        Recent Labs 10/14/20  0724   HCT 38.9   HGB 13.1     Lab Results   Component Value Date/Time    Sodium 138 10/14/2020 07:24 AM    Potassium 3.6 10/14/2020 07:24 AM    Chloride 106 10/14/2020 07:24 AM    CO2 26 10/14/2020 07:24 AM    Glucose 100 10/14/2020 07:24 AM    BUN 20 10/14/2020 07:24 AM    Creatinine 0.95 10/15/2020 05:25 AM    Calcium 8.2 (L) 10/14/2020 07:24 AM       PT/OT:                Patient mobility  Bed Mobility Training  Rolling: Independent  Supine to Sit: Independent  Sit to Supine: Independent  Scooting: Independent  Transfer Training  Sit to Stand: Independent  Stand to Sit: Independent  Bed to Chair: Modified independent                   ASSESSMENT / PLAN:   Active Problems:    Type II diabetes mellitus ([facility] (7/2/2014)      HTN (hypertension) (7/2/2014)      RLS (restless legs syndrome) (7/2/2014)      Psychiatric disorder ()      Overview: depression and anxiety      Unspecified sleep apnea ()      Overview: lost weight      CKD (chronic kidney disease) stage 3, GFR 30-59 ml/min ()      HFrEF (heart failure with reduced ejection fraction) ([facility] (10/11/2020)      Cellulitis and abscess of hand (10/11/2020)       A: 1. S/P right hand I and D for abscess  2. S/P right 5th finger flexor tendon I and D.    P: 1. Changed packing today and redressed wound. Continue with daily wound care. 2. Would not discharge until final cultures are back. Patient reports history of MRSA?, but not in chart. Pencillin allergy. Dr. Clinton Harding to make final recommendations for antibiotics. No bone involvement. 3. Will need outpatient followup with hand therapy with [facility] for wound care. Daily wound packing and soaks. 4. CM to set up for home packing or nursing can teach.             Signed By:  Bailey Mccabe, [address] [place] [address]

## 2020-10-15 NOTE — PROGRESS NOTES
[facility] [address] [address]  [address] [place] [address]  [phone]      Medical Progress Note      NAME: Curtis Danielle   :  1955  MRM:  [id number]    Date/Time of service: 10/15/2020  2:22 PM       Subjective:     Chief Complaint:  Patient was personally seen and examined by me during this time period. Chart reviewed. Doing well, no complaints       Objective:       Vitals:       Last 24hrs VS reviewed since prior progress note.  Most recent are:    Visit Vitals  /66 (BP 1 Location: Left arm, BP Patient Position: At rest)   Pulse 65   Temp 97.5 °F (36.4 °C)   Resp 16   Ht 6' (1.829 m)   Wt 107 kg (236 lb)   SpO2 96%   BMI 32.01 kg/m²     SpO2 Readings from Last 6 Encounters:   10/15/20 96%   14 93%    O2 Flow Rate (L/min): 3 l/min       Intake/Output Summary (Last 24 hours) at 10/15/2020 1422  Last data filed at 10/15/2020 0820  Gross per 24 hour   Intake 480 ml   Output    Net 480 ml        Exam:     Physical Exam:    Gen:  Well-developed, well-nourished, obese, in no acute distress  HEENT:  Pink conjunctivae, PERRL, hearing intact to voice, moist mucous membranes  Neck:  Supple, without masses, thyroid non-tender  Resp:  No accessory muscle use, clear breath sounds without wheezes rales or rhonchi  Card:  No murmurs, normal S1, S2 without thrills, bruits or peripheral edema  Abd:  Soft, non-tender, non-distended, normoactive bowel sounds are present  Musc:  No cyanosis or clubbing  Skin:  R hand dressing c/d/i  Neuro:  Cranial nerves 3-12 are grossly intact, follows commands appropriately  Psych:  Good insight, oriented to person, place and time, alert    Medications Reviewed: (see below)    Lab Data Reviewed: (see below)    ______________________________________________________________________    Medications:     Current Facility-Administered Medications   Medication Dose Route Frequency    Vancomycin trough 10/15 prior to  dose   Other ONCE    vancomycin (VANCOCIN) 1,000 mg in 0.9% sodium chloride (MBP/ADV) 250 mL  1,000 mg IntraVENous Q12H    oxyCODONE IR (ROXICODONE) tablet 5 mg  5 mg Oral Q4H PRN    HYDROmorphone (PF) (DILAUDID) injection 1 mg  1 mg IntraVENous Q6H PRN    senna-docusate (PERICOLACE) 8.6-50 mg per tablet 1 Tab  1 Tab Oral DAILY    insulin glargine (LANTUS) injection 35 Units  35 Units SubCUTAneous DAILY    bumetanide (BUMEX) tablet 1 mg  1 mg Oral DAILY    sodium chloride (NS) flush 5-40 mL  5-40 mL IntraVENous Q8H    sodium chloride (NS) flush 5-40 mL  5-40 mL IntraVENous PRN    acetaminophen (TYLENOL) tablet 650 mg  650 mg Oral Q6H PRN    Or    acetaminophen (TYLENOL) suppository 650 mg  650 mg Rectal Q6H PRN    polyethylene glycol (MIRALAX) packet 17 g  17 g Oral DAILY PRN    insulin lispro (HUMALOG) injection   SubCUTAneous QID WITH MEALS    glucose chewable tablet 16 g  4 Tab Oral PRN    dextrose (D50W) injection syrg 12.5-25 g  12.5-25 g IntraVENous PRN    glucagon (GLUCAGEN) injection 1 mg  1 mg IntraMUSCular PRN    sacubitriL-valsartan (ENTRESTO) 49-51 mg tablet 1 Tab  1 Tab Oral Q12H    enoxaparin (LOVENOX) injection 40 mg  40 mg SubCUTAneous Q24H    rOPINIRole (REQUIP) tablet 4 mg  4 mg Oral QHS    rosuvastatin (CRESTOR) tablet 10 mg  10 mg Oral DAILY    DULoxetine (CYMBALTA) capsule 120 mg  120 mg Oral DAILY    carvediloL (COREG) tablet 25 mg  25 mg Oral BID WITH MEALS          Lab Review:     Recent Labs     10/14/20  0724   WBC 5.5   HGB 13.1   HCT 38.9        Recent Labs     10/15/20  0525 10/14/20  0724 10/13/20  0521   NA  --  138 137   K  --  3.6 3.9   CL  --  106 107   CO2  --  26 24   GLU  --  100 142*   BUN  --  20 20   CREA 0.95 1.03 1.04   CA  --  8.2* 8.2*   MG  --  2.0 2.1   PHOS  --  2.3* 2.4*     Lab Results   Component Value Date/Time    Glucose (POC) 186 (H) 10/15/2020 11:34 AM    Glucose (POC) 102 (H) 10/15/2020 06:45 AM    Glucose (POC) 168 (H) 10/14/2020 09:23 PM    Glucose (POC) 108 (H) 10/14/2020 04:44 PM    Glucose (POC) 180 (H) 10/14/2020 11:29 AM          Assessment / Plan:     59 yo hx of HTN, DM, sCHF EF 15-20%, CKD 3, presented w/ R hand cellulitis    1) R hand cellulitis/abscess: improving, not septic. Due to insect bite or nail puncture. Hx of multiple MRSA skin infection. S/p I&D by ortho on 10/13. Cx growing staph epi, but surgical cx pending. Empirically on IV Vanc. Use IV dilaudid prn severe pain. Cont PT/OT, wound care. ID and ortho following    2) Chronic systolic CHF: prior EF 63-47% s/p ICD, but repeat echo with EF 50%. Follows by Dr. Geovany Amato. Volume stable. Cont entresto, coreg, bumex, statin. Cards following    3) HTN: cont coreg, entresto    4) DM type 2 w/ renal complications: U4G 8.8%. Cont Lantus, SSI    5) CKD 3: Cr at baseline.   Will monitor     6) Depression/anxiety: cont cymbalta    Total time spent with patient: 20 min **I personally saw and examined the patient during this time period**                 Care Plan discussed with: Patient, nursing    Discussed:  Care Plan    Prophylaxis:  Lovenox    Disposition:  Home w/Family tomorrow            ___________________________________________________    Attending Physician: Arvin Mendes MD

## 2020-10-15 NOTE — PROGRESS NOTES
10/15/20   CARE MANAGEMENT NOTE:  CM reviewed EMR for clinical updates. Pt was admitted with right hand cellulitis/abscess. Pt is s/p I&D on 10/13. Reportedly, pt resides with his wife Leann Jacob ([phone]).    RUR 14%     Transition Plan of Care:  Monica Arteaga is to return home without any anticipated post discharge needs at this writing. 2.  Outpt f/u  3.  Pt will arrange his own transportation home.     CM will continue to follow pt until discharged.   Stew

## 2020-10-15 NOTE — PROGRESS NOTES
Problem: Falls - Risk of  Goal: *Absence of Falls  Description: Document John Chao Fall Risk and appropriate interventions in the flowsheet. Outcome: Progressing Towards Goal  Note: Fall Risk Interventions:            Medication Interventions: Evaluate medications/consider consulting pharmacy, Patient to call before getting OOB, Teach patient to arise slowly                   Problem: Patient Education: Go to Patient Education Activity  Goal: Patient/Family Education  Outcome: Progressing Towards Goal     Problem: Diabetes Self-Management  Goal: *Disease process and treatment process  Description: Define diabetes and identify own type of diabetes; list 3 options for treating diabetes. Outcome: Progressing Towards Goal  Goal: *Incorporating nutritional management into lifestyle  Description: Describe effect of type, amount and timing of food on blood glucose; list 3 methods for planning meals. Outcome: Progressing Towards Goal  Goal: *Incorporating physical activity into lifestyle  Description: State effect of exercise on blood glucose levels. Outcome: Progressing Towards Goal  Goal: *Developing strategies to promote health/change behavior  Description: Define the ABC's of diabetes; identify appropriate screenings, schedule and personal plan for screenings. Outcome: Progressing Towards Goal  Goal: *Using medications safely  Description: State effect of diabetes medications on diabetes; name diabetes medication taking, action and side effects. Outcome: Progressing Towards Goal  Goal: *Monitoring blood glucose, interpreting and using results  Description: Identify recommended blood glucose targets  and personal targets. Outcome: Progressing Towards Goal  Goal: *Prevention, detection, treatment of acute complications  Description: List symptoms of hyper- and hypoglycemia; describe how to treat low blood sugar and actions for lowering  high blood glucose level.   Outcome: Progressing Towards Goal  Goal: *Prevention, detection and treatment of chronic complications  Description: Define the natural course of diabetes and describe the relationship of blood glucose levels to long term complications of diabetes.   Outcome: Progressing Towards Goal  Goal: *Developing strategies to address psychosocial issues  Description: Describe feelings about living with diabetes; identify support needed and support network  Outcome: Progressing Towards Goal  Goal: *Sick day guidelines  Outcome: Progressing Towards Goal     Problem: Patient Education: Go to Patient Education Activity  Goal: Patient/Family Education  Outcome: Progressing Towards Goal     Problem: Cellulitis Care Plan (Adult)  Goal: *Control of acute pain  Outcome: Progressing Towards Goal  Goal: *Skin integrity maintained  Outcome: Progressing Towards Goal  Goal: *Absence of infection signs and symptoms  Outcome: Progressing Towards Goal     Problem: Patient Education: Go to Patient Education Activity  Goal: Patient/Family Education  Outcome: Progressing Towards Goal

## 2020-10-15 NOTE — CONSULTS
Infectious Disease Consult    Impression/Plan   · Right hand cellulitis following puncture wound. Status post I&D 10/13/2020. Operative cultures are pending. Unfortunately patient's antibiotic options are limited by penicillin allergy and drug drug interactions. Continue vancomycin. Await operative cultures. Anticipate discharge on oral antibiotic regimen  · Penicillin allergy. The patient is able to tolerate cephalosporin antibiotics  · CKD. Stable  · Diabetes mellitus. This is poorly controlled. Hemoglobin A1c greater than 9  · Depression/anxiety. On Cymbalta        Anti-infectives:   1. Vancomycin    Subjective:   Date of Consultation:  October 15, 2020  Date of Admission: 10/11/2020   Referring Physician:     Josef Branham is a 60 y.o. male  with a past medical history significant for diabetes mellitus, CKD, penicillin allergy who is being seen for right volar hand infection. He presented to the [facility] yesterday complaining of worsening erythema of the right volar hand after a potential spider bite verus foreign body 10 days prior to admission. Patient apparently was helping a friend move some yard debris  10 days ago when his right hand got punctured by something. Patient does not know it is was a nail, but said he saw a spider just after the injury  He reports worsening erythema that occurred within 24 hours of the injury. He was seen by an outpatient clinic and had IM ceftriaxone x 2, bactrim and then doxycycline. There was no improvement in his hand erythema or function. Patient underwent superficial I and D by ER attending at the time of admission. Cultures are growing coag negative staph. He underwent formal incision and drainage by orthopedic surgery on 10/13. Operative cultures are pending. He is currently on vancomycin.   The infectious diseases service has been asked to assist with antibiotic management    Patient Active Problem List   Diagnosis Code    Cellulitis of scalp B84.267    Type II diabetes mellitus ([facility] E11.9    HTN (hypertension) I10    RLS (restless legs syndrome) G25.81    Diabetic foot ulcer ([facility]) E11.621, L97.509    Psychiatric disorder F99    Unspecified sleep apnea G47.30    CKD (chronic kidney disease) stage 3, GFR 30-59 ml/min N18.30    HFrEF (heart failure with reduced ejection fraction) ([facility]) I50.20    Cellulitis and abscess of hand L03.119, L02.519     Past Medical History:   Diagnosis Date    Diabetes ([facility]     Type 2    Diabetic foot ulcer ([facility] 7/2/2014    Foot ulcer due to secondary DM ([facility]     Hypertension     Psychiatric disorder     depression and anxiety    Restless leg syndrome     Streptococcal bacteremia     elbow    Type II or unspecified type diabetes mellitus without mention of complication, uncontrolled 7/2/2014    Unspecified sleep apnea     lost weight      History reviewed. No pertinent family history. Social History     Tobacco Use    Smoking status: Current Some Day Smoker     Packs/day: 0.25     Years: 20.00     Pack years: 5.00    Smokeless tobacco: Never Used    Tobacco comment: cigars   Substance Use Topics    Alcohol use: Yes     Alcohol/week: 3.3 standard drinks     Types: 4 Cans of beer per week     Past Surgical History:   Procedure Laterality Date    CARDIAC SURG PROCEDURE UNLIST      HX ORTHOPAEDIC      bone spur right foot.  HX PACEMAKER      AICD    HX UROLOGICAL      hydrocele repair left testicle    HX WISDOM TEETH EXTRACTION        Prior to Admission medications    Medication Sig Start Date End Date Taking? Authorizing Provider   DULoxetine (Cymbalta) 60 mg capsule Take 120 mg by mouth daily. Yes Provider, Historical   insulin degludec Sissy Santillan FlexTouch U-100) 100 unit/mL (3 mL) inpn 48 Units by SubCUTAneous route daily. Yes Provider, Historical   empagliflozin (Jardiance) 25 mg tablet Take 25 mg by mouth daily.    Yes Provider, Historical   sacubitriL-valsartan (Entresto) 49-51 mg tab tablet Take 1 Tab by mouth two (2) times a day. Yes Provider, Historical   carvedilol (COREG PO) Take 25 mg by mouth two (2) times a day. Yes Provider, Historical   bumetanide (BUMEX) 1 mg tablet Take 1 mg by mouth daily. Yes Provider, Historical   rosuvastatin (CRESTOR) 10 mg tablet Take 10 mg by mouth daily. Yes Provider, Historical   trimethoprim-sulfamethoxazole (Bactrim DS) 160-800 mg per tablet Take 1 Tab by mouth two (2) times a day. 10/6/20  Yes Provider, Historical   doxycycline (ADOXA) 100 mg tablet Take 100 mg by mouth two (2) times a day. 10/8/20  Yes Provider, Historical   rOPINIRole (Requip) 1 mg tablet Take 4 mg by mouth nightly. Yes Provider, Historical     Allergies   Allergen Reactions    Penicillins Hives        Review of Systems:  A comprehensive review of systems was negative except for that written in the History of Present Illness. Objective:   Blood pressure 124/69, pulse 64, temperature 97.7 °F (36.5 °C), resp. rate 16, height 6' (1.829 m), weight 236 lb (107 kg), SpO2 96 %. Temp (24hrs), Av.5 °F (36.9 °C), Min:97.7 °F (36.5 °C), Max:99.9 °F (37.7 °C)       Exam:    General:  Alert, cooperative, well noursished, well developed, appears stated age   Eyes:  Sclera anicteric   Mouth/Throat: Mucous membranes moist   Neck: Supple   Lungs:    No distress   CV:     Abdomen:    Nondistended   Extremities:  No edema.   Right hand dressed   Skin:  No rash   Lymph nodes:    Musculoskeletal: No swelling or deformity   Lines/Devices:   Peripheral   Psych: Alert and oriented, normal mood affect given the setting       Data Review:   Recent Results (from the past 24 hour(s))   GLUCOSE, POC    Collection Time: 10/14/20 11:29 AM   Result Value Ref Range    Glucose (POC) 180 (H) 65 - 100 mg/dL    Performed by [facility] ([facility])    GLUCOSE, POC    Collection Time: 10/14/20  4:44 PM   Result Value Ref Range    Glucose (POC) 108 (H) 65 - 100 mg/dL    Performed by [facility] ([facility]) GLUCOSE, POC    Collection Time: 10/14/20  9:23 PM   Result Value Ref Range    Glucose (POC) 168 (H) 65 - 100 mg/dL    Performed by Angelo Tovar ([facility])    CREATININE    Collection Time: 10/15/20  5:25 AM   Result Value Ref Range    Creatinine 0.95 0.70 - 1.30 MG/DL    GFR est AA >60 >60 ml/min/1.73m2    GFR est non-AA >60 >60 ml/min/1.73m2   GLUCOSE, POC    Collection Time: 10/15/20  6:45 AM   Result Value Ref Range    Glucose (POC) 102 (H) 65 - 100 mg/dL    Performed by Tiesha Guzman         Microbiology:      Studies:      Signed By: Anna Cavanaugh DO     October 15, 2020

## 2020-10-15 NOTE — ROUTINE PROCESS
Bedside shift change report given to Arnoldo RN (oncoming nurse) by Cooper Rodas RN (offgoing nurse). Report included the following information SBAR, Kardex, Intake/Output, MAR and Accordion.

## 2020-10-15 NOTE — CONSULTS
Comprehensive Nutrition Assessment    Type and Reason for Visit: Initial, Consult    Nutrition Recommendations/Plan:   1. Continue consistent carb diet. 2. CCD educational materials provided. Nutrition Assessment:      10/15: 61 y/o male admitted with cellulitis and abscess of hand. S/p I&D 10/13. PMH includes DM, CKD, HTN, CHF. BMI 32, c/w class I obesity. Pt reports okay appetite. Says he has been eating a little less since admission d/t not liking all the food. Breakfast tray in room, 100% muffin and cereal and 90% eggs eaten. Recorded intake of 80% breakfast yesterday. Says he was eating well PTA, 3 meals/day. Usual intake: B- breakfast biscuit; L- wrap or salad; D- something \"simple;\" occasional snacks- fruit or protein bar. Says he can't really cook anymore d/t his hand problems. Now is he having issues with cutting foods and opening containers. Says he has had some intentional weight loss- weighed 260# about a year ago. #. A1c 9.3, c/w poorly-managed DM. Says he used to not check his BG frequently enough but now he has a CGM that he will set up when he gets home. Provided pt with consistent carb diet education. Pt says he has talked to nutritionists before and knows what he needs to do he just needs to find the \"willpower. \" Discussed importance of adequate protein and well-managed BG for healing. Pt receptive. Declines ONS at this time as he doesn't care for them. Pt has no nutrition-related questions at this time. Labs- phos 2.3, A1c 9.3, -168-108. Meds- Bumex, insulin, vanco.     Intakes:  Patient Vitals for the past 168 hrs:   % Diet Eaten   10/14/20 0825 80 %     Weight hx:   Wt Readings from Last 10 Encounters:   10/13/20 107 kg (236 lb)   07/01/14 108.9 kg (240 lb)     Malnutrition Assessment:  Malnutrition Status: none    Estimated Daily Nutrient Needs:  Energy (kcal):  2467  Protein (g):  107(1-1.2 g/kg)       Fluid (ml/day):  2467(1mL/kcal)    Nutrition Related Findings:  LBM 10/10 Wounds:    Surgical wound(hand abscess/incision)       Current Nutrition Therapies:  DIET DIABETIC CONSISTENT CARB Regular    Anthropometric Measures:  · Height:  6' (182.9 cm)  · Current Body Wt:  107 kg (236 lb)   · Admission Body Wt:       · Usual Body Wt:        · Ideal Body Wt:  178 lbs:  132.6 %   · BMI Category:  Obese class 1 (BMI 30.0-34. 9)       Nutrition Diagnosis:   · Altered nutrition-related lab values related to endocrine dysfunction as evidenced by lab values(A1c 9.3, -168-108)      Nutrition Interventions:   Food and/or Nutrient Delivery: Continue current diet  Nutrition Education and Counseling: Education initiated  Coordination of Nutrition Care: Continued inpatient monitoring    Goals:  PO intake >75% meals with BG <160mg/dL next 5-7 days       Nutrition Monitoring and Evaluation:   Food/Nutrient Intake Outcomes: Food and nutrient intake  Physical Signs/Symptoms Outcomes: Weight, Biochemical data    Discharge Planning:    Continue current diet     Electronically signed by Ethan Keita RDN on 10/15/2020 at 10:54 AM    Contact: [phone]

## 2020-10-15 NOTE — PROGRESS NOTES
2060; Bedside shift change report given to Emily (oncoming nurse) by Tho Leon (offgoing nurse). Report included the following information SBAR, Kardex, Intake/Output and Cardiac Rhythm NSR.

## 2020-10-16 VITALS
WEIGHT: 236 LBS | SYSTOLIC BLOOD PRESSURE: 127 MMHG | BODY MASS INDEX: 31.97 KG/M2 | RESPIRATION RATE: 18 BRPM | HEART RATE: 59 BPM | DIASTOLIC BLOOD PRESSURE: 70 MMHG | TEMPERATURE: 97.7 F | OXYGEN SATURATION: 95 % | HEIGHT: 72 IN

## 2020-10-16 LAB
BACTERIA SPEC CULT: NORMAL
CREAT SERPL-MCNC: 0.99 MG/DL (ref 0.7–1.3)
GLUCOSE BLD STRIP.AUTO-MCNC: 193 MG/DL (ref 65–100)
GLUCOSE BLD STRIP.AUTO-MCNC: 237 MG/DL (ref 65–100)
SERVICE CMNT-IMP: ABNORMAL
SERVICE CMNT-IMP: ABNORMAL
SERVICE CMNT-IMP: NORMAL

## 2020-10-16 PROCEDURE — 36415 COLL VENOUS BLD VENIPUNCTURE: CPT

## 2020-10-16 PROCEDURE — 74011636637 HC RX REV CODE- 636/637: Performed by: INTERNAL MEDICINE

## 2020-10-16 PROCEDURE — 99232 SBSQ HOSP IP/OBS MODERATE 35: CPT | Performed by: INTERNAL MEDICINE

## 2020-10-16 PROCEDURE — 74011250637 HC RX REV CODE- 250/637: Performed by: INTERNAL MEDICINE

## 2020-10-16 PROCEDURE — 82962 GLUCOSE BLOOD TEST: CPT

## 2020-10-16 PROCEDURE — 74011250636 HC RX REV CODE- 250/636: Performed by: INTERNAL MEDICINE

## 2020-10-16 PROCEDURE — 82565 ASSAY OF CREATININE: CPT

## 2020-10-16 RX ORDER — AMOXICILLIN 250 MG
1 CAPSULE ORAL DAILY
Qty: 30 TAB | Refills: 0 | Status: SHIPPED | OUTPATIENT
Start: 2020-10-17 | End: 2021-04-06

## 2020-10-16 RX ORDER — CEFDINIR 300 MG/1
300 CAPSULE ORAL 2 TIMES DAILY
Qty: 20 CAP | Refills: 0 | Status: SHIPPED | OUTPATIENT
Start: 2020-10-16 | End: 2020-10-26

## 2020-10-16 RX ORDER — OXYCODONE HYDROCHLORIDE 5 MG/1
5 TABLET ORAL
Qty: 15 TAB | Refills: 0 | Status: SHIPPED | OUTPATIENT
Start: 2020-10-16 | End: 2020-10-21

## 2020-10-16 RX ORDER — DOXYCYCLINE 100 MG/1
100 TABLET ORAL 2 TIMES DAILY
Qty: 20 TAB | Refills: 0 | Status: SHIPPED | OUTPATIENT
Start: 2020-10-16 | End: 2020-10-26

## 2020-10-16 RX ADMIN — SACUBITRIL AND VALSARTAN 1 TABLET: 49; 51 TABLET, FILM COATED ORAL at 08:36

## 2020-10-16 RX ADMIN — INSULIN GLARGINE 35 UNITS: 100 INJECTION, SOLUTION SUBCUTANEOUS at 08:29

## 2020-10-16 RX ADMIN — INSULIN LISPRO 3 UNITS: 100 INJECTION, SOLUTION INTRAVENOUS; SUBCUTANEOUS at 12:14

## 2020-10-16 RX ADMIN — Medication 10 ML: at 14:31

## 2020-10-16 RX ADMIN — CARVEDILOL 25 MG: 12.5 TABLET, FILM COATED ORAL at 08:30

## 2020-10-16 RX ADMIN — Medication 10 ML: at 05:09

## 2020-10-16 RX ADMIN — BUMETANIDE 1 MG: 1 TABLET ORAL at 08:30

## 2020-10-16 RX ADMIN — INSULIN LISPRO 2 UNITS: 100 INJECTION, SOLUTION INTRAVENOUS; SUBCUTANEOUS at 08:29

## 2020-10-16 RX ADMIN — DOCUSATE SODIUM 50MG AND SENNOSIDES 8.6MG 1 TABLET: 8.6; 5 TABLET, FILM COATED ORAL at 08:30

## 2020-10-16 RX ADMIN — VANCOMYCIN HYDROCHLORIDE 750 MG: 750 INJECTION, POWDER, LYOPHILIZED, FOR SOLUTION INTRAVENOUS at 10:36

## 2020-10-16 NOTE — PROGRESS NOTES
Pharmacist Discharge Medication Reconciliation    Discharge Provider:  Dr. Anders Wood MD     Discharge Medications:      My Medications        START taking these medications        Instructions Each Dose to Equal Morning Noon Evening Bedtime   cefdinir 300 mg capsule  Commonly known as:  OMNICEF      Take 1 Cap by mouth two (2) times a day for 10 days. 300 mg         L.acidoph & parac-S.therm-Bifido 16 billion cell Cap cap  Commonly known as:  BRIAN Q2/RISAQUAD-2      Take 1 Cap by mouth daily for 30 days. 1 Cap         oxyCODONE IR 5 mg immediate release tablet  Commonly known as:  ROXICODONE      Take 1 Tab by mouth every six (6) hours as needed for Pain for up to 5 days. Max Daily Amount: 20 mg. Indications: pain   5 mg         senna-docusate 8.6-50 mg per tablet  Commonly known as:  PERICOLACE  Start taking on:  October 17, 2020      Take 1 Tab by mouth daily. 1 Tab                CONTINUE taking these medications        Instructions Each Dose to Equal Morning Noon Evening Bedtime   bumetanide 1 mg tablet  Commonly known as:  BUMEX      Take 1 mg by mouth daily. 1 mg         COREG PO      Take 25 mg by mouth two (2) times a day. 25 mg         Cymbalta 60 mg capsule  Generic drug:  DULoxetine      Take 120 mg by mouth daily. 120 mg         doxycycline 100 mg tablet  Commonly known as:  ADOXA      Take 1 Tab by mouth two (2) times a day for 10 days. 100 mg         Entresto 49-51 mg Tab tablet  Generic drug:  sacubitriL-valsartan      Take 1 Tab by mouth two (2) times a day. 1 Tab         Jardiance 25 mg tablet  Generic drug:  empagliflozin      Take 25 mg by mouth daily. 25 mg         Requip 1 mg tablet  Generic drug:  rOPINIRole      Take 4 mg by mouth nightly. 4 mg         rosuvastatin 10 mg tablet  Commonly known as:  CRESTOR      Take 10 mg by mouth daily. 10 mg         Tresiba FlexTouch U-100 100 unit/mL (3 mL) Inpn  Generic drug:  insulin degludec      48 Units by SubCUTAneous route daily. 48 Units                STOP taking these medications      Bactrim -800 mg per tablet  Generic drug:  trimethoprim-sulfamethoxazole                  Where to Get Your Medications        Information on where to get these meds will be given to you by the nurse or doctor.     Ask your nurse or doctor about these medications  cefdinir 300 mg capsule  doxycycline 100 mg tablet  L.acidoph & parac-S.therm-Bifido 16 billion cell Cap cap  oxyCODONE IR 5 mg immediate release tablet  senna-docusate 8.6-50 mg per tablet         The patient's chart, MAR, and AVS were reviewed by   Ab Lee,   Contact: [phone]

## 2020-10-16 NOTE — PROGRESS NOTES
0840: Bedside shift change report given to VICKI roman (oncoming nurse) by Kandi Lock RN (offgoing nurse). Report included the following information SBAR, Kardex, Intake/Output, Recent Results and Cardiac Rhythm NSR.

## 2020-10-16 NOTE — PROGRESS NOTES
Problem: Falls - Risk of  Goal: *Absence of Falls  Description: Document Pam Patel Fall Risk and appropriate interventions in the flowsheet. Outcome: Progressing Towards Goal  Note: Fall Risk Interventions:            Medication Interventions: Teach patient to arise slowly, Evaluate medications/consider consulting pharmacy, Patient to call before getting OOB                   Problem: Patient Education: Go to Patient Education Activity  Goal: Patient/Family Education  Outcome: Progressing Towards Goal     Problem: Diabetes Self-Management  Goal: *Disease process and treatment process  Description: Define diabetes and identify own type of diabetes; list 3 options for treating diabetes. Outcome: Progressing Towards Goal  Goal: *Incorporating nutritional management into lifestyle  Description: Describe effect of type, amount and timing of food on blood glucose; list 3 methods for planning meals. Outcome: Progressing Towards Goal  Goal: *Incorporating physical activity into lifestyle  Description: State effect of exercise on blood glucose levels. Outcome: Progressing Towards Goal  Goal: *Developing strategies to promote health/change behavior  Description: Define the ABC's of diabetes; identify appropriate screenings, schedule and personal plan for screenings. Outcome: Progressing Towards Goal  Goal: *Using medications safely  Description: State effect of diabetes medications on diabetes; name diabetes medication taking, action and side effects. Outcome: Progressing Towards Goal  Goal: *Monitoring blood glucose, interpreting and using results  Description: Identify recommended blood glucose targets  and personal targets. Outcome: Progressing Towards Goal  Goal: *Prevention, detection, treatment of acute complications  Description: List symptoms of hyper- and hypoglycemia; describe how to treat low blood sugar and actions for lowering  high blood glucose level.   Outcome: Progressing Towards Goal  Goal: *Prevention, detection and treatment of chronic complications  Description: Define the natural course of diabetes and describe the relationship of blood glucose levels to long term complications of diabetes.   Outcome: Progressing Towards Goal  Goal: *Developing strategies to address psychosocial issues  Description: Describe feelings about living with diabetes; identify support needed and support network  Outcome: Progressing Towards Goal  Goal: *Sick day guidelines  Outcome: Progressing Towards Goal     Problem: Cellulitis Care Plan (Adult)  Goal: *Control of acute pain  Outcome: Progressing Towards Goal  Goal: *Skin integrity maintained  Outcome: Progressing Towards Goal  Goal: *Absence of infection signs and symptoms  Outcome: Progressing Towards Goal     Problem: Patient Education: Go to Patient Education Activity  Goal: Patient/Family Education  Outcome: Progressing Towards Goal

## 2020-10-16 NOTE — DISCHARGE INSTRUCTIONS
HOSPITALIST DISCHARGE INSTRUCTIONS  NAME: Donis Buchanan   :  1955   MRN:  [id number]     Date/Time:  10/16/2020 10:06 AM    ADMIT DATE: 10/11/2020     DISCHARGE DATE: 10/16/2020     ADMITTING DIAGNOSIS:  Cellulitis and abscess of right hand. Culture with staph Epi    DISCHARGE DIAGNOSIS:  same    MEDICATIONS:  See after visit summary       · It is important that you take the medication exactly as they are prescribed. · Keep your medication in the bottles provided by the pharmacist and keep a list of the medication names, dosages, and times to be taken in your wallet. · Do not take other medications without consulting your doctor     Pain Management: per above medications    What to do at Home    Recommended diet:  Diabetic Diet    Recommended activity: See surgical instructions    1) Return to the hospital if you feel worse    2) If you experience any of the following symptoms then please call your primary care physician or return to the emergency room if you cannot get hold of your doctor:  Fever, chills, nausea, vomiting, diarrhea, change in mentation, falling, bleeding, shortness of breath, chest pain, severe headache, severe abdominal pain,     3) Follow up with your doctors    4) Follow wound care instructions per Ortho    Follow Up: Follow-up Information     Follow up With Specialties Details Why Contact Info    Bard Amie MD [facility] Medicine Schedule an appointment as soon as possible for a visit in 2 weeks  [address] [place] [address]  [phone]      Deshawn Blackman MD Orthopedic Surgery Schedule an appointment as soon as possible for a visit in 1 week  [address] [place] [address]  [phone]              Information obtained by :  I understand that if any problems occur once I am at home I am to contact my physician. I understand and acknowledge receipt of the instructions indicated above. Physician's or R.N.'s Signature                                                                  Date/Time                                                                                                                                              Patient or Representative Signature                                                          Date/Time      Patient Education        Skin Abscess: Care Instructions  Your Care Instructions     A skin abscess is a bacterial infection that forms a pocket of pus. A boil is a kind of skin abscess. The doctor may have cut an opening in the abscess so that the pus can drain out. You may have gauze in the cut so that the abscess will stay open and keep draining. You may need antibiotics. You will need to follow up with your doctor to make sure the infection has gone away. The doctor has checked you carefully, but problems can develop later. If you notice any problems or new symptoms, get medical treatment right away. Follow-up care is a key part of your treatment and safety. Be sure to make and go to all appointments, and call your doctor if you are having problems. It's also a good idea to know your test results and keep a list of the medicines you take. How can you care for yourself at home? · Apply warm and dry compresses, a heating pad set on low, or a hot water bottle 3 or 4 times a day for pain. Keep a cloth between the heat source and your skin. · If your doctor prescribed antibiotics, take them as directed. Do not stop taking them just because you feel better. You need to take the full course of antibiotics. · Take pain medicines exactly as directed. ? If the doctor gave you a prescription medicine for pain, take it as prescribed. ? If you are not taking a prescription pain medicine, ask your doctor if you can take an over-the-counter medicine.   · Keep your bandage clean and dry. Change the bandage whenever it gets wet or dirty, or at least one time a day. · If the abscess was packed with gauze:  ? Keep follow-up appointments to have the gauze changed or removed. If the doctor instructed you to remove the gauze, follow the instructions you were given for how to remove it. ? After the gauze is removed, soak the area in warm water for 15 to 20 minutes 2 times a day, until the wound closes. When should you call for help? Call your doctor now or seek immediate medical care if:    · You have signs of worsening infection, such as:  ? Increased pain, swelling, warmth, or redness. ? Red streaks leading from the infected skin. ? Pus draining from the wound. ? A fever. Watch closely for changes in your health, and be sure to contact your doctor if:    · You do not get better as expected. Where can you learn more? Go to http://www.gray.com/  Enter A947 in the search box to learn more about \"Skin Abscess: Care Instructions. \"  Current as of: July 2, 2020               Content Version: 12.6  © 2842-1688 [employer]. Care instructions adapted under license by [employer] (which disclaims liability or warranty for this information). If you have questions about a medical condition or this instruction, always ask your healthcare professional. [address] any warranty or liability for your use of this information. ORTHO:    DC home per Hospitalist on PO abx per ID recs. Will need outpatient followup with hand therapy with [facility] for wound care.  Daily wound packing and soaks. Keep RUE elevated at all times.      F/u with CLAUS Sanchez Next week. [phone]     Thank you for allowing us to take part in this patients care.

## 2020-10-16 NOTE — PROGRESS NOTES
[address] [address] Pharmacy Dosing Services: Vancomycin trough evaluation: 10/15/20     Consult for antibiotic dosing of vancomycin by Dr. Anabell Agosto  Pharmacist reviewed antibiotic appropriateness for 60year old , male  for indication of spider bit cellulitis  Day of Therapy 5  S/p I&D 10/13/20  Tx failure OP doxycycline plus bactrim    Vancomycin trough: 19.5mcg/mL. Per RN trough actually drawn 5 minutes before Vancomycin hung and drawn timely. Level is supratherapeutic. Vibha Daniels Decrease dose to 750mg ivpb q12h. Pharmacy to continue to follow daily. Thank you,    Viktoria Rodriguez, Pharm D. Contact: [phone]  Date Dose & Interval Measured (mcg/mL) Extrapolated (mcg/mL)   ?10/11/20 ? 2gm x1 ? ?   ?10/12/20 ?1.5gm q12h ?19.2 ?   ?10/15/20 ? 1gm q12h ?19.5 ?       10/16/20    750mg q12h

## 2020-10-16 NOTE — PROGRESS NOTES
10/16/20   CARE MANAGEMENT NOTE:  CM reviewed EMR for clinical updates.  Pt was admitted with right hand cellulitis/abscess.  Pt is s/p I&D on 10/13.    Reportedly, pt resides with his wife Caren Darden ([phone]).    RUR 14%     Transition Plan of Care:  1.  Home health orders for skilled nursing wound care received and a referral was made to [facility] and they have accepted. 2.  Outpt f/u  3.  Pt's wife will transport pt home upon discharge.     No further post discharge needs identified at this writing.   Stew

## 2020-10-16 NOTE — PROGRESS NOTES
[facility] Infectious Disease Specialists Progress Note           Yuliana Painting DO    [phone] Office  [phone]  Fax    10/16/2020      Assessment & Plan:   ·   Right hand cellulitis following puncture wound. Status post I&D 10/13/2020. Operative cultures reveal no growth to date. Unfortunately patient's antibiotic options are limited by penicillin allergy and drug drug interactions. Plan discharge on cefdinir and doxycycline x10 days which will complete a total of 16 days of treatment. Discussed with orthopedic surgery. Patient to follow-up with them next week to ensure appropriate response to antibiotics  · Penicillin allergy. The patient is able to tolerate cephalosporin antibiotics  · CKD. Stable  · Diabetes mellitus. This is poorly controlled. Hemoglobin A1c greater than 9  · Depression/anxiety. On Cymbalta          Subjective:     No complaints. Anxious for discharge    Objective:     Vitals:   Visit Vitals  /70 (BP 1 Location: Left arm, BP Patient Position: At rest)   Pulse 60   Temp 98 °F (36.7 °C)   Resp 18   Ht 6' (1.829 m)   Wt 236 lb (107 kg)   SpO2 96%   BMI 32.01 kg/m²        Tmax:  Temp (24hrs), Av.3 °F (36.8 °C), Min:97.5 °F (36.4 °C), Max:98.8 °F (37.1 °C)      Exam:   Patient is intubated:  no    Physical Examination:   General:  Alert, cooperative, no distress   Head:  Normocephalic, atraumatic. Eyes:  Conjunctivae clear   Neck: Supple       Lungs:   No distress. Chest wall:     Heart:     Abdomen:    Nondistended   Extremities: Moves all. Hand dressed   Skin:  No rash   Neurologic: CNII-XII intact. Normal strength     Labs:        No lab exists for component: ITNL   No results for input(s): CPK, CKMB, TROIQ in the last 72 hours.   Recent Labs     10/16/20  0531 10/15/20  0525 10/14/20  0724   NA  --   --  138   K  --   --  3.6   CL  --   --  106   CO2  --   --  26   BUN  --   --  20   CREA 0.99 0.95 1.03   GLU  --   --  100   PHOS  --   --  2.3*   MG  --   -- 2. 0   WBC  --   --  5.5   HGB  --   --  13.1   HCT  --   --  38.9   PLT  --   --  256     No results for input(s): INR, PTP, APTT, INREXT in the last 72 hours. Needs: urine analysis, urine sodium, protein and creatinine  Lab Results   Component Value Date/Time    Sodium,urine random 78 07/11/2014 09:00 PM    Creatinine, urine 36.45 07/11/2014 09:00 PM         Cultures:     No results found for: SDES  Lab Results   Component Value Date/Time    Culture result: NO GROWTH 3 DAYS 10/13/2020 08:05 AM    Culture result: NO GROWTH 3 DAYS 10/13/2020 08:05 AM    Culture result: MRSA NOT PRESENT 10/12/2020 02:03 PM    Culture result:  10/12/2020 02:03 PM         Screening of patient nares for MRSA is for surveillance purposes and, if positive, to facilitate isolation considerations in high risk settings. It is not intended for automatic decolonization interventions per se as regimens are not sufficiently effective to warrant routine use.        Radiology:     Medications       Current Facility-Administered Medications   Medication Dose Route Frequency Last Dose    vancomycin (VANCOCIN) 750 mg in 0.9% sodium chloride (MBP/ADV) 250 mL  750 mg IntraVENous Q12H 750 mg at 10/16/20 1036    oxyCODONE IR (ROXICODONE) tablet 5 mg  5 mg Oral Q4H PRN 5 mg at 10/15/20 1943    HYDROmorphone (PF) (DILAUDID) injection 1 mg  1 mg IntraVENous Q6H PRN      senna-docusate (PERICOLACE) 8.6-50 mg per tablet 1 Tab  1 Tab Oral DAILY 1 Tab at 10/16/20 0830    insulin glargine (LANTUS) injection 35 Units  35 Units SubCUTAneous DAILY 35 Units at 10/16/20 0829    bumetanide (BUMEX) tablet 1 mg  1 mg Oral DAILY 1 mg at 10/16/20 0830    sodium chloride (NS) flush 5-40 mL  5-40 mL IntraVENous Q8H 10 mL at 10/16/20 0509    sodium chloride (NS) flush 5-40 mL  5-40 mL IntraVENous PRN      acetaminophen (TYLENOL) tablet 650 mg  650 mg Oral Q6H  mg at 10/12/20 9966    Or    acetaminophen (TYLENOL) suppository 650 mg  650 mg Rectal Q6H PRN      polyethylene glycol (MIRALAX) packet 17 g  17 g Oral DAILY PRN      insulin lispro (HUMALOG) injection   SubCUTAneous QID WITH MEALS 2 Units at 10/16/20 0829    glucose chewable tablet 16 g  4 Tab Oral PRN      dextrose (D50W) injection syrg 12.5-25 g  12.5-25 g IntraVENous PRN      glucagon (GLUCAGEN) injection 1 mg  1 mg IntraMUSCular PRN      sacubitriL-valsartan (ENTRESTO) 49-51 mg tablet 1 Tab  1 Tab Oral Q12H 1 Tab at 10/16/20 0836    enoxaparin (LOVENOX) injection 40 mg  40 mg SubCUTAneous Q24H 40 mg at 10/15/20 2127    rOPINIRole (REQUIP) tablet 4 mg  4 mg Oral QHS 4 mg at 10/15/20 2127    rosuvastatin (CRESTOR) tablet 10 mg  10 mg Oral DAILY 10 mg at 10/15/20 2127    DULoxetine (CYMBALTA) capsule 120 mg  120 mg Oral DAILY 120 mg at 10/15/20 2127    carvediloL (COREG) tablet 25 mg  25 mg Oral BID WITH MEALS 25 mg at 10/16/20 0830           Case discussed with: Hospitalist, Dr. Lanie Zheng DO

## 2020-10-16 NOTE — PROGRESS NOTES
Orthopaedic Progress Note  Post Op day: 3 Days Post-Op    2020 2:45 PM     Patient: Darryl Calix MRN: [id number]  SSN: [id number]    YOB: 1955  Age: 60 y.o. Sex: male      Admit date:  10/11/2020  Date of Surgery:  10/13/2020   Procedures:  Procedure(s):  INCISION AND DRAINAGE RIGHT HAND  Admitting Physician:  Candi Bustos MD   Surgeon:  Ayan Collins) and Role:     Gio Carpio MD - Primary    Consulting Physician(s): Treatment Team: Attending Provider: Donnise Brunner, MD; Consulting Provider: Jacquelyn Bender MD; Consulting Provider: Viridiana Rascon MD; Care Manager: Andree Herbert; Utilization Review: Basim Tobin RN; Wound/Ostomy Nurse: Nat Kitchen RN; Consulting Provider: Birdie Deutsch DO; Primary Nurse: Ean Montes De Oca    SUBJECTIVE:     Darryl Calix is a 60 y.o. male is 3 Days Post-Op s/p Procedure(s):  INCISION AND DRAINAGE RIGHT HAND with an appropriate level of post-operative pain. No complaints of nausea, vomiting, dizziness, lightheadedness, chest pain, or shortness of breath. OBJECTIVE:       Physical Exam:  General: Alert, cooperative, no distress. Respiratory: Respirations unlabored  Neurological:  Neurovascular exam within normal limits. Motor: + DF/PF. Musculoskeletal: Calves soft, supple, non-tender upon palpation. Dressing/Wound:  Clean, dry and intact. No significant erythema or swelling.       Vital Signs:        Patient Vitals for the past 8 hrs:   BP Temp Pulse Resp SpO2   10/16/20 1112 127/70 97.7 °F (36.5 °C) (!) 59 18 95 %   10/16/20 0811 135/70 98 °F (36.7 °C) 60 18 96 %   10/16/20 0701   67                                            Temp (24hrs), Av.3 °F (36.8 °C), Min:97.7 °F (36.5 °C), Max:98.8 °F (37.1 °C)      Labs:        Recent Labs     10/14/20  0724   HCT 38.9   HGB 13.1     Lab Results   Component Value Date/Time    Sodium 138 10/14/2020 07:24 AM    Potassium 3.6 10/14/2020 07:24 AM    Chloride 106 10/14/2020 07:24 AM    CO2 26 10/14/2020 07:24 AM    Glucose 100 10/14/2020 07:24 AM    BUN 20 10/14/2020 07:24 AM    Creatinine 0.99 10/16/2020 05:31 AM    Calcium 8.2 (L) 10/14/2020 07:24 AM       PT/OT:                Patient mobility  Bed Mobility Training  Rolling: Independent  Supine to Sit: Independent  Sit to Supine: Independent  Scooting: Independent  Transfer Training  Sit to Stand: Independent  Stand to Sit: Independent  Bed to Chair: Modified independent                   ASSESSMENT / PLAN:   Principal Problem:    Cellulitis and abscess of hand (10/11/2020)    Active Problems:    Type II diabetes mellitus ([facility] (7/2/2014)      HTN (hypertension) (7/2/2014)      RLS (restless legs syndrome) (7/2/2014)      Psychiatric disorder ()      Overview: depression and anxiety      Unspecified sleep apnea ()      Overview: lost weight      CKD (chronic kidney disease) stage 3, GFR 30-59 ml/min ()      HFrEF (heart failure with reduced ejection fraction) ([facility] (10/11/2020)     DC home per Hospitalist on PO abx per ID recs. Will need outpatient followup with hand therapy with [facility] for wound care. Daily wound packing and soaks. Keep RUE elevated at all times. F/u with CLAUS Parra Next week. Thank you for allowing us to take part in this patients care.       Signed By:  Roseline Olvera PA-C    Orthopedic Surgery   [address] [place] [address]

## 2020-10-16 NOTE — DISCHARGE SUMMARY
Marcio Rubio [facility]  [address] [facility], [address] [place] [address]  [phone]    Physician Discharge Summary     Patient ID:  Wiliam Barrett  [id number]  60 y.o.  1955    Admit date: 10/11/2020    Discharge date and time: 10/16/2020 10:07 AM    Admission Diagnoses: Cellulitis and abscess of hand [L03.119, L02.519]    Discharge Diagnoses:  Principal Diagnosis Cellulitis and abscess of hand                                            Principal Problem:    Cellulitis and abscess of hand (10/11/2020)    Active Problems:    Type II diabetes mellitus ([facility] [address] (7/2/2014)      HTN (hypertension) (7/2/2014)      RLS (restless legs syndrome) (7/2/2014)      Psychiatric disorder ()      Overview: depression and anxiety      Unspecified sleep apnea ()      Overview: lost weight      CKD (chronic kidney disease) stage 3, GFR 30-59 ml/min ()      HFrEF (heart failure with reduced ejection fraction) ([facility] (10/11/2020)           Hospital Course:     61 yo hx of HTN, DM, sCHF EF 15-20%, CKD 3, presented w/ R hand cellulitis     1) R hand cellulitis/abscess: much improved. Due to insect bite or nail puncture. Hx of multiple MRSA skin infection. S/p I&D by ortho on 10/13. Cx growing staph epi, but surgical cx neg. Patient was on IV Vanc/cefepime, but ID recommended 10 days of oral Doxy/omnicef. Cont wound care per Ortho, f/u with Dr. Mary Villalobos      2) Chronic systolic CHF: prior EF 92-78% s/p ICD, but repeat echo with EF 50%. Follows by Dr. Kaylyn Dakins. Volume stable. Cont entresto, coreg, bumex, statin. Cards was following     3) HTN: cont coreg, entresto     4) DM type 2 w/ renal complications: Z6F 5.4%. Cont Birdie Tracie     5) CKD 3: Cr at baseline.   Will monitor      6) Depression/anxiety: cont cymbalta    PCP: Nohemy Yates MD     Consults: Ortho, ID    Significant Diagnostic Studies: I&D of hand    Discharge Exam:  Physical Exam:    Gen:  Well-developed, well-nourished, obese, in no acute distress  HEENT: Pink conjunctivae, PERRL, hearing intact to voice, moist mucous membranes  Neck:  Supple, without masses, thyroid non-tender  Resp:  No accessory muscle use, clear breath sounds without wheezes rales or rhonchi  Card:  No murmurs, normal S1, S2 without thrills, bruits or peripheral edema  Abd:  Soft, non-tender, non-distended, normoactive bowel sounds are present  Musc:  No cyanosis or clubbing  Skin:  R hand dressing c/d/i  Neuro:  Cranial nerves 3-12 are grossly intact, follows commands appropriately  Psych:  Good insight, oriented to person, place and time, alert    Disposition: home  Discharge Condition: Stable    Patient Instructions:   Current Discharge Medication List      START taking these medications    Details   oxyCODONE IR (ROXICODONE) 5 mg immediate release tablet Take 1 Tab by mouth every six (6) hours as needed for Pain for up to 5 days. Max Daily Amount: 20 mg. Indications: pain  Qty: 15 Tab, Refills: 0    Associated Diagnoses: Cellulitis and abscess of hand      senna-docusate (PERICOLACE) 8.6-50 mg per tablet Take 1 Tab by mouth daily. Qty: 30 Tab, Refills: 0      L.acidoph & parac-S.therm-Bifido (BRIAN Q2/RISAQUAD-2) 16 billion cell cap cap Take 1 Cap by mouth daily for 30 days. Qty: 30 Cap, Refills: 0      cefdinir (OMNICEF) 300 mg capsule Take 1 Cap by mouth two (2) times a day for 10 days. Qty: 20 Cap, Refills: 0         CONTINUE these medications which have CHANGED    Details   doxycycline (ADOXA) 100 mg tablet Take 1 Tab by mouth two (2) times a day for 10 days. Qty: 20 Tab, Refills: 0         CONTINUE these medications which have NOT CHANGED    Details   DULoxetine (Cymbalta) 60 mg capsule Take 120 mg by mouth daily. insulin degludec Toshia Pa FlexTouch U-100) 100 unit/mL (3 mL) inpn 48 Units by SubCUTAneous route daily. empagliflozin (Jardiance) 25 mg tablet Take 25 mg by mouth daily.       sacubitriL-valsartan (Entresto) 49-51 mg tab tablet Take 1 Tab by mouth two (2) times a day.      carvedilol (COREG PO) Take 25 mg by mouth two (2) times a day. bumetanide (BUMEX) 1 mg tablet Take 1 mg by mouth daily. rosuvastatin (CRESTOR) 10 mg tablet Take 10 mg by mouth daily. rOPINIRole (Requip) 1 mg tablet Take 4 mg by mouth nightly.          STOP taking these medications       trimethoprim-sulfamethoxazole (Bactrim DS) 160-800 mg per tablet Comments:   Reason for Stopping:             Activity: See surgical instructions  Diet: Diabetic Diet  Wound Care: As directed    Follow-up with  Follow-up Information     Follow up With Specialties Details Why Contact Info    Mary Huber MD Family Medicine Schedule an appointment as soon as possible for a visit in 2 weeks  [address] [place] [address] Avenue  [phone]      Abdiaziz Turcios MD Orthopedic Surgery Schedule an appointment as soon as possible for a visit in 1 week  [address] [place] [address] Drive  [phone]            Follow-up tests/labs none    Signed:  Aminata Anthony MD  10/16/2020  10:07 AM  **I personally spent 34 min on discharge**

## 2020-10-17 LAB
BACTERIA SPEC CULT: NORMAL
BACTERIA SPEC CULT: NORMAL
GRAM STN SPEC: NORMAL
GRAM STN SPEC: NORMAL
SERVICE CMNT-IMP: NORMAL
SERVICE CMNT-IMP: NORMAL

## 2020-11-16 NOTE — ADT AUTH CERT NOTES
progress note for 10/15/20 by Amber Jensen RN  
 
   
Review Status  Review Entered In Primary  10/23/2020 16:06   
   
Criteria Review 10/15/20  inpt telem 
  
Subjective:  
  
Chief Complaint: Bassem Teresa was personally seen and examined by me during this time period. Taurus Funez reviewed.  Doing well, no complaints 
  
   
Objective:  
  
  
Vitals:  
  
  
Last 24hrs VS reviewed since prior progress note. Most recent are: 
  
Visit Vitals /66 (BP 1 Location: Left arm, BP Patient Position: At rest) Pulse 65 Temp 97.5 °F (36.4 °C) Resp 16 Ht 6' (1.829 m) Wt 107 kg (236 lb) SpO2 96% BMI 32.01 kg/m²  
  
     
SpO2 Readings from Last 6 Encounters:  
10/15/20 96% 07/14/14 93%  
  O2 Flow Rate (L/min): 3 l/min  
  
  
Intake/Output Summary (Last 24 hours) at 10/15/2020 1422 Last data filed at 10/15/2020 0820 
     
Gross per 24 hour Intake 480 ml Output  Net 480 ml  
  
  
Exam:  
  
Physical Exam: 
  
Gen:  Well-developed, well-nourished, obese, in no acute distress HEENT:  Pink conjunctivae, PERRL, hearing intact to voice, moist mucous membranes Neck:  Supple, without masses, thyroid non-tender Resp:  No accessory muscle use, clear breath sounds without wheezes rales or rhonchi 
Card:  No murmurs, normal S1, S2 without thrills, bruits or peripheral edema Abd:  Soft, non-tender, non-distended, normoactive bowel sounds are present Musc:  No cyanosis or clubbing Skin:  R hand dressing c/d/i Neuro:  Cranial nerves 3-12 are grossly intact, follows commands appropriately Psych:  Good insight, oriented to person, place and time, alert 
  
Medications Reviewed: (see below) 
  
Lab Data Reviewed: (see below) 
  
______________________________________________________________________ 
  
Medications:  
  
           
Current Facility-Administered Medications Medication Dose Route Frequency  Vancomycin trough 10/15 prior to 2000 dose   Other ONCE  
  vancomycin (VANCOCIN) 1,000 mg in 0.9% sodium chloride (MBP/ADV) 250 mL  1,000 mg IntraVENous Q12H  
 oxyCODONE IR (ROXICODONE) tablet 5 mg  5 mg Oral Q4H PRN  
 HYDROmorphone (PF) (DILAUDID) injection 1 mg  1 mg IntraVENous Q6H PRN  
 senna-docusate (PERICOLACE) 8.6-50 mg per tablet 1 Tab  1 Tab Oral DAILY  insulin glargine (LANTUS) injection 35 Units  35 Units SubCUTAneous DAILY  bumetanide (BUMEX) tablet 1 mg  1 mg Oral DAILY  sodium chloride (NS) flush 5-40 mL  5-40 mL IntraVENous Q8H  
 sodium chloride (NS) flush 5-40 mL  5-40 mL IntraVENous PRN  
 acetaminophen (TYLENOL) tablet 650 mg  650 mg Oral Q6H PRN  
  Or  acetaminophen (TYLENOL) suppository 650 mg  650 mg Rectal Q6H PRN  polyethylene glycol (MIRALAX) packet 17 g  17 g Oral DAILY PRN  
 insulin lispro (HUMALOG) injection   SubCUTAneous QID WITH MEALS  glucose chewable tablet 16 g  4 Tab Oral PRN  
 dextrose (D50W) injection syrg 12.5-25 g  12.5-25 g IntraVENous PRN  
 glucagon (GLUCAGEN) injection 1 mg  1 mg IntraMUSCular PRN  
 sacubitriL-valsartan (ENTRESTO) 49-51 mg tablet 1 Tab  1 Tab Oral Q12H  
 enoxaparin (LOVENOX) injection 40 mg  40 mg SubCUTAneous Q24H  
 rOPINIRole (REQUIP) tablet 4 mg  4 mg Oral QHS  rosuvastatin (CRESTOR) tablet 10 mg  10 mg Oral DAILY  DULoxetine (CYMBALTA) capsule 120 mg  120 mg Oral DAILY  carvediloL (COREG) tablet 25 mg  25 mg Oral BID WITH MEALS  
  
  
  
Lab Review:  
  
     
Recent Labs  
  10/14/20 
9360 WBC 5.5 HGB 13.1 HCT 38.9   
  
         
Recent Labs  
  10/15/20 
8073 10/14/20 
0891 10/13/20 
4339 NA  --  138 137 K  --  3.6 3.9 CL  --  106 107 CO2  --  26 24 GLU  --  100 142* BUN  --  20 20 CREA 0.95 1.03 1.04  
CA  --  8.2* 8.2* MG  --  2.0 2.1 PHOS  --  2.3* 2.4*  
  
         
Lab Results Component Value Date/Time  
  Glucose (POC) 186 (H) 10/15/2020 11:34 AM  
  Glucose (POC) 102 (H) 10/15/2020 06:45 AM  
   Glucose (POC) 168 (H) 10/14/2020 09:23 PM  
  Glucose (POC) 108 (H) 10/14/2020 04:44 PM  
  Glucose (POC) 180 (H) 10/14/2020 11:29 AM  
  
  
   
Assessment / Plan:  
  
59 yo hx of HTN, DM, sCHF EF 15-20%, CKD 3, presented w/ R hand cellulitis   
1) R hand cellulitis/abscess: improving, not septic.  Due to insect bite or nail puncture.  Hx of multiple MRSA skin infection.  S/p I&D by ortho on 10/13.  Cx growing staph epi, but surgical cx pending.  Empirically on IV Vanc.  Use IV dilaudid prn severe pain.  Cont PT/OT, wound care.  ID and ortho following   
2) Chronic systolic CHF: prior EF 44-79% s/p ICD, but repeat echo with EF 50%.  Follows by Dr. Maggie Cazares.  Volume stable.  Cont entresto, coreg, bumex, statin.  Cards following 
  
3) HTN: cont coreg, entresto   
4) DM type 2 w/ renal complications: D5L 9.6%.  Cont Lantus, SSI 
  
5) CKD 3: Cr at baseline.  Will monitor   
6) Depression/anxiety: cont cymbalta 
  
Total time spent with patient: 20 min          **I personally saw and examined the patient during this time [facility] Plan discussed with: Patient, nursing 
  
Discussed:  Care Plan 
  
Prophylaxis:  Lovenox 
  
Disposition:  Home w/Family tomorrow  
  
  
  
  
Ortho surgery note ASSESSMENT / PLAN:  
Active Problems: 
  Type II diabetes mellitus ([facility] [address] (7/2/2014) 
  
  HTN (hypertension) (7/2/2014)   
  RLS (restless legs syndrome) (7/2/2014)   
  Psychiatric disorder () 
    Overview: depression and anxiety 
  
  Unspecified sleep apnea () 
    Overview: lost weight 
  
  CKD (chronic kidney disease) stage 3, GFR 30-59 ml/min () 
  
  HFrEF (heart failure with reduced ejection fraction) ([facility] [address] (10/11/2020)   
  Cellulitis and abscess of hand (10/11/2020)   
  
A: 1. S/P right hand I and D for abscess 2.  S/P right 5th finger flexor tendon I and D. 
  
 P: 1. Changed packing today and redressed wound.  Continue with daily wound care.   
2. Would not discharge until final cultures are back.  Patient reports history of MRSA?, but not in chart.  Pencillin allergy.  Dr. Fara Estrada to make final recommendations for antibiotics.  No bone involvement.   
3. Will need outpatient followup with hand therapy with [facility] for wound care.  Daily wound packing and soaks.   
4. CM to set up for home packing or nursing can teach.    
  
  
  
                                                                                      
   
progress note for 10/14 by Jeremy Bernal RN  
 
   
Review Status  Review Entered In Primary  10/23/2020 16:03   
   
[facility] 
  
[facility]ist note Subjective:  
  
Chief Complaint: Jeramie Garcia was personally seen and examined by me during this time period. Macy Richards reviewed. Audrey De La Cruz is feeling better.  No fevers/chills 
  
   
Objective:  
  
  
Vitals:  
  
  
Last 24hrs VS reviewed since prior progress note. Most recent are: 
  
Visit Vitals /72 (BP 1 Location: Right arm, BP Patient Position: At rest) Pulse 68 Temp 98.2 °F (36.8 °C) Resp 18 Ht 6' (1.829 m) Wt 107 kg (236 lb) SpO2 98% BMI 32.01 kg/m²  
  
     
SpO2 Readings from Last 6 Encounters:  
10/14/20 98% 07/14/14 93%  
  O2 Flow Rate (L/min): 3 l/min  
  
  
Intake/Output Summary (Last 24 hours) at 10/14/2020 1233 Last data filed at 10/14/2020 9394 
     
Gross per 24 hour Intake 1420 ml Output 250 ml Net 1170 ml  
  
  
Exam:  
  
Physical Exam: 
  
Gen:  Well-developed, well-nourished, obese, in no acute distress HEENT:  Pink conjunctivae, PERRL, hearing intact to voice, moist mucous membranes Neck:  Supple, without masses, thyroid non-tender Resp:  No accessory muscle use, clear breath sounds without wheezes rales or rhonchi 
Card:  No murmurs, normal S1, S2 without thrills, bruits or peripheral edema Abd:  Soft, non-tender, non-distended, normoactive bowel sounds are present Musc:  No cyanosis or clubbing Skin:  R hand dressing c/d/i Neuro:  Cranial nerves 3-12 are grossly intact, follows commands appropriately Psych:  Good insight, oriented to person, place and time, alert 
  
Medications Reviewed: (see below) 
  
Lab Data Reviewed: (see below) 
  
______________________________________________________________________ 
  
Medications:  
  
           
Current Facility-Administered Medications Medication Dose Route Frequency  [START ON 10/15/2020] Vancomycin trough 10/15 prior to 2000 dose   Other ONCE  
 vancomycin (VANCOCIN) 1,000 mg in 0.9% sodium chloride (MBP/ADV) 250 mL  1,000 mg IntraVENous Q12H  
 oxyCODONE IR (ROXICODONE) tablet 5 mg  5 mg Oral Q4H PRN  
 HYDROmorphone (PF) (DILAUDID) injection 1 mg  1 mg IntraVENous Q6H PRN  
 senna-docusate (PERICOLACE) 8.6-50 mg per tablet 1 Tab  1 Tab Oral DAILY  insulin glargine (LANTUS) injection 35 Units  35 Units SubCUTAneous DAILY  bumetanide (BUMEX) tablet 1 mg  1 mg Oral DAILY  sodium chloride (NS) flush 5-40 mL  5-40 mL IntraVENous Q8H  
 sodium chloride (NS) flush 5-40 mL  5-40 mL IntraVENous PRN  
 acetaminophen (TYLENOL) tablet 650 mg  650 mg Oral Q6H PRN  
  Or  acetaminophen (TYLENOL) suppository 650 mg  650 mg Rectal Q6H PRN  polyethylene glycol (MIRALAX) packet 17 g  17 g Oral DAILY PRN  
 insulin lispro (HUMALOG) injection   SubCUTAneous QID WITH MEALS  glucose chewable tablet 16 g  4 Tab Oral PRN  
 dextrose (D50W) injection syrg 12.5-25 g  12.5-25 g IntraVENous PRN  
 glucagon (GLUCAGEN) injection 1 mg  1 mg IntraMUSCular PRN  
 sacubitriL-valsartan (ENTRESTO) 49-51 mg tablet 1 Tab  1 Tab Oral Q12H  
 enoxaparin (LOVENOX) injection 40 mg  40 mg SubCUTAneous Q24H  
 rOPINIRole (REQUIP) tablet 4 mg  4 mg Oral QHS  rosuvastatin (CRESTOR) tablet 10 mg  10 mg Oral DAILY  DULoxetine (CYMBALTA) capsule 120 mg  120 mg Oral DAILY  carvediloL (COREG) tablet 25 mg  25 mg Oral BID WITH MEALS  
  
  
  
Lab Review:  
  
         
Recent Labs  
  10/14/20 
0724 10/12/20 
0014 10/11/20 
1455 WBC 5.5 7.6 6.1 HGB 13.1 13.1 13.5 HCT 38.9 38.8 39.3  255 256  
  
           
Recent Labs  
  10/14/20 
8724 10/13/20 
0521 10/12/20 
0014 10/11/20 
1455  137 134* 136  
K 3.6 3.9 4.1 4.1  107 103 103 CO2 26 24 26 25  142* 128* 267* BUN 20 20 20 18 CREA 1.03 1.04 1.13 1.33* CA 8.2* 8.2* 8.2* 8.5 MG 2.0 2.1 2.1  --   
PHOS 2.3* 2.4* 3.2  --   
ALB  --   --  2.5* 2.6* TBILI  --   --  0.4 0.4 ALT  --   --  11* 11*  
  
         
Lab Results Component Value Date/Time  
  Glucose (POC) 180 (H) 10/14/2020 11:29 AM  
  Glucose (POC) 104 (H) 10/14/2020 06:53 AM  
  Glucose (POC) 159 (H) 10/13/2020 09:36 PM  
  Glucose (POC) 273 (H) 10/13/2020 04:25 PM  
  Glucose (POC) 235 (H) 10/13/2020 12:41 PM  
  
  
   
Assessment / Plan:  
  
59 yo hx of HTN, DM, sCHF EF 15-20%, CKD 3, presented w/ R hand cellulitis   
1) R hand cellulitis/abscess: due to insect bite or nail puncture.  Not septic.  S/p I&D by ortho on 10/13.  Cx growing staph epi.  Empirically on IV Vanc.  Will d/c cefepime, Flagyl today.  Use IV dilaudid prn severe pain.  Cont PT/OT, wound care   
2) Chronic systolic CHF: prior EF 13-47% s/p ICD, but repeat echo with EF 50%.  Follows by Dr. Yaquelin Tracy.  Volume stable.  Cont entresto, coreg, bumex, statin.  Cards following 
  
3) HTN: cont coreg, entresto   
4) DM type 2 w/ renal complications: U2D 0.7%.  Cont Lantus, SSI 
  
5) CKD 3: Cr at baseline.  Will monitor   
6) Depression/anxiety: cont cymbalta 
  
Total time spent with patient: 25 min          **I personally saw and examined the patient during this time period** 
                                                                                                              
 Care Plan discussed with: Patient, nursing 
  
Discussed:  Care Plan 
  
Prophylaxis:  Lovenox 
  
Disposition:  Home w/Family tomorrow  
  
  
  
ID note Impression/Plan · Right hand cellulitis following puncture wound.  Status post I&D 10/13/2020.  Operative cultures are pending.  Unfortunately patient's antibiotic options are limited by penicillin allergy and drug drug interactions.  Continue vancomycin.  Await operative cultures.  Anticipate discharge on oral antibiotic regimen · Penicillin allergy.  The patient is able to tolerate cephalosporin antibiotics · CKD.  Stable · Diabetes mellitus.  This is poorly controlled.  Hemoglobin A1c greater than 9 
· Depression/anxiety. Viridiana Graff

## 2021-04-06 ENCOUNTER — HOSPITAL ENCOUNTER (OUTPATIENT)
Dept: PREADMISSION TESTING | Age: 66
Discharge: HOME | End: 2021-04-06
Payer: COMMERCIAL

## 2021-04-06 VITALS
HEART RATE: 74 BPM | TEMPERATURE: 96.2 F | HEIGHT: 71 IN | OXYGEN SATURATION: 97 % | BODY MASS INDEX: 32.26 KG/M2 | SYSTOLIC BLOOD PRESSURE: 125 MMHG | DIASTOLIC BLOOD PRESSURE: 63 MMHG | WEIGHT: 230.4 LBS | RESPIRATION RATE: 16 BRPM

## 2021-04-06 LAB
25(OH)D3 SERPL-MCNC: 17.4 NG/ML (ref 30–100)
ABO + RH BLD: NORMAL
ALBUMIN SERPL-MCNC: 4.1 G/DL (ref 3.5–5)
ALBUMIN/GLOB SERPL: 1.2 {RATIO} (ref 1.1–2.2)
ALP SERPL-CCNC: 138 U/L (ref 45–117)
ALT SERPL-CCNC: 21 U/L (ref 12–78)
ANION GAP SERPL CALC-SCNC: 4 MMOL/L (ref 5–15)
APPEARANCE UR: CLEAR
APTT PPP: 27.9 SEC (ref 22.1–31)
AST SERPL-CCNC: 15 U/L (ref 15–37)
ATRIAL RATE: 80 BPM
BACTERIA URNS QL MICRO: NEGATIVE /HPF
BASOPHILS # BLD: 0.1 K/UL (ref 0–0.1)
BASOPHILS NFR BLD: 1 % (ref 0–1)
BILIRUB SERPL-MCNC: 0.5 MG/DL (ref 0.2–1)
BILIRUB UR QL: NEGATIVE
BLOOD GROUP ANTIBODIES SERPL: NORMAL
BUN SERPL-MCNC: 29 MG/DL (ref 6–20)
BUN/CREAT SERPL: 22 (ref 12–20)
CALCIUM SERPL-MCNC: 9.5 MG/DL (ref 8.5–10.1)
CALCULATED P AXIS, ECG09: 77 DEGREES
CALCULATED R AXIS, ECG10: 66 DEGREES
CALCULATED T AXIS, ECG11: 103 DEGREES
CHLORIDE SERPL-SCNC: 102 MMOL/L (ref 97–108)
CO2 SERPL-SCNC: 31 MMOL/L (ref 21–32)
COLOR UR: ABNORMAL
CREAT SERPL-MCNC: 1.3 MG/DL (ref 0.7–1.3)
DIAGNOSIS, 93000: NORMAL
DIFFERENTIAL METHOD BLD: ABNORMAL
EOSINOPHIL # BLD: 0.2 K/UL (ref 0–0.4)
EOSINOPHIL NFR BLD: 1 % (ref 0–7)
EPITH CASTS URNS QL MICRO: ABNORMAL /LPF
ERYTHROCYTE [DISTWIDTH] IN BLOOD BY AUTOMATED COUNT: 12.5 % (ref 11.5–14.5)
EST. AVERAGE GLUCOSE BLD GHB EST-MCNC: 266 MG/DL
GLOBULIN SER CALC-MCNC: 3.3 G/DL (ref 2–4)
GLUCOSE SERPL-MCNC: 168 MG/DL (ref 65–100)
GLUCOSE UR STRIP.AUTO-MCNC: >1000 MG/DL
HBA1C MFR BLD: 10.9 % (ref 4–5.6)
HCT VFR BLD AUTO: 47.7 % (ref 36.6–50.3)
HGB BLD-MCNC: 16.2 G/DL (ref 12.1–17)
HGB UR QL STRIP: ABNORMAL
HYALINE CASTS URNS QL MICRO: ABNORMAL /LPF (ref 0–5)
IMM GRANULOCYTES # BLD AUTO: 0.1 K/UL (ref 0–0.04)
IMM GRANULOCYTES NFR BLD AUTO: 1 % (ref 0–0.5)
INR PPP: 1.1 (ref 0.9–1.1)
KETONES UR QL STRIP.AUTO: NEGATIVE MG/DL
LEUKOCYTE ESTERASE UR QL STRIP.AUTO: NEGATIVE
LYMPHOCYTES # BLD: 1.8 K/UL (ref 0.8–3.5)
LYMPHOCYTES NFR BLD: 15 % (ref 12–49)
MCH RBC QN AUTO: 30 PG (ref 26–34)
MCHC RBC AUTO-ENTMCNC: 34 G/DL (ref 30–36.5)
MCV RBC AUTO: 88.3 FL (ref 80–99)
MONOCYTES # BLD: 0.6 K/UL (ref 0–1)
MONOCYTES NFR BLD: 5 % (ref 5–13)
NEUTS SEG # BLD: 9.8 K/UL (ref 1.8–8)
NEUTS SEG NFR BLD: 77 % (ref 32–75)
NITRITE UR QL STRIP.AUTO: NEGATIVE
NRBC # BLD: 0 K/UL (ref 0–0.01)
NRBC BLD-RTO: 0 PER 100 WBC
P-R INTERVAL, ECG05: 182 MS
PH UR STRIP: 5 [PH] (ref 5–8)
PLATELET # BLD AUTO: 301 K/UL (ref 150–400)
PMV BLD AUTO: 10.3 FL (ref 8.9–12.9)
POTASSIUM SERPL-SCNC: 4.6 MMOL/L (ref 3.5–5.1)
PROT SERPL-MCNC: 7.4 G/DL (ref 6.4–8.2)
PROT UR STRIP-MCNC: NEGATIVE MG/DL
PROTHROMBIN TIME: 11.4 SEC (ref 9–11.1)
Q-T INTERVAL, ECG07: 406 MS
QRS DURATION, ECG06: 100 MS
QTC CALCULATION (BEZET), ECG08: 468 MS
RBC # BLD AUTO: 5.4 M/UL (ref 4.1–5.7)
RBC #/AREA URNS HPF: ABNORMAL /HPF (ref 0–5)
SODIUM SERPL-SCNC: 137 MMOL/L (ref 136–145)
SP GR UR REFRACTOMETRY: 1.03 (ref 1–1.03)
SPECIMEN EXP DATE BLD: NORMAL
THERAPEUTIC RANGE,PTTT: NORMAL SECS (ref 58–77)
UA: UC IF INDICATED,UAUC: ABNORMAL
UROBILINOGEN UR QL STRIP.AUTO: 0.2 EU/DL (ref 0.2–1)
VENTRICULAR RATE, ECG03: 80 BPM
WBC # BLD AUTO: 12.5 K/UL (ref 4.1–11.1)
WBC URNS QL MICRO: ABNORMAL /HPF (ref 0–4)

## 2021-04-06 PROCEDURE — 85610 PROTHROMBIN TIME: CPT

## 2021-04-06 PROCEDURE — 93005 ELECTROCARDIOGRAM TRACING: CPT

## 2021-04-06 PROCEDURE — 86901 BLOOD TYPING SEROLOGIC RH(D): CPT

## 2021-04-06 PROCEDURE — 83036 HEMOGLOBIN GLYCOSYLATED A1C: CPT

## 2021-04-06 PROCEDURE — 80053 COMPREHEN METABOLIC PANEL: CPT

## 2021-04-06 PROCEDURE — 85730 THROMBOPLASTIN TIME PARTIAL: CPT

## 2021-04-06 PROCEDURE — 81001 URINALYSIS AUTO W/SCOPE: CPT

## 2021-04-06 PROCEDURE — 82306 VITAMIN D 25 HYDROXY: CPT

## 2021-04-06 PROCEDURE — 85025 COMPLETE CBC W/AUTO DIFF WBC: CPT

## 2021-04-06 PROCEDURE — 36415 COLL VENOUS BLD VENIPUNCTURE: CPT

## 2021-04-06 RX ORDER — CHOLECALCIFEROL TAB 125 MCG (5000 UNIT) 125 MCG
10000 TAB ORAL DAILY
Qty: 60 TAB | Refills: 0 | Status: SHIPPED | OUTPATIENT
Start: 2021-04-06 | End: 2021-05-06

## 2021-04-06 RX ORDER — SEMAGLUTIDE 1.34 MG/ML
1 INJECTION, SOLUTION SUBCUTANEOUS
COMMUNITY
End: 2021-08-31

## 2021-04-06 NOTE — PERIOP NOTES
[address] , [address] [facility]     PRE-ADMISSION TESTING    [phone]     Surgery Date:  Tuesday 4/13/21      Is surgery arrival time given by surgeon? NO    If NO, [facility] staff will call you between 3 and 7pm the day before your surgery with your arrival time. (If your surgery is on a Monday, we will call you the Friday before.)      Call [phone] after 7pm Monday-Friday if you did not receive this call. INSTRUCTIONS BEFORE YOUR SURGERY   When You  Arrive Arrive at the [address] [facility] on the day of your surgery  Have your insurance card, photo ID, and any copayment (if needed)   Food   and   Drink NO food or drink after midnight the night before surgery    This means NO water, gum, mints, coffee, juice, etc.  No alcohol (beer, wine, liquor) 24 hours before and after surgery   Medications to   TAKE   Morning of Surgery MEDICATIONS TO TAKE THE MORNING OF SURGERY WITH A SIP OF WATER:    Carvedilol   cymbalta   CALL DR MASTERSON FOR INSTRUCTIONS FOR INSULIN INSTRUCTIONS FOR DAY OF SURGERY   Medications  To  STOP      7 days before surgery  Non-Steroidal anti-inflammatory Drugs (NSAID's): for example, Ibuprofen (Advil, Motrin), Naproxen (Aleve)   Aspirin, if taking for pain    Herbal supplements, vitamins, and fish oil   Other:  (Pain medications not listed above, including Tylenol may be taken)   Blood  Thinners  If you take  Aspirin, Plavix, Coumadin, or any blood-thinning or anti-blood clot medicine, talk to the doctor who prescribed the medications for pre-operative instructions. Bathing Clothing  Jewelry  Valuables      If you shower the morning of surgery, please do not apply anything to your skin (lotions, powders, deodorant, or makeup, especially mascara)   Follow Chlorhexidine [employer] body wash instructions provided to you during PAT appointment. Begin 3 days prior to surgery.    Do not shave or trim anywhere 24 hours before surgery   Wear your hair loose or down; no pony-tails, buns, or metal hair clips   Wear loose, comfortable, clean clothes   Wear glasses instead of contacts   Leave money, valuables, and jewelry, including body piercings, at home   Going Home - or Spending the Night  SAME-DAY SURGERY: You must have a responsible adult drive you home and stay with you 24 hours after surgery   ADMITS: If your doctor is keeping you in the hospital after surgery, leave personal belongings/luggage in your car until you have a hospital room number. Hospital discharge time is 12 noon  Drivers must be here before 12 noon unless you are told differently   Special Instructions It is now mandated that all surgical patients be tested for COVID-19 prior to surgery. Testing has to be exactly 4 days prior to surgery. Your COVID test date is Friday 4/9/21 between 8:00 am and 11:00 am.       COVID testing will be performed curbside at the [address] [place] [address] [facility]. There will be signs leading you to the testing site. You will need to bring a photo ID with you to be swabbed. Patients are advised to self-quarantine at home after testing and prior to your surgery date. You will be notified if your results are positive. What to watch for:   Coronavirus (COVID-19) affects different people in different ways   It also appears with a wide range of symptoms from mild to severe   Signs usually appear 2-14 days after exposure     If you develop any of the following, notify your doctor immediately:  o Fever  o Chills, with or without a shiver  o Muscle pain  o Headache  o Sore throat  o Dry cough  o New loss of taste or smell  o Tiredness      If you develop any of the following, call 911:  o Shortness of breath  o Difficulty breathing  o Chest pain  o New confusion  Blueness of fingers and/or lips     Follow all instructions so your surgery wont be cancelled. Please, be on time. If a situation occurs and you are delayed the day of surgery, call [phone] or [phone]. If your physical condition changes (like a fever, cold, flu, etc.) call your surgeon. Home medication(s) reviewed and verified via   [employer]   during PAT appointment. The patient was contacted by  IN-PERSON  The patient verbalizes understanding of all instructions and  DOES NOT   need reinforcement.

## 2021-04-06 NOTE — H&P
Preoperative Evaluation                     History and Physical with Surgical Risk Stratification     4/6/2021    CC: Neck pain  Surgery: ACDF     HPI:   Nimo More is a 60 y.o. male referred for pre-operative evaluation by Dr. Tracie Gacsa for surgery on 4/13/21. Leesa Brunson notes 8 years ago he had tingling in his arm. He received injections which helped but he knew it was not a permanent fix. 1.5 years ago her noticed some atrophy in his right thumb area. He has progressively lost strength and dexterity in both hands. He continually drops objects. He went to see neurology who did EMG testing. He has pain in his upper shoulders and neck area. He is RHD. He is followed by Dr. Terry Pantoja for his heart health. We have received cardiac clearance stating he is stable. He denies CP or SOB. He notes he has stopped using his CPAP after some weight loss. He had a spider bite to his right hand in October of last year. He underwent surgery and has since healed. He has a history of multiple MRSA skin infections. The patient was evaluated in the surgeon's office and it was determined that the most appropriate plan of care is to proceed with surgical intervention. Patient's PCP Dr. Neha Juan. Review of Systems     Constitutional: Negative for chills and fever  HENT: Negative for congestion and sore throat  Eyes: negative for blurred vision and double vision  Respiratory: Negative for cough, shortness of breath and wheezing  Mouth: Negative for loose, broken or chipped teeth. Cardiovascular: Negative for chest pain and palpitations  Gastrointestinal: Negative for abdominal pain, nausea, diarrhea & constipation  Genitourinary: Negative for dysuria and hematuria  Musculoskeletal: Neck pain,  weakness  Skin: Negative for rash, open wounds. Neurological: Negative for dizziness, tremors and headaches  Psychiatric: The patient is not nervous/anxious.     Inherent Risk of Surgery     Surgical risk: Intermediate  Low:  EIntermediate:   Spinal surgery  Patient Cardiac Risk Assessment     Revised Cardiac Risk Index (RCRI)  Hx of Heart Failure  Rate if cardiac death, nonfatal MI, nonfatal cardiac arrest by number of risk factor- 1.0%    ELLIE/AHA 2007 Guidelines:   1) Surgery Emergency, Non-cardiac -> to surgery  2) If not, look at clinical predictors    Intermediate Minor   Compensated/Prior CHF Diabetes   Blood Thinner: NA    METS      EQUAL TO 4 Care for self Walk indoors around house Walk 2-3 blocks on level ground (2-3 mph) Light work around house (dust, dishes)     Other Risk Factors:   Screening for ETOH use:  Done and low risk  Smoking status:  Currently     Personal or FH of bleeding problems:  No  Personal or FH of blood clots:  No  Personal or FH of anesthesia problems:   No    Pulmonary Risk:  Asthma or COPD:  No  Body mass index is 32.13 kg/m². Known CEASAR:  yes    Past Medical, Surgical, Social History     Allergies: Allergies   Allergen Reactions    Penicillins Hives       Medication Documentation Review Audit     Reviewed by Luella Ormond, RN (Registered Nurse) on 04/06/21 at 0818    Medication Sig Documenting Provider Last Dose Status Taking? bumetanide (BUMEX) 1 mg tablet Take 1 mg by mouth daily. Provider, Historical  Active Yes   carvediloL (Coreg) 6.25 mg tablet Take 6.25 mg by mouth two (2) times a day. Provider, Historical  Active Yes   DULoxetine (Cymbalta) 60 mg capsule Take 120 mg by mouth daily. Provider, Historical  Active Yes   empagliflozin (Jardiance) 25 mg tablet Take 25 mg by mouth daily. Provider, Historical  Active Yes   insulin degludec Clide Budge FlexTouch U-100) 100 unit/mL (3 mL) inpn 50 Units by SubCUTAneous route daily. Provider, Historical  Active Yes   rOPINIRole (Requip) 1 mg tablet Take 1 mg by mouth nightly. Provider, Historical  Active Yes   rosuvastatin (CRESTOR) 10 mg tablet Take 10 mg by mouth daily.  Provider, Historical  Active Yes   semaglutide (Ozempic) 1 mg/dose (2 mg/1.5 mL) sub-q pen 1 mg by SubCUTAneous route every seven (7) days. Provider, Historical  Active Yes              Past Medical History:   Diagnosis Date    Anxiety and depression     Carbuncle and furuncle of neck 2015    [facility]est[facility] failure[facility] [address]     Hypertension     ICD (implantable cardioverter-defibrillator) in place     Infection of skin due to methicillin resistant Staphylococcus aureus (MRSA) 2015    Restless leg syndrome     Streptococcal bacteremia     elbow    Type 2 diabetes mellitus ([facility] [address]     Unspecified sleep apnea     lost weight     Past Surgical History:   Procedure Laterality Date    HX CYST INCISION AND DRAINAGE Right 10/13/2020    Hand-     HX CYST REMOVAL  2015    Boils off of back of neck- with infection    HX FREE SKIN GRAFT  2015    HX ORTHOPAEDIC      bone spur right foot.  HX PACEMAKER      AICD    HX UROLOGICAL      hydrocele repair left testicle    HX WISDOM TEETH EXTRACTION       Social History     Tobacco Use    Smoking status: Light Tobacco Smoker     Packs/day: 0.25     Years: 20.00     Pack years: 5.00    Smokeless tobacco: Never Used    Tobacco comment: cigars   Substance Use Topics    Alcohol use: Not Currently     Alcohol/week: 3.3 standard drinks     Types: 4 Cans of beer per week    Drug use: No     No family history on file.     Objective     Vitals:    04/06/21 0803   BP: 125/63   Pulse: 74   Resp: 16   Temp: (!) 96.2 °F (35.7 °C)   SpO2: 97%   Weight: 104.5 kg (230 lb 6.4 oz)   Height: 5' 11\" (1.803 m)       Constitutional:  Appears well,  No Acute Distress, Vitals noted  Psychiatric:   Affect normal, Alert and Oriented to person/place/time    Eyes:   Pupils equally round and reactive, EOMI, conjunctiva clear, eyelids normal  ENT:   External ears and nose normal, teeth normal, gums normal, TMs and Orophyarynx normal  Neck:   General inspection and Thyroid normal.  No abnormal cervical or supraclavicular nodes    Lungs:   Clear to auscultation, good respiratory effort  Heart: Ausculation normal.  Regular rhythm. No cardiac murmurs. No carotid bruits or palpable thrills  Chest wall normal  Musculoskeletal:  weak bilaterally  Extremities:   Without edema, good peripheral pulses  Skin:   Warm to palpation, without rashes, bruising, or suspicious lesions     Recent Results (from the past 72 hour(s))   CBC WITH AUTOMATED DIFF    Collection Time: 04/06/21  8:54 AM   Result Value Ref Range    WBC 12.5 (H) 4.1 - 11.1 K/uL    RBC 5.40 4. 10 - 5.70 M/uL    HGB 16.2 12.1 - 17.0 g/dL    HCT 47.7 36.6 - 50.3 %    MCV 88.3 80.0 - 99.0 FL    MCH 30.0 26.0 - 34.0 PG    MCHC 34.0 30.0 - 36.5 g/dL    RDW 12.5 11.5 - 14.5 %    PLATELET 011 937 - 755 K/uL    MPV 10.3 8.9 - 12.9 FL    NRBC 0.0 0  WBC    ABSOLUTE NRBC 0.00 0.00 - 0.01 K/uL    NEUTROPHILS 77 (H) 32 - 75 %    LYMPHOCYTES 15 12 - 49 %    MONOCYTES 5 5 - 13 %    EOSINOPHILS 1 0 - 7 %    BASOPHILS 1 0 - 1 %    IMMATURE GRANULOCYTES 1 (H) 0.0 - 0.5 %    ABS. NEUTROPHILS 9.8 (H) 1.8 - 8.0 K/UL    ABS. LYMPHOCYTES 1.8 0.8 - 3.5 K/UL    ABS. MONOCYTES 0.6 0.0 - 1.0 K/UL    ABS. EOSINOPHILS 0.2 0.0 - 0.4 K/UL    ABS. BASOPHILS 0.1 0.0 - 0.1 K/UL    ABS. IMM. GRANS. 0.1 (H) 0.00 - 0.04 K/UL    DF AUTOMATED     METABOLIC PANEL, COMPREHENSIVE    Collection Time: 04/06/21  8:54 AM   Result Value Ref Range    Sodium 137 136 - 145 mmol/L    Potassium 4.6 3.5 - 5.1 mmol/L    Chloride 102 97 - 108 mmol/L    CO2 31 21 - 32 mmol/L    Anion gap 4 (L) 5 - 15 mmol/L    Glucose 168 (H) 65 - 100 mg/dL    BUN 29 (H) 6 - 20 MG/DL    Creatinine 1.30 0.70 - 1.30 MG/DL    BUN/Creatinine ratio 22 (H) 12 - 20      GFR est AA >60 >60 ml/min/1.73m2    GFR est non-AA 56 (L) >60 ml/min/1.73m2    Calcium 9.5 8.5 - 10.1 MG/DL    Bilirubin, total 0.5 0.2 - 1.0 MG/DL    ALT (SGPT) 21 12 - 78 U/L    AST (SGOT) 15 15 - 37 U/L    Alk.  phosphatase 138 (H) 45 - 117 U/L    Protein, total 7.4 6.4 - 8.2 g/dL    Albumin 4.1 3.5 - 5.0 g/dL    Globulin 3.3 2.0 - 4.0 g/dL    A-G Ratio 1.2 1.1 - 2.2     HEMOGLOBIN A1C WITH EAG    Collection Time: 04/06/21  8:54 AM   Result Value Ref Range    Hemoglobin A1c 10.9 (H) 4.0 - 5.6 %    Est. average glucose 266 mg/dL   CULTURE, MRSA    Collection Time: 04/06/21  8:54 AM    Specimen: Nares; Nasal   Result Value Ref Range    Special Requests: NO SPECIAL REQUESTS      Culture result: MRSA NOT PRESENT AT 20 HOURS     PROTHROMBIN TIME + INR    Collection Time: 04/06/21  8:54 AM   Result Value Ref Range    INR 1.1 0.9 - 1.1      Prothrombin time 11.4 (H) 9.0 - 11.1 sec   PTT    Collection Time: 04/06/21  8:54 AM   Result Value Ref Range    aPTT 27.9 22.1 - 31.0 sec    aPTT, therapeutic range     58.0 - 77.0 SECS   URINALYSIS W/ REFLEX CULTURE    Collection Time: 04/06/21  8:54 AM    Specimen: Urine   Result Value Ref Range    Color YELLOW/STRAW      Appearance CLEAR CLEAR      Specific gravity 1.028 1.003 - 1.030      pH (UA) 5.0 5.0 - 8.0      Protein Negative NEG mg/dL    Glucose >1,000 (A) NEG mg/dL    Ketone Negative NEG mg/dL    Bilirubin Negative NEG      Blood TRACE (A) NEG      Urobilinogen 0.2 0.2 - 1.0 EU/dL    Nitrites Negative NEG      Leukocyte Esterase Negative NEG      WBC 0-4 0 - 4 /hpf    RBC 0-5 0 - 5 /hpf    Epithelial cells FEW FEW /lpf    Bacteria Negative NEG /hpf    UA:UC IF INDICATED CULTURE NOT INDICATED BY UA RESULT CNI      Hyaline cast 0-2 0 - 5 /lpf   VITAMIN D, 25 HYDROXY    Collection Time: 04/06/21  8:54 AM   Result Value Ref Range    Vitamin D 25-Hydroxy 17.4 (L) 30 - 100 ng/mL   TYPE & SCREEN    Collection Time: 04/06/21  8:54 AM   Result Value Ref Range    Crossmatch Expiration 04/16/2021,2359     ABO/Rh(D) Cosimo Mon POSITIVE     Antibody screen NEG    EKG, 12 LEAD, INITIAL    Collection Time: 04/06/21  9:16 AM   Result Value Ref Range    Ventricular Rate 80 BPM    Atrial Rate 80 BPM    P-R Interval 182 ms    QRS Duration 100 ms    Q-T Interval 406 ms    QTC Calculation (Bezet) 468 ms    Calculated P Axis 77 degrees    Calculated R Axis 66 degrees    Calculated T Axis 103 degrees    Diagnosis       Normal sinus rhythm  Normal ECG  Confirmed by Di Callaway MD, [id number] ([id number]) on 4/6/2021 12:10:22 PM         Assessment and Plan     Assessment/Plan:   1) Cervical Stenosis  2) Pre-Operative Evaluation    Labs and EKG reviewed. MRSA pending    A1C: Notified surgeon of result- 10.9   CBC: Elevated WBC of 12.5- sent to surgeon    Vitamin D treated    Preoperative Clearance  Per RCRI, the patient has a 1.0% risk of cardiac death, nonfatal MI, nonfatal cardiac arrest based on one risk factors. Per ACC/AHA guidelines, patient is intermediate risk for a(n) intermediate risk surgery and may proceed to planned surgery with the above noted risk.     Junaid Rodrigues NP

## 2021-04-07 LAB
BACTERIA SPEC CULT: NORMAL
BACTERIA SPEC CULT: NORMAL
SERVICE CMNT-IMP: NORMAL

## 2021-04-09 ENCOUNTER — HOSPITAL ENCOUNTER (OUTPATIENT)
Dept: PREADMISSION TESTING | Age: 66
Discharge: HOME | End: 2021-04-09
Payer: COMMERCIAL

## 2021-04-09 PROCEDURE — U0005 INFEC AGEN DETEC AMPLI PROBE: HCPCS

## 2021-04-10 LAB — SARS-COV-2, COV2NT: NOT DETECTED

## 2021-05-26 ENCOUNTER — HOSPITAL ENCOUNTER (OUTPATIENT)
Dept: PREADMISSION TESTING | Age: 66
Discharge: HOME | End: 2021-05-26
Payer: COMMERCIAL

## 2021-05-26 VITALS
TEMPERATURE: 96.9 F | DIASTOLIC BLOOD PRESSURE: 72 MMHG | OXYGEN SATURATION: 100 % | HEART RATE: 72 BPM | SYSTOLIC BLOOD PRESSURE: 142 MMHG | WEIGHT: 234.4 LBS | BODY MASS INDEX: 32.81 KG/M2 | RESPIRATION RATE: 20 BRPM | HEIGHT: 71 IN

## 2021-05-26 LAB
25(OH)D3 SERPL-MCNC: 29.9 NG/ML (ref 30–100)
ABO + RH BLD: NORMAL
ALBUMIN SERPL-MCNC: 3.9 G/DL (ref 3.5–5)
ALBUMIN/GLOB SERPL: 1.3 {RATIO} (ref 1.1–2.2)
ALP SERPL-CCNC: 141 U/L (ref 45–117)
ALT SERPL-CCNC: 24 U/L (ref 12–78)
ANION GAP SERPL CALC-SCNC: 5 MMOL/L (ref 5–15)
APPEARANCE UR: CLEAR
APTT PPP: 28.8 SEC (ref 22.1–31)
AST SERPL-CCNC: 18 U/L (ref 15–37)
BACTERIA URNS QL MICRO: NEGATIVE /HPF
BASOPHILS # BLD: 0.1 K/UL (ref 0–0.1)
BASOPHILS NFR BLD: 1 % (ref 0–1)
BILIRUB SERPL-MCNC: 0.7 MG/DL (ref 0.2–1)
BILIRUB UR QL: NEGATIVE
BLOOD GROUP ANTIBODIES SERPL: NORMAL
BUN SERPL-MCNC: 30 MG/DL (ref 6–20)
BUN/CREAT SERPL: 33 (ref 12–20)
CALCIUM SERPL-MCNC: 8.9 MG/DL (ref 8.5–10.1)
CHLORIDE SERPL-SCNC: 106 MMOL/L (ref 97–108)
CO2 SERPL-SCNC: 31 MMOL/L (ref 21–32)
COLOR UR: ABNORMAL
CREAT SERPL-MCNC: 0.92 MG/DL (ref 0.7–1.3)
DIFFERENTIAL METHOD BLD: NORMAL
EOSINOPHIL # BLD: 0.1 K/UL (ref 0–0.4)
EOSINOPHIL NFR BLD: 1 % (ref 0–7)
EPITH CASTS URNS QL MICRO: ABNORMAL /LPF
ERYTHROCYTE [DISTWIDTH] IN BLOOD BY AUTOMATED COUNT: 13 % (ref 11.5–14.5)
EST. AVERAGE GLUCOSE BLD GHB EST-MCNC: 160 MG/DL
GLOBULIN SER CALC-MCNC: 3 G/DL (ref 2–4)
GLUCOSE SERPL-MCNC: 92 MG/DL (ref 65–100)
GLUCOSE UR STRIP.AUTO-MCNC: >1000 MG/DL
HBA1C MFR BLD: 7.2 % (ref 4–5.6)
HCT VFR BLD AUTO: 43.9 % (ref 36.6–50.3)
HGB BLD-MCNC: 14.6 G/DL (ref 12.1–17)
HGB UR QL STRIP: ABNORMAL
IMM GRANULOCYTES # BLD AUTO: 0 K/UL (ref 0–0.04)
IMM GRANULOCYTES NFR BLD AUTO: 0 % (ref 0–0.5)
INR PPP: 1.1 (ref 0.9–1.1)
KETONES UR QL STRIP.AUTO: NEGATIVE MG/DL
LEUKOCYTE ESTERASE UR QL STRIP.AUTO: NEGATIVE
LYMPHOCYTES # BLD: 1.4 K/UL (ref 0.8–3.5)
LYMPHOCYTES NFR BLD: 17 % (ref 12–49)
MCH RBC QN AUTO: 30.2 PG (ref 26–34)
MCHC RBC AUTO-ENTMCNC: 33.3 G/DL (ref 30–36.5)
MCV RBC AUTO: 90.7 FL (ref 80–99)
MONOCYTES # BLD: 0.5 K/UL (ref 0–1)
MONOCYTES NFR BLD: 6 % (ref 5–13)
NEUTS SEG # BLD: 6.5 K/UL (ref 1.8–8)
NEUTS SEG NFR BLD: 75 % (ref 32–75)
NITRITE UR QL STRIP.AUTO: NEGATIVE
NRBC # BLD: 0 K/UL (ref 0–0.01)
NRBC BLD-RTO: 0 PER 100 WBC
PH UR STRIP: 5.5 [PH] (ref 5–8)
PLATELET # BLD AUTO: 203 K/UL (ref 150–400)
PMV BLD AUTO: 10.5 FL (ref 8.9–12.9)
POTASSIUM SERPL-SCNC: 4 MMOL/L (ref 3.5–5.1)
PROT SERPL-MCNC: 6.9 G/DL (ref 6.4–8.2)
PROT UR STRIP-MCNC: NEGATIVE MG/DL
PROTHROMBIN TIME: 11.8 SEC (ref 9–11.1)
RBC # BLD AUTO: 4.84 M/UL (ref 4.1–5.7)
RBC #/AREA URNS HPF: ABNORMAL /HPF (ref 0–5)
SODIUM SERPL-SCNC: 142 MMOL/L (ref 136–145)
SP GR UR REFRACTOMETRY: 1.01 (ref 1–1.03)
SPECIMEN EXP DATE BLD: NORMAL
THERAPEUTIC RANGE,PTTT: NORMAL SECS (ref 58–77)
UA: UC IF INDICATED,UAUC: ABNORMAL
UROBILINOGEN UR QL STRIP.AUTO: 0.2 EU/DL (ref 0.2–1)
WBC # BLD AUTO: 8.6 K/UL (ref 4.1–11.1)
WBC URNS QL MICRO: ABNORMAL /HPF (ref 0–4)

## 2021-05-26 PROCEDURE — 82306 VITAMIN D 25 HYDROXY: CPT

## 2021-05-26 PROCEDURE — 86901 BLOOD TYPING SEROLOGIC RH(D): CPT

## 2021-05-26 PROCEDURE — 83036 HEMOGLOBIN GLYCOSYLATED A1C: CPT

## 2021-05-26 PROCEDURE — 80053 COMPREHEN METABOLIC PANEL: CPT

## 2021-05-26 PROCEDURE — 81001 URINALYSIS AUTO W/SCOPE: CPT

## 2021-05-26 PROCEDURE — 85025 COMPLETE CBC W/AUTO DIFF WBC: CPT

## 2021-05-26 PROCEDURE — 85730 THROMBOPLASTIN TIME PARTIAL: CPT

## 2021-05-26 PROCEDURE — 36415 COLL VENOUS BLD VENIPUNCTURE: CPT

## 2021-05-26 PROCEDURE — 85610 PROTHROMBIN TIME: CPT

## 2021-05-26 RX ORDER — LOSARTAN POTASSIUM 50 MG/1
50 TABLET ORAL
COMMUNITY

## 2021-05-26 NOTE — PERIOP NOTES
[address] [address] [facility]   MAIN OR                                  [phone]   MAIN PRE OP                          [phone]                                                                                AMBULATORY PRE OP          [phone]  PRE-ADMISSION TESTING    [phone]   Surgery Date:  6/8/2021       Is surgery arrival time given by surgeon? NO  If NO, [facility] staff will call you between 3 and 7pm the day before your surgery with your arrival time. (If your surgery is on a Monday, we will call you the Friday before.)    Call [phone] after 7pm Monday-Friday if you did not receive this call. INSTRUCTIONS BEFORE YOUR SURGERY   When You  Arrive Arrive at the [address] [facility] on the day of your surgery  Have your insurance card, photo ID, and any copayment (if needed)   Food   and   Drink NO food or drink after midnight the night before surgery    This means NO water, gum, mints, coffee, juice, etc.  No alcohol (beer, wine, liquor) 24 hours before and after surgery   Medications to   TAKE   Morning of Surgery MEDICATIONS TO TAKE THE MORNING OF SURGERY WITH A SIP OF WATER:    Carvedilol    Do not take Losartan the night before surgery. Contact Dr Laurie Vásquez for instructions for Jardiance the night before surgery. Medications  To  STOP      7 days before surgery  Non-Steroidal anti-inflammatory Drugs (NSAID's): for example, Ibuprofen (Advil, Motrin), Naproxen (Aleve)   Aspirin, if taking for pain    Herbal supplements, vitamins, and fish oil   Other:  (Pain medications not listed above, including Tylenol may be taken)   Blood  Thinners  If you take  Aspirin, Plavix, Coumadin, or any blood-thinning or anti-blood clot medicine, talk to the doctor who prescribed the medications for pre-operative instructions.    Bathing Clothing  Jewelry  Valuables      If you shower the morning of surgery, please do not apply anything to your skin (lotions, powders, deodorant, or makeup, especially mascara)   Follow Chlorhexidine Care Fusion body wash instructions provided to you during PAT appointment. Begin 3 days prior to surgery.  Do not shave or trim anywhere 24 hours before surgery   Wear your hair loose or down; no pony-tails, buns, or metal hair clips   Wear loose, comfortable, clean clothes   Wear glasses instead of contacts  [employer] money, valuables, and jewelry, including body piercings, at home   If you were given an Motribe, bring it on day of surgery. Going Home - or Spending the Night  SAME-DAY SURGERY: You must have a responsible adult drive you home and stay with you 24 hours after surgery   ADMITS: If your doctor is keeping you in the hospital after surgery, leave personal belongings/luggage in your car until you have a hospital room number. Hospital discharge time is 12 noon  Drivers must be here before 12 noon unless you are told differently   Special Instructions   Special Instructions:  · Use Chlorhexidine Care Fusion wash and sponges 3 days prior to surgery as instructed. · Incentive spirometer given with instructions to practice at home and bring back to the hospital on the day of surgery. · [facility] will contact you if your Hemoglobin A1C is greater than 7.5. · Ensure/Glucerna  sample, nutritional information, and Ensure/Glucerna coupon given. · Pain pamphlet and Call Don't Fall reminder reviewed with patient. · Bring PTA Medication list day of surgery with the last doses taken documented    It is now mandated that all surgical patients be tested for COVID-19 prior to surgery. Testing has to be exactly 4 days prior to surgery. Your COVID test date is 6/4/2021 between 8:00 am and 11:00 am.       COVID testing will be performed curbside at the [address]. There will be signs leading you to the testing site.  You will need to bring a photo ID with you to be swabbed. Patients are advised to self-quarantine at home after testing and prior to your surgery date. You will be notified if your results are positive. What to watch for:   Coronavirus (COVID-19) affects different people in different ways   It also appears with a wide range of symptoms from mild to severe   Signs usually appear 2-14 days after exposure     If you develop any of the following, notify your doctor immediately:  o Fever  o Chills, with or without a shiver  o Muscle pain  o Headache  o Sore throat  o Dry cough  o New loss of taste or smell  o Tiredness      If you develop any of the following, call [phone]:  o Shortness of breath  o Difficulty breathing  o Chest pain  o New confusion  o Blueness of fingers and/or lips       Follow all instructions so your surgery wont be cancelled. Please, be on time. If a situation occurs and you are delayed the day of surgery, call [phone]. If your physical condition changes (like a fever, cold, flu, etc.) call your surgeon. Home medication(s) reviewed and verified via  LIST during PAT appointment. The patient was contacted by   IN-PERSON  The patient verbalizes understanding of all instructions and DOES NOT   need reinforcement.

## 2021-05-27 LAB
BACTERIA SPEC CULT: NORMAL
BACTERIA SPEC CULT: NORMAL
SERVICE CMNT-IMP: NORMAL

## 2021-06-02 NOTE — H&P
History and Physical    Patient: Cirilo Landeros MRN: [id number]  SSN: [id number]    YOB: 1955  Age: 73 y.o. Sex: male      CC: Neck pain    Subjective:      Cirilo Landeros is a 73 y.o. male who has been sent for a pre-operative evaluation for surgery on 6/8/21 with Dr. Marco Vu. Connie Pro was here in April but had to be cancelled because his A1C was 10.9. He notes he has been working on his diet and he is now confident it has come down. He is having sx because of his right hand atrophy between his thumb and first finger. For full pain note see previous H/P. He is followed by Wero Weems who gave surgical clearance in April. No new c/o CP or SOB. He does have a functioning ICD. Past Medical History:   Diagnosis Date    Anxiety and depression     Carbuncle and furuncle of neck 2015    CHF (congestive heart failure) ([address]     COVID-19 vaccine series completed     [facility]     ICD (implantable cardioverter-defibrillator) in place     Infection of skin due to methicillin resistant Staphylococcus aureus (MRSA) 2015    Restless leg syndrome     Streptococcal bacteremia     elbow    Type 2 diabetes mellitus ([address]     Unspecified sleep apnea     lost weight     Past Surgical History:   Procedure Laterality Date    HX CYST INCISION AND DRAINAGE Right 10/13/2020    Hand-     HX CYST REMOVAL  2015    Boils off of back of neck- with infection    HX FREE SKIN GRAFT  2015    HX ORTHOPAEDIC      bone spur right foot.  HX PACEMAKER      AICD    HX UROLOGICAL      hydrocele repair left testicle    HX WISDOM TEETH EXTRACTION        No family history on file.   Social History     Tobacco Use    Smoking status: Light Tobacco Smoker     Packs/day: 0.25     Years: 20.00     Pack years: 5.00    Smokeless tobacco: Never Used    Tobacco comment: cigars   Substance Use Topics    Alcohol use: Not Currently     Alcohol/week: 3.3 standard drinks     Types: 4 Cans of beer per week    Drug use: No      Prior to Admission medications    Medication Sig Start Date End Date Taking? Authorizing Provider   cholecalciferol, vitamin D3, (VITAMIN D3 PO) Take 10,000 Units by mouth nightly. Yes Provider, Historical   LOSARTAN PO Take  by mouth nightly. Pt does not recall dose   Yes Provider, Historical   DULoxetine (Cymbalta) 60 mg capsule Take 120 mg by mouth nightly. Yes Provider, Historical   insulin degludec Clide Budge FlexTouch U-100) 100 unit/mL (3 mL) inpn 50 Units by SubCUTAneous route every evening. Yes Provider, Historical   empagliflozin (Jardiance) 25 mg tablet Take 25 mg by mouth nightly. Yes Provider, Historical   carvediloL (Coreg) 6.25 mg tablet Take 6.25 mg by mouth two (2) times a day. Yes Provider, Historical   bumetanide (BUMEX) 1 mg tablet Take 1 mg by mouth nightly. Yes Provider, Historical   rosuvastatin (CRESTOR) 10 mg tablet Take 10 mg by mouth nightly. Yes Provider, Historical   rOPINIRole (Requip) 1 mg tablet Take 1 mg by mouth nightly. Yes Provider, Historical   semaglutide (Ozempic) 1 mg/dose (2 mg/1.5 mL) sub-q pen 1 mg by SubCUTAneous route every seven (7) days. SUNDAY    Provider, Historical        Allergies   Allergen Reactions    Penicillins Hives       Review of Systems:  Constitutional: Negative for chills and fever  HENT: Negative for congestion and sore throat  Eyes: negative for blurred vision and double vision  Respiratory: Negative for cough, shortness of breath and wheezing  Mouth: Negative for loose, broken or chipped teeth. Cardiovascular: Negative for chest pain and palpitations  Gastrointestinal: Negative for abdominal pain, constipation, diarrhea and nausea  Genitourinary: Negative for dysuria and hematuria  Musculoskeletal: Mild neck pain, atrophy to right thumb  Skin: Negative for rash, open wounds. Negative for bruises easily  Neurological: Negative for dizziness, tremors and headaches  Psychiatric: Negative for depression.  The patient is not nervous/anxious. Objective:     Vitals:    05/26/21 0807   BP: (!) 142/72   Pulse: 72   Resp: 20   Temp: 96.9 °F (36.1 °C)   SpO2: 100%   Weight: 106.3 kg (234 lb 6.4 oz)   Height: 5' 11\" (1.803 m)        Physical Exam:  Constitutional:  Appears well,  No Acute Distress, Vitals noted  Psychiatric:   Affect normal, Alert and Oriented to person/place/time    Eyes:   Pupils equally round and reactive, EOMI, conjunctiva clear, eyelids normal  ENT:   External ears and nose normal, teeth normal, gums normal, TMs and Orophyarynx normal  Neck:   General inspection and Thyroid normal.  No abnormal cervical or supraclavicular nodes    Lungs:   Clear to auscultation, good respiratory effort  Heart: Ausculation normal.  Regular rhythm. No cardiac murmurs. No carotid bruits or palpable thrills  Chest wall normal  Musculoskeletal:  weaker right hand  Extremities:   Without edema, good peripheral pulses  Skin:   Warm to palpation, without rashes, bruising, or suspicious lesions     Recent Results (from the past 336 hour(s))   TYPE & SCREEN    Collection Time: 05/26/21  9:09 AM   Result Value Ref Range    Crossmatch Expiration 06/09/2021,2359     ABO/Rh(D) Marni Kida POSITIVE     Antibody screen NEG    CBC WITH AUTOMATED DIFF    Collection Time: 05/26/21  9:09 AM   Result Value Ref Range    WBC 8.6 4.1 - 11.1 K/uL    RBC 4.84 4.10 - 5.70 M/uL    HGB 14.6 12.1 - 17.0 g/dL    HCT 43.9 36.6 - 50.3 %    MCV 90.7 80.0 - 99.0 FL    MCH 30.2 26.0 - 34.0 PG    MCHC 33.3 30.0 - 36.5 g/dL    RDW 13.0 11.5 - 14.5 %    PLATELET 943 350 - 430 K/uL    MPV 10.5 8.9 - 12.9 FL    NRBC 0.0 0  WBC    ABSOLUTE NRBC 0.00 0.00 - 0.01 K/uL    NEUTROPHILS 75 32 - 75 %    LYMPHOCYTES 17 12 - 49 %    MONOCYTES 6 5 - 13 %    EOSINOPHILS 1 0 - 7 %    BASOPHILS 1 0 - 1 %    IMMATURE GRANULOCYTES 0 0.0 - 0.5 %    ABS. NEUTROPHILS 6.5 1.8 - 8.0 K/UL    ABS. LYMPHOCYTES 1.4 0.8 - 3.5 K/UL    ABS. MONOCYTES 0.5 0.0 - 1.0 K/UL    ABS.  EOSINOPHILS 0.1 0.0 - 0.4 K/UL    ABS. BASOPHILS 0.1 0.0 - 0.1 K/UL    ABS. IMM. GRANS. 0.0 0.00 - 0.04 K/UL    DF AUTOMATED     METABOLIC PANEL, COMPREHENSIVE    Collection Time: 05/26/21  9:09 AM   Result Value Ref Range    Sodium 142 136 - 145 mmol/L    Potassium 4.0 3.5 - 5.1 mmol/L    Chloride 106 97 - 108 mmol/L    CO2 31 21 - 32 mmol/L    Anion gap 5 5 - 15 mmol/L    Glucose 92 65 - 100 mg/dL    BUN 30 (H) 6 - 20 MG/DL    Creatinine 0.92 0.70 - 1.30 MG/DL    BUN/Creatinine ratio 33 (H) 12 - 20      GFR est AA >60 >60 ml/min/1.73m2    GFR est non-AA >60 >60 ml/min/1.73m2    Calcium 8.9 8.5 - 10.1 MG/DL    Bilirubin, total 0.7 0.2 - 1.0 MG/DL    ALT (SGPT) 24 12 - 78 U/L    AST (SGOT) 18 15 - 37 U/L    Alk. phosphatase 141 (H) 45 - 117 U/L    Protein, total 6.9 6.4 - 8.2 g/dL    Albumin 3.9 3.5 - 5.0 g/dL    Globulin 3.0 2.0 - 4.0 g/dL    A-G Ratio 1.3 1.1 - 2.2     HEMOGLOBIN A1C WITH EAG    Collection Time: 05/26/21  9:09 AM   Result Value Ref Range    Hemoglobin A1c 7.2 (H) 4.0 - 5.6 %    Est. average glucose 160 mg/dL   CULTURE, MRSA    Collection Time: 05/26/21  9:09 AM    Specimen: Nares; Nasal   Result Value Ref Range    Special Requests: NO SPECIAL REQUESTS      Culture result: MRSA NOT PRESENT      Culture result:        Screening of patient nares for MRSA is for surveillance purposes and, if positive, to facilitate isolation considerations in high risk settings. It is not intended for automatic decolonization interventions per se as regimens are not sufficiently effective to warrant routine use.    PROTHROMBIN TIME + INR    Collection Time: 05/26/21  9:09 AM   Result Value Ref Range    INR 1.1 0.9 - 1.1      Prothrombin time 11.8 (H) 9.0 - 11.1 sec   PTT    Collection Time: 05/26/21  9:09 AM   Result Value Ref Range    aPTT 28.8 22.1 - 31.0 sec    aPTT, therapeutic range     58.0 - 77.0 SECS   URINALYSIS W/ REFLEX CULTURE    Collection Time: 05/26/21  9:09 AM    Specimen: Urine   Result Value Ref Range    Color YELLOW/STRAW Appearance CLEAR CLEAR      Specific gravity 1.010 1.003 - 1.030      pH (UA) 5.5 5.0 - 8.0      Protein Negative NEG mg/dL    Glucose >1,000 (A) NEG mg/dL    Ketone Negative NEG mg/dL    Bilirubin Negative NEG      Blood SMALL (A) NEG      Urobilinogen 0.2 0.2 - 1.0 EU/dL    Nitrites Negative NEG      Leukocyte Esterase Negative NEG      WBC 0-4 0 - 4 /hpf    RBC 0-5 0 - 5 /hpf    Epithelial cells FEW FEW /lpf    Bacteria Negative NEG /hpf    UA:UC IF INDICATED CULTURE NOT INDICATED BY UA RESULT CNI     VITAMIN D, 25 HYDROXY    Collection Time: 05/26/21  9:09 AM   Result Value Ref Range    Vitamin D 25-Hydroxy 29.9 (L) 30 - 100 ng/mL       Assessment:     Cervical Stenosis    Plan:     C 5-7 ACDF  He is taking Vitamin D already    A1C is now 7.2. EKG from April and cardiac note in CC.      Signed By: Sammie Jain NP     June 2, 2021

## 2021-06-04 ENCOUNTER — HOSPITAL ENCOUNTER (OUTPATIENT)
Dept: PREADMISSION TESTING | Age: 66
Discharge: HOME | End: 2021-06-04
Payer: COMMERCIAL

## 2021-06-04 PROCEDURE — U0003 INFECTIOUS AGENT DETECTION BY NUCLEIC ACID (DNA OR RNA); SEVERE ACUTE RESPIRATORY SYNDROME CORONAVIRUS 2 (SARS-COV-2) (CORONAVIRUS DISEASE [COVID-19]), AMPLIFIED PROBE TECHNIQUE, MAKING USE OF HIGH THROUGHPUT TECHNOLOGIES AS DESCRIBED BY CMS-2020-01-R: HCPCS

## 2021-06-05 LAB — SARS-COV-2, COV2NT: NOT DETECTED

## 2021-06-07 ENCOUNTER — ANESTHESIA EVENT (OUTPATIENT)
Dept: SURGERY | Age: 66
End: 2021-06-07
Payer: COMMERCIAL

## 2021-06-07 NOTE — PERIOP NOTES
Patient with history of MRSA in 2014.  3 negative MRSA nasal cultures are found in [facility] since then, with the last one being on 5/26/2021. NO need for contact isolation for the day of surgery.   DOS: 6/8/2021

## 2021-06-08 ENCOUNTER — APPOINTMENT (OUTPATIENT)
Dept: GENERAL RADIOLOGY | Age: 66
End: 2021-06-08
Attending: ORTHOPAEDIC SURGERY
Payer: COMMERCIAL

## 2021-06-08 ENCOUNTER — ANESTHESIA (OUTPATIENT)
Dept: SURGERY | Age: 66
End: 2021-06-08
Payer: COMMERCIAL

## 2021-06-08 ENCOUNTER — HOSPITAL ENCOUNTER (OUTPATIENT)
Age: 66
Setting detail: OBSERVATION
Discharge: HOME | End: 2021-06-09
Attending: ORTHOPAEDIC SURGERY | Admitting: ORTHOPAEDIC SURGERY
Payer: COMMERCIAL

## 2021-06-08 DIAGNOSIS — M48.02 CERVICAL STENOSIS OF SPINAL CANAL: Primary | ICD-10-CM

## 2021-06-08 DIAGNOSIS — E11.9 CONTROLLED TYPE 2 DIABETES MELLITUS WITHOUT COMPLICATION, UNSPECIFIED WHETHER LONG TERM INSULIN USE (HCC): ICD-10-CM

## 2021-06-08 LAB
GLUCOSE BLD STRIP.AUTO-MCNC: 109 MG/DL (ref 65–117)
GLUCOSE BLD STRIP.AUTO-MCNC: 112 MG/DL (ref 65–117)
SERVICE CMNT-IMP: NORMAL
SERVICE CMNT-IMP: NORMAL

## 2021-06-08 PROCEDURE — 77030029099 HC BN WAX SSPC -A: Performed by: ORTHOPAEDIC SURGERY

## 2021-06-08 PROCEDURE — 74011250636 HC RX REV CODE- 250/636: Performed by: NURSE ANESTHETIST, CERTIFIED REGISTERED

## 2021-06-08 PROCEDURE — 74011250637 HC RX REV CODE- 250/637: Performed by: ORTHOPAEDIC SURGERY

## 2021-06-08 PROCEDURE — 99218 HC RM OBSERVATION: CPT

## 2021-06-08 PROCEDURE — 77030003666 HC NDL SPINAL BD -A: Performed by: ORTHOPAEDIC SURGERY

## 2021-06-08 PROCEDURE — 77030030102 HC BIT DRL PYRNES K2M -B: Performed by: ORTHOPAEDIC SURGERY

## 2021-06-08 PROCEDURE — 2709999900 HC NON-CHARGEABLE SUPPLY: Performed by: ORTHOPAEDIC SURGERY

## 2021-06-08 PROCEDURE — 77030011267 HC ELECTRD BLD COVD -A: Performed by: ORTHOPAEDIC SURGERY

## 2021-06-08 PROCEDURE — C1713 ANCHOR/SCREW BN/BN,TIS/BN: HCPCS | Performed by: ORTHOPAEDIC SURGERY

## 2021-06-08 PROCEDURE — 77030028271 HC SRGFL HEMSTAT MTRX KT J&J -C: Performed by: ORTHOPAEDIC SURGERY

## 2021-06-08 PROCEDURE — 77030034475 HC MISC IMPL SPN: Performed by: ORTHOPAEDIC SURGERY

## 2021-06-08 PROCEDURE — 77030031139 HC SUT VCRL2 J&J -A: Performed by: ORTHOPAEDIC SURGERY

## 2021-06-08 PROCEDURE — 74011250636 HC RX REV CODE- 250/636: Performed by: ORTHOPAEDIC SURGERY

## 2021-06-08 PROCEDURE — 76210000019 HC OR PH I REC 4 TO 4.5 HR: Performed by: ORTHOPAEDIC SURGERY

## 2021-06-08 PROCEDURE — 77030018673: Performed by: ORTHOPAEDIC SURGERY

## 2021-06-08 PROCEDURE — 74011250636 HC RX REV CODE- 250/636: Performed by: ANESTHESIOLOGY

## 2021-06-08 PROCEDURE — 77030026438 HC STYL ET INTUB CARD -A: Performed by: STUDENT IN AN ORGANIZED HEALTH CARE EDUCATION/TRAINING PROGRAM

## 2021-06-08 PROCEDURE — 76010000172 HC OR TIME 2.5 TO 3 HR INTENSV-TIER 1: Performed by: ORTHOPAEDIC SURGERY

## 2021-06-08 PROCEDURE — 77030008684 HC TU ET CUF COVD -B: Performed by: STUDENT IN AN ORGANIZED HEALTH CARE EDUCATION/TRAINING PROGRAM

## 2021-06-08 PROCEDURE — 77030012406 HC DRN WND PENRS BARD -A: Performed by: ORTHOPAEDIC SURGERY

## 2021-06-08 PROCEDURE — 77030004391 HC BUR FLUT MEDT -C: Performed by: ORTHOPAEDIC SURGERY

## 2021-06-08 PROCEDURE — 76060000036 HC ANESTHESIA 2.5 TO 3 HR: Performed by: ORTHOPAEDIC SURGERY

## 2021-06-08 PROCEDURE — 74011000250 HC RX REV CODE- 250: Performed by: ORTHOPAEDIC SURGERY

## 2021-06-08 PROCEDURE — 77030038692 HC WND DEB SYS IRMX -B: Performed by: ORTHOPAEDIC SURGERY

## 2021-06-08 PROCEDURE — 82962 GLUCOSE BLOOD TEST: CPT

## 2021-06-08 PROCEDURE — 77030002933 HC SUT MCRYL J&J -A: Performed by: ORTHOPAEDIC SURGERY

## 2021-06-08 PROCEDURE — 77030040356 HC CORD BPLR FRCP COVD -A: Performed by: ORTHOPAEDIC SURGERY

## 2021-06-08 PROCEDURE — 74011000250 HC RX REV CODE- 250: Performed by: NURSE ANESTHETIST, CERTIFIED REGISTERED

## 2021-06-08 DEVICE — PLATE SPNL L38MM LEV 2 CERV CONSTRN PYRENEES: Type: IMPLANTABLE DEVICE | Site: SPINE CERVICAL | Status: FUNCTIONAL

## 2021-06-08 DEVICE — SCREW SPNL L14MM DIA4MM CERV ST CONSTRN PYRENEES: Type: IMPLANTABLE DEVICE | Site: SPINE CERVICAL | Status: FUNCTIONAL

## 2021-06-08 RX ORDER — PROPOFOL 10 MG/ML
INJECTION, EMULSION INTRAVENOUS AS NEEDED
Status: DISCONTINUED | OUTPATIENT
Start: 2021-06-08 | End: 2021-06-08 | Stop reason: HOSPADM

## 2021-06-08 RX ORDER — FACIAL-BODY WIPES
10 EACH TOPICAL DAILY PRN
Status: DISCONTINUED | OUTPATIENT
Start: 2021-06-10 | End: 2021-06-09 | Stop reason: HOSPADM

## 2021-06-08 RX ORDER — SODIUM CHLORIDE, SODIUM LACTATE, POTASSIUM CHLORIDE, CALCIUM CHLORIDE 600; 310; 30; 20 MG/100ML; MG/100ML; MG/100ML; MG/100ML
125 INJECTION, SOLUTION INTRAVENOUS CONTINUOUS
Status: DISCONTINUED | OUTPATIENT
Start: 2021-06-08 | End: 2021-06-08 | Stop reason: HOSPADM

## 2021-06-08 RX ORDER — CARVEDILOL 6.25 MG/1
6.25 TABLET ORAL 2 TIMES DAILY WITH MEALS
Status: DISCONTINUED | OUTPATIENT
Start: 2021-06-08 | End: 2021-06-09 | Stop reason: HOSPADM

## 2021-06-08 RX ORDER — NALOXONE HYDROCHLORIDE 0.4 MG/ML
0.4 INJECTION, SOLUTION INTRAMUSCULAR; INTRAVENOUS; SUBCUTANEOUS
Status: DISCONTINUED | OUTPATIENT
Start: 2021-06-08 | End: 2021-06-08 | Stop reason: HOSPADM

## 2021-06-08 RX ORDER — DEXAMETHASONE SODIUM PHOSPHATE 4 MG/ML
INJECTION, SOLUTION INTRA-ARTICULAR; INTRALESIONAL; INTRAMUSCULAR; INTRAVENOUS; SOFT TISSUE AS NEEDED
Status: DISCONTINUED | OUTPATIENT
Start: 2021-06-08 | End: 2021-06-08 | Stop reason: HOSPADM

## 2021-06-08 RX ORDER — SODIUM CHLORIDE 0.9 % (FLUSH) 0.9 %
5-40 SYRINGE (ML) INJECTION EVERY 8 HOURS
Status: DISCONTINUED | OUTPATIENT
Start: 2021-06-08 | End: 2021-06-08 | Stop reason: HOSPADM

## 2021-06-08 RX ORDER — AMOXICILLIN 250 MG
1 CAPSULE ORAL 2 TIMES DAILY
Status: DISCONTINUED | OUTPATIENT
Start: 2021-06-09 | End: 2021-06-09 | Stop reason: HOSPADM

## 2021-06-08 RX ORDER — SODIUM CHLORIDE 0.9 % (FLUSH) 0.9 %
5-40 SYRINGE (ML) INJECTION AS NEEDED
Status: DISCONTINUED | OUTPATIENT
Start: 2021-06-08 | End: 2021-06-09 | Stop reason: HOSPADM

## 2021-06-08 RX ORDER — SODIUM CHLORIDE 9 MG/ML
125 INJECTION, SOLUTION INTRAVENOUS CONTINUOUS
Status: DISCONTINUED | OUTPATIENT
Start: 2021-06-08 | End: 2021-06-09 | Stop reason: HOSPADM

## 2021-06-08 RX ORDER — NALOXONE HYDROCHLORIDE 0.4 MG/ML
0.4 INJECTION, SOLUTION INTRAMUSCULAR; INTRAVENOUS; SUBCUTANEOUS AS NEEDED
Status: DISCONTINUED | OUTPATIENT
Start: 2021-06-08 | End: 2021-06-09 | Stop reason: HOSPADM

## 2021-06-08 RX ORDER — MIDAZOLAM HYDROCHLORIDE 1 MG/ML
INJECTION, SOLUTION INTRAMUSCULAR; INTRAVENOUS AS NEEDED
Status: DISCONTINUED | OUTPATIENT
Start: 2021-06-08 | End: 2021-06-08 | Stop reason: HOSPADM

## 2021-06-08 RX ORDER — HYDROMORPHONE HYDROCHLORIDE 1 MG/ML
.25-1 INJECTION, SOLUTION INTRAMUSCULAR; INTRAVENOUS; SUBCUTANEOUS
Status: DISCONTINUED | OUTPATIENT
Start: 2021-06-08 | End: 2021-06-08 | Stop reason: HOSPADM

## 2021-06-08 RX ORDER — MAGNESIUM SULFATE 100 %
4 CRYSTALS MISCELLANEOUS AS NEEDED
Status: DISCONTINUED | OUTPATIENT
Start: 2021-06-08 | End: 2021-06-09 | Stop reason: HOSPADM

## 2021-06-08 RX ORDER — ROCURONIUM BROMIDE 10 MG/ML
INJECTION, SOLUTION INTRAVENOUS AS NEEDED
Status: DISCONTINUED | OUTPATIENT
Start: 2021-06-08 | End: 2021-06-08 | Stop reason: HOSPADM

## 2021-06-08 RX ORDER — HYDROMORPHONE HYDROCHLORIDE 1 MG/ML
0.5 INJECTION, SOLUTION INTRAMUSCULAR; INTRAVENOUS; SUBCUTANEOUS
Status: DISCONTINUED | OUTPATIENT
Start: 2021-06-08 | End: 2021-06-09 | Stop reason: HOSPADM

## 2021-06-08 RX ORDER — ONDANSETRON 2 MG/ML
INJECTION INTRAMUSCULAR; INTRAVENOUS AS NEEDED
Status: DISCONTINUED | OUTPATIENT
Start: 2021-06-08 | End: 2021-06-08 | Stop reason: HOSPADM

## 2021-06-08 RX ORDER — SODIUM CHLORIDE, SODIUM LACTATE, POTASSIUM CHLORIDE, CALCIUM CHLORIDE 600; 310; 30; 20 MG/100ML; MG/100ML; MG/100ML; MG/100ML
100 INJECTION, SOLUTION INTRAVENOUS CONTINUOUS
Status: DISCONTINUED | OUTPATIENT
Start: 2021-06-08 | End: 2021-06-08 | Stop reason: HOSPADM

## 2021-06-08 RX ORDER — INSULIN GLARGINE 100 [IU]/ML
50 INJECTION, SOLUTION SUBCUTANEOUS
Status: DISCONTINUED | OUTPATIENT
Start: 2021-06-08 | End: 2021-06-09 | Stop reason: HOSPADM

## 2021-06-08 RX ORDER — ROPINIROLE 1 MG/1
1 TABLET, FILM COATED ORAL
Status: DISCONTINUED | OUTPATIENT
Start: 2021-06-08 | End: 2021-06-09 | Stop reason: HOSPADM

## 2021-06-08 RX ORDER — POLYETHYLENE GLYCOL 3350 17 G/17G
17 POWDER, FOR SOLUTION ORAL DAILY
Status: DISCONTINUED | OUTPATIENT
Start: 2021-06-09 | End: 2021-06-09 | Stop reason: HOSPADM

## 2021-06-08 RX ORDER — OXYCODONE HYDROCHLORIDE 5 MG/1
10 TABLET ORAL
Status: DISCONTINUED | OUTPATIENT
Start: 2021-06-08 | End: 2021-06-09 | Stop reason: HOSPADM

## 2021-06-08 RX ORDER — FAMOTIDINE 20 MG/1
20 TABLET, FILM COATED ORAL 2 TIMES DAILY
Status: DISCONTINUED | OUTPATIENT
Start: 2021-06-08 | End: 2021-06-09 | Stop reason: HOSPADM

## 2021-06-08 RX ORDER — CHOLECALCIFEROL TAB 125 MCG (5000 UNIT) 125 MCG
10000 TAB ORAL
Status: DISCONTINUED | OUTPATIENT
Start: 2021-06-08 | End: 2021-06-09 | Stop reason: HOSPADM

## 2021-06-08 RX ORDER — FENTANYL CITRATE 50 UG/ML
INJECTION, SOLUTION INTRAMUSCULAR; INTRAVENOUS AS NEEDED
Status: DISCONTINUED | OUTPATIENT
Start: 2021-06-08 | End: 2021-06-08 | Stop reason: HOSPADM

## 2021-06-08 RX ORDER — BUMETANIDE 1 MG/1
1 TABLET ORAL
Status: DISCONTINUED | OUTPATIENT
Start: 2021-06-08 | End: 2021-06-09 | Stop reason: HOSPADM

## 2021-06-08 RX ORDER — LIDOCAINE HYDROCHLORIDE 10 MG/ML
0.1 INJECTION, SOLUTION EPIDURAL; INFILTRATION; INTRACAUDAL; PERINEURAL AS NEEDED
Status: DISCONTINUED | OUTPATIENT
Start: 2021-06-08 | End: 2021-06-08 | Stop reason: HOSPADM

## 2021-06-08 RX ORDER — DULOXETIN HYDROCHLORIDE 30 MG/1
120 CAPSULE, DELAYED RELEASE ORAL
Status: DISCONTINUED | OUTPATIENT
Start: 2021-06-08 | End: 2021-06-09 | Stop reason: HOSPADM

## 2021-06-08 RX ORDER — EPHEDRINE SULFATE/0.9% NACL/PF 50 MG/5 ML
SYRINGE (ML) INTRAVENOUS AS NEEDED
Status: DISCONTINUED | OUTPATIENT
Start: 2021-06-08 | End: 2021-06-08 | Stop reason: HOSPADM

## 2021-06-08 RX ORDER — INSULIN LISPRO 100 [IU]/ML
INJECTION, SOLUTION INTRAVENOUS; SUBCUTANEOUS
Status: DISCONTINUED | OUTPATIENT
Start: 2021-06-08 | End: 2021-06-09 | Stop reason: HOSPADM

## 2021-06-08 RX ORDER — PROPOFOL 10 MG/ML
INJECTION, EMULSION INTRAVENOUS
Status: DISCONTINUED | OUTPATIENT
Start: 2021-06-08 | End: 2021-06-08 | Stop reason: HOSPADM

## 2021-06-08 RX ORDER — LABETALOL HCL 20 MG/4 ML
20 SYRINGE (ML) INTRAVENOUS
Status: ACTIVE | OUTPATIENT
Start: 2021-06-08 | End: 2021-06-09

## 2021-06-08 RX ORDER — DIPHENHYDRAMINE HYDROCHLORIDE 50 MG/ML
12.5 INJECTION, SOLUTION INTRAMUSCULAR; INTRAVENOUS
Status: DISCONTINUED | OUTPATIENT
Start: 2021-06-08 | End: 2021-06-09 | Stop reason: HOSPADM

## 2021-06-08 RX ORDER — DEXTROSE 50 % IN WATER (D50W) INTRAVENOUS SYRINGE
25-50 AS NEEDED
Status: DISCONTINUED | OUTPATIENT
Start: 2021-06-08 | End: 2021-06-09 | Stop reason: HOSPADM

## 2021-06-08 RX ORDER — ONDANSETRON 2 MG/ML
4 INJECTION INTRAMUSCULAR; INTRAVENOUS
Status: DISCONTINUED | OUTPATIENT
Start: 2021-06-08 | End: 2021-06-09 | Stop reason: HOSPADM

## 2021-06-08 RX ORDER — ROSUVASTATIN CALCIUM 10 MG/1
10 TABLET, COATED ORAL
Status: DISCONTINUED | OUTPATIENT
Start: 2021-06-08 | End: 2021-06-09 | Stop reason: HOSPADM

## 2021-06-08 RX ORDER — SODIUM CHLORIDE 0.9 % (FLUSH) 0.9 %
5-40 SYRINGE (ML) INJECTION EVERY 8 HOURS
Status: DISCONTINUED | OUTPATIENT
Start: 2021-06-08 | End: 2021-06-09 | Stop reason: HOSPADM

## 2021-06-08 RX ORDER — ACETAMINOPHEN 325 MG/1
650 TABLET ORAL
Status: DISCONTINUED | OUTPATIENT
Start: 2021-06-08 | End: 2021-06-09 | Stop reason: HOSPADM

## 2021-06-08 RX ORDER — SODIUM CHLORIDE 9 MG/ML
25 INJECTION, SOLUTION INTRAVENOUS AS NEEDED
Status: DISCONTINUED | OUTPATIENT
Start: 2021-06-08 | End: 2021-06-08 | Stop reason: HOSPADM

## 2021-06-08 RX ORDER — FLUMAZENIL 0.1 MG/ML
0.2 INJECTION INTRAVENOUS
Status: DISCONTINUED | OUTPATIENT
Start: 2021-06-08 | End: 2021-06-08 | Stop reason: HOSPADM

## 2021-06-08 RX ORDER — HYDROMORPHONE HCL/0.9% NACL/PF 0.5 MG/ML
PLASTIC BAG, INJECTION (ML) INTRAVENOUS
Status: DISCONTINUED | OUTPATIENT
Start: 2021-06-08 | End: 2021-06-09

## 2021-06-08 RX ORDER — OXYCODONE HYDROCHLORIDE 5 MG/1
5 TABLET ORAL
Status: DISCONTINUED | OUTPATIENT
Start: 2021-06-08 | End: 2021-06-09 | Stop reason: HOSPADM

## 2021-06-08 RX ORDER — SODIUM CHLORIDE 0.9 % (FLUSH) 0.9 %
5-40 SYRINGE (ML) INJECTION AS NEEDED
Status: DISCONTINUED | OUTPATIENT
Start: 2021-06-08 | End: 2021-06-08 | Stop reason: HOSPADM

## 2021-06-08 RX ADMIN — SODIUM CHLORIDE, POTASSIUM CHLORIDE, SODIUM LACTATE AND CALCIUM CHLORIDE 125 ML/HR: 600; 310; 30; 20 INJECTION, SOLUTION INTRAVENOUS at 12:24

## 2021-06-08 RX ADMIN — FENTANYL CITRATE 50 MCG: 50 INJECTION, SOLUTION INTRAMUSCULAR; INTRAVENOUS at 15:41

## 2021-06-08 RX ADMIN — FENTANYL CITRATE 50 MCG: 50 INJECTION, SOLUTION INTRAMUSCULAR; INTRAVENOUS at 17:19

## 2021-06-08 RX ADMIN — PROPOFOL 170 MG: 10 INJECTION, EMULSION INTRAVENOUS at 14:49

## 2021-06-08 RX ADMIN — Medication 15 MG: at 15:06

## 2021-06-08 RX ADMIN — WATER 2 G: 1 INJECTION INTRAMUSCULAR; INTRAVENOUS; SUBCUTANEOUS at 21:17

## 2021-06-08 RX ADMIN — FENTANYL CITRATE 50 MCG: 50 INJECTION, SOLUTION INTRAMUSCULAR; INTRAVENOUS at 15:25

## 2021-06-08 RX ADMIN — FAMOTIDINE 20 MG: 20 TABLET ORAL at 23:57

## 2021-06-08 RX ADMIN — ROPINIROLE HYDROCHLORIDE 1 MG: 1 TABLET, FILM COATED ORAL at 21:21

## 2021-06-08 RX ADMIN — SODIUM CHLORIDE 125 ML/HR: 9 INJECTION, SOLUTION INTRAVENOUS at 18:12

## 2021-06-08 RX ADMIN — FENTANYL CITRATE 25 MCG: 50 INJECTION, SOLUTION INTRAMUSCULAR; INTRAVENOUS at 16:59

## 2021-06-08 RX ADMIN — DULOXETINE HYDROCHLORIDE 120 MG: 30 CAPSULE, DELAYED RELEASE ORAL at 23:56

## 2021-06-08 RX ADMIN — Medication 10 ML: at 23:58

## 2021-06-08 RX ADMIN — PROPOFOL 50 MCG/KG/MIN: 10 INJECTION, EMULSION INTRAVENOUS at 15:21

## 2021-06-08 RX ADMIN — HYDROMORPHONE HYDROCHLORIDE 0.5 MG: 1 INJECTION, SOLUTION INTRAMUSCULAR; INTRAVENOUS; SUBCUTANEOUS at 18:09

## 2021-06-08 RX ADMIN — CARVEDILOL 6.25 MG: 6.25 TABLET, FILM COATED ORAL at 21:18

## 2021-06-08 RX ADMIN — CEFAZOLIN SODIUM 2 G: 1 POWDER, FOR SOLUTION INTRAMUSCULAR; INTRAVENOUS at 14:41

## 2021-06-08 RX ADMIN — Medication 5 MG: at 15:03

## 2021-06-08 RX ADMIN — BUMETANIDE 1 MG: 1 TABLET ORAL at 23:56

## 2021-06-08 RX ADMIN — SODIUM CHLORIDE, POTASSIUM CHLORIDE, SODIUM LACTATE AND CALCIUM CHLORIDE: 600; 310; 30; 20 INJECTION, SOLUTION INTRAVENOUS at 15:20

## 2021-06-08 RX ADMIN — HYDROMORPHONE HYDROCHLORIDE 0.5 MG: 1 INJECTION, SOLUTION INTRAMUSCULAR; INTRAVENOUS; SUBCUTANEOUS at 17:35

## 2021-06-08 RX ADMIN — Medication: at 18:22

## 2021-06-08 RX ADMIN — FENTANYL CITRATE 50 MCG: 50 INJECTION, SOLUTION INTRAMUSCULAR; INTRAVENOUS at 15:31

## 2021-06-08 RX ADMIN — DEXAMETHASONE SODIUM PHOSPHATE 8 MG: 4 INJECTION, SOLUTION INTRAMUSCULAR; INTRAVENOUS at 14:22

## 2021-06-08 RX ADMIN — Medication 10 MG: at 15:19

## 2021-06-08 RX ADMIN — ROCURONIUM BROMIDE 5 MG: 10 INJECTION INTRAVENOUS at 15:03

## 2021-06-08 RX ADMIN — FENTANYL CITRATE 50 MCG: 50 INJECTION, SOLUTION INTRAMUSCULAR; INTRAVENOUS at 14:47

## 2021-06-08 RX ADMIN — PROPOFOL 21 MG: 10 INJECTION, EMULSION INTRAVENOUS at 15:31

## 2021-06-08 RX ADMIN — FENTANYL CITRATE 100 MCG: 50 INJECTION, SOLUTION INTRAMUSCULAR; INTRAVENOUS at 14:48

## 2021-06-08 RX ADMIN — ONDANSETRON HYDROCHLORIDE 4 MG: 2 SOLUTION INTRAMUSCULAR; INTRAVENOUS at 16:44

## 2021-06-08 RX ADMIN — FENTANYL CITRATE 25 MCG: 50 INJECTION, SOLUTION INTRAMUSCULAR; INTRAVENOUS at 17:09

## 2021-06-08 RX ADMIN — PROPOFOL 30 MG: 10 INJECTION, EMULSION INTRAVENOUS at 15:22

## 2021-06-08 RX ADMIN — FENTANYL CITRATE 50 MCG: 50 INJECTION, SOLUTION INTRAMUSCULAR; INTRAVENOUS at 14:43

## 2021-06-08 RX ADMIN — ROSUVASTATIN CALCIUM 10 MG: 10 TABLET, COATED ORAL at 21:21

## 2021-06-08 RX ADMIN — FENTANYL CITRATE 50 MCG: 50 INJECTION, SOLUTION INTRAMUSCULAR; INTRAVENOUS at 14:41

## 2021-06-08 RX ADMIN — Medication 10 MG: at 15:14

## 2021-06-08 RX ADMIN — MIDAZOLAM HYDROCHLORIDE 2 MG: 2 INJECTION, SOLUTION INTRAMUSCULAR; INTRAVENOUS at 14:41

## 2021-06-08 RX ADMIN — ROCURONIUM BROMIDE 35 MG: 10 INJECTION INTRAVENOUS at 14:49

## 2021-06-08 RX ADMIN — Medication 10000 UNITS: at 23:56

## 2021-06-08 NOTE — OP NOTES
[address] [facility]  [address], [address] [facility]    OPERATIVE REPORT      NAME: Fito Hardy    AGE: 73 y.o. YOB: 1955    MEDICAL RECORD NUMBER: [id number]    DATE OF SURGERY: 6/8/2021    OPERATIVE REPORT     PREOPERATIVE DIAGNOSIS: Cervical stenosis     POSTOPERATIVE DIAGNOSIS: Cervical stenosis    OPERATIVE PROCEDURE: C5 to C7 anterior cervical diskectomy and fusion with instrumentation and application of interbody spacer at C5-C6 and C6-C7. SURGEON: Sarmad Dorantes MD     ASSISTANT: DUNG Bustillos    Specimens - no    ANESTHESIA: General    COMPLICATIONS: None    ESTIMATED BLOOD LOSS: 60 cc    INSTRUMENTATION: K2M plate, Seaspine spacer    NEUROMONITORING: SSEPs and spontaneous EMGs    INDICATION FOR PROCEDURE: The patient is a very pleasant 73 y.o. male with cervical stenosis. The patient elected to proceed with operative intervention. He was aware of the risks, benefits, and alternatives. He provided informed consent. PROCEDURE: The patient was identified in the preoperative holding area. The anterior cervical spine was marked by me. He was transferred to the operating room where general anesthesia was given. He was also given perioperative ancef antibiotics. The patient was placed supine on the operating room table. All bony prominences were well-padded. The shoulders were taped. The anterior cervical spine was prepped and draped in the usual standard fashion. We performed a surgical time-out. I made a skin incision on the left side. It was transverse. I exposed the anterior cervical spine. I placed a needle into the disk space to verify our levels. I exposed the disc spaces with electrocautery from uncus to uncus. I brought in the operating room microscope. I performed a diskectomy at C5-C6. I decompressed the spinal cord and nerve roots bilaterally. I prepared the endplates to bleeding bone. We had good hemostasis.  I performed trial sizing. I placed a spacer into C5-C6 with the appropriate amount of tension and alignment. I performed an identical procedure at C6-C7. The endplates were prepared to bleeding bone. The spinal cord and nerve roots were decompressed. I placed a spacer into C6-C7 with the appropriate amount of tension and alignment. The spacers had allograft. I then placed an anterior cervical plate into C5, C6, and C7. The screws were locked to the plate according to the manufacture's specification. We had good hemostasis. I copiously irrigated the entire wound. I placed a deep drain. The wound was closed with 3-0 Vicryl and 4-0 Monocryl. A sterile dressing was applied. The patient was extubated and transferred to the recovery room in good medical condition. The PA assisted with retraction and wound closure    I, Dr. Laisha Lopez, performed the above procedures.      Laisha Lopez MD  6/8/2021

## 2021-06-08 NOTE — H&P
Date of Surgery Update:  Jennifer Blanton was seen and examined. History and physical has been reviewed. The patient has been examined.  There have been no significant clinical changes since the completion of the originally dated History and Physical.    Signed By: Flora Carrillo MD     June 8, 2021 1:20 PM

## 2021-06-08 NOTE — ANESTHESIA POSTPROCEDURE EVALUATION
Procedure(s):  C5-C7 ANTERIOR CERVICAL DISCECTOMY AND FUSION WITH INSTRUMENTATION. general    Anesthesia Post Evaluation      Multimodal analgesia: multimodal analgesia used between 6 hours prior to anesthesia start to PACU discharge  Patient location during evaluation: PACU  Patient participation: complete - patient participated  Level of consciousness: awake and alert  Pain management: adequate  Airway patency: patent  Anesthetic complications: no  Cardiovascular status: hypertensive  Respiratory status: acceptable  Hydration status: acceptable  Post anesthesia nausea and vomiting:  none  Final Post Anesthesia Temperature Assessment:  Normothermia (36.0-37.5 degrees C)      INITIAL Post-op Vital signs:   Vitals Value Taken Time   /72 06/08/21 1810   Temp 36.7 °C (98 °F) 06/08/21 1725   Pulse 86 06/08/21 1813   Resp 15 06/08/21 1813   SpO2 99 % 06/08/21 1813   Vitals shown include unvalidated device data.

## 2021-06-08 NOTE — ANESTHESIA PREPROCEDURE EVALUATION
Anesthetic History   No history of anesthetic complications            Review of Systems / Medical History  Patient summary reviewed and pertinent labs reviewed    Pulmonary          Smoker    Pertinent negatives: No COPD, asthma and sleep apnea  Comments: No issues with sleep apnea since >50lb weight loss   Neuro/Psych         Psychiatric history  Pertinent negatives: No CVA   Cardiovascular  Within defined limits  Hypertension            Pertinent negatives: No past MI  Exercise tolerance: >4 METS  Comments: P/w SOB in and CHF s/s in 2015, TTE demonstrated EF of 15% and severe systolic dysfunction.  Most recent TTE in 10/2021 demonstrated normal EF of 90% and mild systolic LV dysfunction    Patient has ICD (placed 5 yrs ago) that has never fired, battery unit located in L lower flank    GI/Hepatic/Renal  Within defined limits           Pertinent negatives: No GERD   Endo/Other    Diabetes: poorly controlled, type 2         Other Findings            Physical Exam    Airway  Mallampati: II  TM Distance: 4 - 6 cm  Neck ROM: normal range of motion   Mouth opening: Normal     Cardiovascular    Rhythm: regular  Rate: normal         Dental  No notable dental hx       Pulmonary  Breath sounds clear to auscultation               Abdominal         Other Findings            Anesthetic Plan    ASA: 2  Anesthesia type: general            Anesthetic plan and risks discussed with: Patient

## 2021-06-09 VITALS
RESPIRATION RATE: 18 BRPM | HEART RATE: 76 BPM | WEIGHT: 234.4 LBS | TEMPERATURE: 97.6 F | BODY MASS INDEX: 32.81 KG/M2 | OXYGEN SATURATION: 97 % | SYSTOLIC BLOOD PRESSURE: 142 MMHG | HEIGHT: 71 IN | DIASTOLIC BLOOD PRESSURE: 65 MMHG

## 2021-06-09 LAB
ANION GAP SERPL CALC-SCNC: 10 MMOL/L (ref 5–15)
BUN SERPL-MCNC: 22 MG/DL (ref 6–20)
BUN/CREAT SERPL: 25 (ref 12–20)
CALCIUM SERPL-MCNC: 7.8 MG/DL (ref 8.5–10.1)
CHLORIDE SERPL-SCNC: 109 MMOL/L (ref 97–108)
CO2 SERPL-SCNC: 24 MMOL/L (ref 21–32)
CREAT SERPL-MCNC: 0.89 MG/DL (ref 0.7–1.3)
GLUCOSE BLD STRIP.AUTO-MCNC: 128 MG/DL (ref 65–117)
GLUCOSE BLD STRIP.AUTO-MCNC: 134 MG/DL (ref 65–117)
GLUCOSE BLD STRIP.AUTO-MCNC: 202 MG/DL (ref 65–117)
GLUCOSE SERPL-MCNC: 147 MG/DL (ref 65–100)
HGB BLD-MCNC: 13 G/DL (ref 12.1–17)
POTASSIUM SERPL-SCNC: 4.3 MMOL/L (ref 3.5–5.1)
SERVICE CMNT-IMP: ABNORMAL
SODIUM SERPL-SCNC: 143 MMOL/L (ref 136–145)

## 2021-06-09 PROCEDURE — 94760 N-INVAS EAR/PLS OXIMETRY 1: CPT

## 2021-06-09 PROCEDURE — 77030040922 HC BLNKT HYPOTHRM STRY -A

## 2021-06-09 PROCEDURE — 74011000250 HC RX REV CODE- 250: Performed by: ORTHOPAEDIC SURGERY

## 2021-06-09 PROCEDURE — 77030040361 HC SLV COMPR DVT MDII -B

## 2021-06-09 PROCEDURE — 36415 COLL VENOUS BLD VENIPUNCTURE: CPT

## 2021-06-09 PROCEDURE — 85018 HEMOGLOBIN: CPT

## 2021-06-09 PROCEDURE — 74011250636 HC RX REV CODE- 250/636: Performed by: ORTHOPAEDIC SURGERY

## 2021-06-09 PROCEDURE — 77010033678 HC OXYGEN DAILY

## 2021-06-09 PROCEDURE — 74011250637 HC RX REV CODE- 250/637: Performed by: ORTHOPAEDIC SURGERY

## 2021-06-09 PROCEDURE — 99233 SBSQ HOSP IP/OBS HIGH 50: CPT | Performed by: CLINICAL NURSE SPECIALIST

## 2021-06-09 PROCEDURE — 82962 GLUCOSE BLOOD TEST: CPT

## 2021-06-09 PROCEDURE — 80048 BASIC METABOLIC PNL TOTAL CA: CPT

## 2021-06-09 PROCEDURE — 99218 HC RM OBSERVATION: CPT

## 2021-06-09 RX ORDER — AMOXICILLIN 250 MG
1 CAPSULE ORAL 2 TIMES DAILY
Qty: 60 TABLET | Refills: 0 | Status: SHIPPED | OUTPATIENT
Start: 2021-06-09 | End: 2021-08-31

## 2021-06-09 RX ORDER — OXYCODONE HYDROCHLORIDE 5 MG/1
5-10 TABLET ORAL
Qty: 50 TABLET | Refills: 0 | Status: SHIPPED | OUTPATIENT
Start: 2021-06-09 | End: 2021-06-16

## 2021-06-09 RX ADMIN — OXYCODONE 5 MG: 5 TABLET ORAL at 06:07

## 2021-06-09 RX ADMIN — OXYCODONE 10 MG: 5 TABLET ORAL at 11:49

## 2021-06-09 RX ADMIN — SODIUM CHLORIDE 125 ML/HR: 9 INJECTION, SOLUTION INTRAVENOUS at 02:28

## 2021-06-09 RX ADMIN — FAMOTIDINE 20 MG: 20 TABLET ORAL at 09:15

## 2021-06-09 RX ADMIN — WATER 2 G: 1 INJECTION INTRAMUSCULAR; INTRAVENOUS; SUBCUTANEOUS at 06:01

## 2021-06-09 RX ADMIN — CARVEDILOL 6.25 MG: 6.25 TABLET, FILM COATED ORAL at 09:15

## 2021-06-09 RX ADMIN — Medication 10 ML: at 06:00

## 2021-06-09 NOTE — PROGRESS NOTES
6/9/2021  10:37 AM  CM completed assessment w/ pt in person, CM wore mask and goggles at all times  Charted demographics verified, pt lives w/ wife in 2 story home, there is a ramp to enter, 13 interior steps to bed/bath. At base;ine pt is ambulatory, iADLs, drives, wife can assist pt at home    Reason for Admission:  Cervical spinal stenosis,    POD #1 C5-C7 ANTERIOR CERVICAL DISCECTOMY AND FUSION WITH INSTRUMENTATION                   RUR Score:     N/A pt is OBS                Plan for utilizing home health:    No history, pt is not homebound  DME: None  Rx: Irena Duffy, [facility] [address] [facility] [address] pt reports no difficulty affording his medications      PCP: First and Last name:  Denise Herndon MD     Name of Practice:    Are you a current patient: Yes/No:    Approximate date of last visit:    Can you participate in a virtual visit with your PCP:                     Current Advanced Directive/Advance Care Plan: Prior  HCDM: Corena Means [phone]    Healthcare Decision Maker:   Click here to complete [employer] including selection of the Healthcare Decision Maker Relationship (ie \"Primary\")                             Transition of Care Plan:                    RUR N/A pt is OBS  LOS 1 Day  1. Hospital admission for surgical management  2. CM to follow through for treatment/response  3. CM educated pt on OBS status, State OBS letter given, signed, copy to pt  4. DC order for today, if pt tolerates lunch  5. Outpatient f/u ortho  6. Wife Madelaine Ramos to transport    Care Management Interventions  PCP Verified by CM:  Yes Samir Connor MD; last visit > 30 days)  Palliative Care Criteria Met (RRAT>21 & CHF Dx)?: No  Mode of Transport at Discharge: Self (family)  Physical Therapy Consult: No  Occupational Therapy Consult: No  Speech Therapy Consult: No  Current Support Network: Lives with Spouse, Own Home (pt lives w/ spouse in pvt residence, at baseline pt is ambulatory, iADLs,  drives, wife can asssit)  Confirm Follow Up Transport: Family  Discharge Location  Discharge Placement: Home with outpatient services  Claudeen Nation, BS

## 2021-06-09 NOTE — ROUTINE PROCESS
Bedside and Verbal shift change report given to Dorothy(oncoming nurse) by Cindy Menendez (offgoing nurse). Report included the following information SBAR, Kardex, Intake/Output, MAR and Recent Results.

## 2021-06-09 NOTE — PROGRESS NOTES
ORTHOPAEDIC CERVICAL FUSION PROGRESS NOTE    NAME:     Missy Melton   :       1955   MRN:       [id number]   DATE:      2021    POD:              1 Day Post-Op  S/P:              Procedure(s):  C5-C7 ANTERIOR CERVICAL DISCECTOMY AND FUSION WITH INSTRUMENTATION    SUBJECTIVE:  C/O sore throat   No arm pain or numbness  Denies nausea/vomiting, headache, chest pain or shortness of breath  Pain controlled    Recent Labs     21  0230   HGB 13.0      K 4.3   *   CO2 24   BUN 22*   CREA 0.89   *     Patient Vitals for the past 12 hrs:   BP Temp Pulse Resp SpO2   21 0344 (!) 151/80 97.7 °F (36.5 °C) 83 16 96 %   21 2240 (!) 153/79 97.4 °F (36.3 °C) 92 16 98 %   21 2140 (!) 166/84 97.7 °F (36.5 °C) 90 15 96 %   21 (!) 164/85  92     21 (!) 164/85  91 14 99 %   21 (!) 163/73  88 15 100 %   21 (!) 161/80  89 14 98 %   21 (!) 152/56  86 15 100 %   21 (!) 155/75  85 10 99 %       Exam:  VISTA collar inplace / intact  Dressings clean and dry  Positive strength/ROM bilat upper ext.   Neuro intact to sensation  BL UEs NVID    PLAN:  Continue PO pain medications as needed  Chloraseptic spray at bedside  Advance diet as tolerated  Out of bed w/ assist  Likely D/C to home today      Grant Farris, [address]  Orthopaedic Surgery  Physician Assistant to Dr. Grazyna Morales

## 2021-06-09 NOTE — DIABETES MGMT
[facility] SPECIALIST CONSULT NOTE    Presentation   Stephenie Singh is a 73 y.o. male admitted 6/8/21 after C5-C7 anterior diskectomy and fusion. HX:   Past Medical History:   Diagnosis Date    Anxiety and depression     Carbuncle and furuncle of neck 2015    CHF (congestive heart failure) (HCC)     COVID-19 vaccine series completed     [facility]    Hypertension     ICD (implantable cardioverter-defibrillator) in place     Infection of skin due to methicillin resistant Staphylococcus aureus (MRSA) 2015    Restless leg syndrome     Streptococcal bacteremia     elbow    Type 2 diabetes mellitus (HCC)     Unspecified sleep apnea     lost weight       Current clinical course has been uncomplicated. Diabetes: Patient has known Type 2 diabetes, diagnosed approximately 20 years ago. Home diabetes medication regimen is Tresiba 50 units at bedtime and Jardiance 25mg daily. Patient reports was on Ozempic but has been off of the medication (due to needed refills and [place]CP was [place]) for approximately 6 weeks. Patient reports that he will begin Ozempic again this Sunday 6/13/21. Admission  and A1c 7.2% (5/26/21) indicate good diabetes control.      Consulted by Provider for advanced diabetes nursing assessment and care, specifically related to     [x] Inpatient management strategy      Diabetes-related medical history  Acute complications  denies  Neurological complications  denies  Microvascular disease  denies  Macrovascular disease  denies  Other associated conditions     Depression    Diabetes medication history  Drug class Currently in use Discontinued Never used   Biguanide      DPP-4 inhibitor       Sulfonylurea      Thiazolidinedione      GLP-1 RA Ozempic every Sunday resuming 6/13/21     SGLT-2 inhibitors Jardiance 25mg daily     Basal insulin Tresiba 50 units at bedtime     Bolus insulin      Fixed Dose  Combinations        Subjective Patient laying in bed, alert and oriented. Patient without complaints at this time. Patient reports the following home diabetes self-care practices:  Eating pattern  [x] Breakfast \"small breakfast sandwich from megan that's 23 grams of carbs\"  [x] Lunch  Wrap or salad  [x] Dinner  Salad or meat/vegetable  [x] Snacks \"High protein/low sugar bar or lowest carb cheese crackers\"  Physical activity pattern  Patient denies any specific exercise routine or pattern other than yard work. Monitoring pattern  Patient reports checks BG every morning with FBG . Taking medications pattern  [x] Consistent administration  [x] Affordable    Social determinants of health impacting diabetes self-management practices   Patient denies any issues or concerns at this time. Objective   Physical exam  General Alert, oriented and in no acute distress. Conversant and cooperative. Vital Signs   Visit Vitals  BP (!) 102/51 (BP 1 Location: Left upper arm, BP Patient Position: At rest)   Pulse 85   Temp 97.7 °F (36.5 °C)   Resp 18   Ht 5' 10.98\" (1.803 m)   Wt 106.3 kg (234 lb 6.4 oz)   SpO2 99%   BMI 32.71 kg/m²     Skin  Warm and dry. No Acanthosis noted along neckline. No lipohypertrophy or lipoatrophy noted at injection sites   Heart   Regular rate and rhythm.  No murmurs, rubs or gallops  Lungs  Clear to auscultation without rales or rhonchi  Extremities No foot wounds         Laboratory  Lab Results   Component Value Date/Time    Hemoglobin A1c 7.2 (H) 05/26/2021 09:09 AM     No results found for: LDL, LDLC, DLDLP  Lab Results   Component Value Date/Time    Creatinine (POC) 1.1 07/01/2014 05:51 PM    Creatinine 0.89 06/09/2021 02:30 AM     Lab Results   Component Value Date/Time    Sodium 143 06/09/2021 02:30 AM    Potassium 4.3 06/09/2021 02:30 AM    Chloride 109 (H) 06/09/2021 02:30 AM    CO2 24 06/09/2021 02:30 AM    Anion gap 10 06/09/2021 02:30 AM    Glucose 147 (H) 06/09/2021 02:30 AM    BUN 22 (H) 06/09/2021 02:30 AM    Creatinine 0.89 06/09/2021 02:30 AM    BUN/Creatinine ratio 25 (H) 06/09/2021 02:30 AM    GFR est AA >60 06/09/2021 02:30 AM    GFR est non-AA >60 06/09/2021 02:30 AM    Calcium 7.8 (L) 06/09/2021 02:30 AM    Bilirubin, total 0.7 05/26/2021 09:09 AM    Alk. phosphatase 141 (H) 05/26/2021 09:09 AM    Protein, total 6.9 05/26/2021 09:09 AM    Albumin 3.9 05/26/2021 09:09 AM    Globulin 3.0 05/26/2021 09:09 AM    A-G Ratio 1.3 05/26/2021 09:09 AM    ALT (SGPT) 24 05/26/2021 09:09 AM     Lab Results   Component Value Date/Time    ALT (SGPT) 24 05/26/2021 09:09 AM         Factors affecting BG management  Factor Dose Comments   Nutrition:  Carb-controlled meals   60 grams/meal    Drugs:  Vasopressor load  Steroids    HIV   Epogen  Blood transfusion(s)  Other: n/a   n/a  Decadron 8 mg x 1 during surgery  n/a  n/a  n/a           A1cs inaccurate  A1cs inaccurate   Pain 0/10    Infection Ancef x 3 doses Surgical prophylaxis   Other: n/a n/a      Blood glucose pattern          Evaluation   This 73year old male, with Type 2 diabetes, did achieve diabetes control prior to admission (PTA), as evidenced by admission BG of 109 and A1c of 7.2%. Based on assessment of diabetes self-care practices, interventions that warrant action while hospitalized include:     [x] Being Active       The patient would not also benefit from diabetes self-management education and support (DSMES) after discharge. During this hospitalization, the patient has achieved inpatient blood glucose target of 100-180mg/dl. BG's have ranged 109-147 over the past 24 hours. Factors that have played a role include:    [x] Glucocorticoid use - x 1 during surgery      Basal insulin is in use. Lantus 50 units at bedtime currently ordered, patient refused dose 6/8/21. Bolus insulin isn't in use. Patient is eating meals. Corrective insulin is in use. Patient has not required any corrective insulin over the past 24 hours.       To optimize BG control and support a positive health outcome, would utilize the Subcutaneous Insulin Order set (0297). Impression: It is suspected that the current regimen will be sufficient to maintain glycemic control. It is also suspected that having patient resume home diabetes treatment regimen at discharge will continue to provide good BG control at home. Assessment and Plan   Nursing Diagnosis Risk for unstable blood glucose pattern   Nursing Intervention Domain 8581 Decision-making Support   Nursing Interventions Examined current inpatient diabetes control   Explored factors facilitating and impeding inpatient management  Identified self-management practices impeding diabetes control  Explored corrective strategies with patient and responsible inpatient provider   Informed patient of rational for insulin strategy while hospitalized       Recommendations   Recommend:    [x] Use of Subcutaneous Insulin Order set (1935)  Insulin Use Recommendation   Basal                                      (Based on weight, BMI & GFR) Addresses basic metabolic needs Continue Lantus 50 units at bedtime. Nutritional                                      (Based on CHO load) Addresses nutrition interventions If patient experiences pre-prandial BG > 200 mg/dL, start Humalog 6 units with meals. Corrective                                       (Offset gaps in dosing) Useful in adjusting insulin dosing Correctional Scale for Normal Sensitivity    200-249- 2units Humalog  [id number] Humalog  [phone] Humalog  [phone] Humalog  Over 400- 10units Humalog    Do NOT hold for NPO; give in addition to meal time insulin dose. If patient does not eat, -give correction dose only. [x] Discharge Recommendations  Resume home diabetes medications (Tresiba 50 units at bedtime, Jardiance 25mg daily and Ozempic weekly starting Sunday 6/13/21). Billing Code(s)   Total time spent with patient: 35 Minutes.   I personally reviewed medical record, including notes, data and current medications, and examined the patient at bedside before making care recommendations (circumstances permitting).        [x] [id number] IP subsequent hospital care - 35 minutes    Hugh Dawn DNP, APRN-BC, [address] [place] [address], [facility]  Diabetes Clinical Nurse Specialist  [facility]  Access via [employer]

## 2021-06-09 NOTE — PROGRESS NOTES
Ortho Spine F/u      Rounded with Dr. Rustam Phelan this am. Pain controlled. Medically stable. HV output last 8 hours was 20mL. Discussed plan of care with patient and answered all questions. Dr. Kalin Mobley states patient may discharge home after lunch if swallowing without difficulty. Orders placed accordingly.      Isis Nguyen NP

## 2021-06-09 NOTE — PROGRESS NOTES
AVS reviewed with pt and spouse at bedside, verbalize understanding if new medications, follow up appointments, and incisional care. Denies further questions at this time. PIV removed, belongings gathered at bedside.  To be transported via wheelchair to main entrance for d/c

## 2021-06-09 NOTE — PERIOP NOTES
TRANSFER - OUT REPORT:    Verbal report given to RN Ai(name) on Prakash Jainon  being transferred to  for routine post - op       Report consisted of patients Situation, Background, Assessment and   Recommendations(SBAR). Information from the following report(s) SBAR, OR Summary, Procedure Summary, Intake/Output, MAR and Cardiac Rhythm nsr was reviewed with the receiving nurse. Lines:   Peripheral IV 06/08/21 Anterior; Left Forearm (Active)   Site Assessment Clean, dry, & intact 06/08/21 1716   Phlebitis Assessment 0 06/08/21 1716   Infiltration Assessment 0 06/08/21 1716   Dressing Status Clean, dry, & intact; Occlusive 06/08/21 1716   Dressing Type Tape;Transparent 06/08/21 1716   Hub Color/Line Status Pink; Infusing 06/08/21 1716   Action Taken Open ports on tubing capped 06/08/21 1223        Opportunity for questions and clarification was provided.       Patient transported with:   O2 @ 2 liters  Registered Nurse

## 2021-06-09 NOTE — DISCHARGE INSTRUCTIONS
Patient is overdue for CPE/Pap (40 mins). Please assist with scheduling      Tati De La Torre MD  [facility]  Office Phone: [phone]  Neck Surgery Discharge Instructions  Activities   You are going home a well person, be as active as possible. Your only exercise should be walking. Start with short frequent walks and increase your walking distance each day. Start with walking twice a day for 5 minutes and increase your distance each day 2-3 minutes until you reach 25 minutes twice a day. Limit the amount of time you sit to 20-30 minute intervals. Sitting for prolonged periods of time will be uncomfortable for you following your surgery.  Do not lift anything over five pounds, and do not do any bending or straining.  Avoid reaching overhead in this post-operative period   Do not do any neck exercises until you have been instructed by your doctor.  When you are in the bed, you may lay on your back or on either side. Do not lie on your stomach.  Continue using your incentive spirometer regularly for deep breathing exercises   You may resume sexual relations 3-4 weeks after your surgery, depending on how you are feeling. Diet   You may resume your normal diet. If your throat is sore, you may want to eat soft foods for a few days. Be sure to drink plenty of fluids, it is important to keep yourself hydrated. If you begin having trouble swallowing, call the office immediately.  Avoid alcoholic beverages and ABSOLUTELY NO tobacco products. Tobacco products will interfere with your healing. If you continue to use tobacco, you may end up needing another surgery in the future. Medications   Do not take anti-inflammatory medications or aspirin unless instructed by your physician.  Take your pain medication as directed.  Do NOT take additional Tylenol if your prescribed pain medication has acetaminophen in it (Endocet/Percocet, Lortab, Norco).  It is important to have regular bowel movements. Pain medications may cause constipation.   Stool softeners, prune juice, and increasing your water and fiber intake may help in preventing constipation.  Do NOT take laxatives if at all possible except in severe situations. It can results in a vicious cycle of constipation and diarrhea.  Do not be alarmed if you still have some of the same symptoms you had prior to surgery. The nerves often require time to heal after the pressure has been relieved. You may experience pain in your shoulders or between your shoulder blades, which is common after this surgery. The level of pain you experience should improve as your body heals. *** VERY IMPORTANT - PLEASE READ*** Please monitor the supply of your pain medicine closely and if it looks like you're going to run out of pain medicine over the weekend please call the [facility] prior to the weekend to request a refill. The on call physician for nights and weekends CANNOT refill pain medicine. You will either have to wait until Monday morning when the office re-opens or you will have to go to an urgent care/ emergency room if you are in need of pain medicine. Driving   You may not drive or return to work until instructed by your physician. However, you may ride in the car for short periods of time. Neck collar   Wear your neck brace. You may remove it for short breaks, when eating or showering. You must keep the brace on while sleeping and when ambulating. Showering   You may shower in approximately 5 days after your surgery if your incision is not draining.  You may remove your brace during showers.  Do not rub or apply lotion or ointments to the incision site.  Do not use tub baths, swimming pools or Jacuzzis. Caring for your incision   Keep the clear, plastic dressing on until 3 days after surgery. At that point, if the incision is dry and without drainage, you may keep the wound open to air without cover.     You may have steri-strips on your incision (small, white pieces of tape). Do not pull the steri-strips; they will fall off on their own after several days. If you have sutures or staples, they will be removed by home health or when you see your physician.  Do not rub or apply any lotions or ointments to your incision site. Follow Up   Once you are home, call your physicians office to schedule an appointment 2-3 weeks after surgery. Notify your physician if you develop any of the following conditions:   Fever above 101 degrees for 24 hours.  Nausea or vomiting.  Severe headache.  Inability to urinate.  Loss of bowel or bladder control (sudden onset of incontinence).  Changes in sensation in your extremities (numbness, tingling, loss of color).  Severe pain or pain not relieved by medications.  Redness, swelling, or drainage from your incision.  Persistent pain in the chest.    Pain in the calf of either leg.  Increased weakness (if this is greater than before your surgery). If you have any questions, contact your Orthopaedic Surgeons office. OFFICE OF DR. Maegan Galloway   [phone]  OUR NEW ADDRESS IS [id number] [place] [address], [address], [address] [facility], [address] [facility] [address]     * WEAR YOUR BRACE AS ADVISED    * NO DRIVING UNTIL YOU ARE CLEARED TO DO SO BY YOUR SURGEON    * LIMIT LIFTING, BENDING AND TWISTING.  NO LIFTING MORE THAN 5 LBS    * MAKE SURE YOU ARE GETTING GOOD NUTRITION (Lean Protein, Vitamin D AND Calcium)    * DO NOT TAKE ANY NSAIDs FOR THE FIRST 3 MONTHS AFTER SURGERY (such as Advil/Ibuprofen/Motrin, Aleve/Naproxen/Naprosyn, Diclofenac, Celebrex, Meloxicam, Indomethacin, Goody's powder, BC powder etc.)    * NO NICOTINE PRODUCTS    * FULLY READ YOUR DISCHARGE INSTRUCTIONS

## 2021-08-31 ENCOUNTER — APPOINTMENT (OUTPATIENT)
Dept: MRI IMAGING | Age: 66
End: 2021-08-31
Attending: EMERGENCY MEDICINE
Payer: COMMERCIAL

## 2021-08-31 ENCOUNTER — HOSPITAL ENCOUNTER (OUTPATIENT)
Age: 66
Setting detail: OBSERVATION
Discharge: HOME | End: 2021-09-02
Attending: EMERGENCY MEDICINE | Admitting: INTERNAL MEDICINE
Payer: COMMERCIAL

## 2021-08-31 ENCOUNTER — APPOINTMENT (OUTPATIENT)
Dept: CT IMAGING | Age: 66
End: 2021-08-31
Attending: EMERGENCY MEDICINE
Payer: COMMERCIAL

## 2021-08-31 DIAGNOSIS — G81.91 HEMIPARESIS OF RIGHT DOMINANT SIDE, UNSPECIFIED HEMIPARESIS ETIOLOGY (HCC): Primary | ICD-10-CM

## 2021-08-31 PROBLEM — I50.20 HFREF (HEART FAILURE WITH REDUCED EJECTION FRACTION) (HCC): Status: RESOLVED | Noted: 2020-10-11 | Resolved: 2021-08-31

## 2021-08-31 PROBLEM — L02.519 CELLULITIS AND ABSCESS OF HAND: Status: RESOLVED | Noted: 2020-10-11 | Resolved: 2021-08-31

## 2021-08-31 PROBLEM — R53.1 RIGHT SIDED WEAKNESS: Status: ACTIVE | Noted: 2021-08-31

## 2021-08-31 PROBLEM — L03.119 CELLULITIS AND ABSCESS OF HAND: Status: RESOLVED | Noted: 2020-10-11 | Resolved: 2021-08-31

## 2021-08-31 LAB
ALBUMIN SERPL-MCNC: 3.4 G/DL (ref 3.5–5)
ALBUMIN/GLOB SERPL: 1 {RATIO} (ref 1.1–2.2)
ALP SERPL-CCNC: 114 U/L (ref 45–117)
ALT SERPL-CCNC: 21 U/L (ref 12–78)
ANION GAP SERPL CALC-SCNC: 3 MMOL/L (ref 5–15)
AST SERPL-CCNC: 10 U/L (ref 15–37)
BASOPHILS # BLD: 0.1 K/UL (ref 0–0.1)
BASOPHILS NFR BLD: 1 % (ref 0–1)
BILIRUB SERPL-MCNC: 0.4 MG/DL (ref 0.2–1)
BUN SERPL-MCNC: 26 MG/DL (ref 6–20)
BUN/CREAT SERPL: 27 (ref 12–20)
CALCIUM SERPL-MCNC: 8.5 MG/DL (ref 8.5–10.1)
CHLORIDE SERPL-SCNC: 109 MMOL/L (ref 97–108)
CHOLEST SERPL-MCNC: 158 MG/DL
CO2 SERPL-SCNC: 30 MMOL/L (ref 21–32)
COMMENT, HOLDF: NORMAL
CREAT SERPL-MCNC: 0.97 MG/DL (ref 0.7–1.3)
DIFFERENTIAL METHOD BLD: NORMAL
EOSINOPHIL # BLD: 0.2 K/UL (ref 0–0.4)
EOSINOPHIL NFR BLD: 2 % (ref 0–7)
ERYTHROCYTE [DISTWIDTH] IN BLOOD BY AUTOMATED COUNT: 13.1 % (ref 11.5–14.5)
EST. AVERAGE GLUCOSE BLD GHB EST-MCNC: 203 MG/DL
GLOBULIN SER CALC-MCNC: 3.3 G/DL (ref 2–4)
GLUCOSE BLD STRIP.AUTO-MCNC: 112 MG/DL (ref 65–117)
GLUCOSE BLD STRIP.AUTO-MCNC: 127 MG/DL (ref 65–117)
GLUCOSE SERPL-MCNC: 162 MG/DL (ref 65–100)
HBA1C MFR BLD: 8.7 % (ref 4–5.6)
HCT VFR BLD AUTO: 42.8 % (ref 36.6–50.3)
HDLC SERPL-MCNC: 35 MG/DL
HDLC SERPL: 4.5 {RATIO} (ref 0–5)
HGB BLD-MCNC: 14.5 G/DL (ref 12.1–17)
IMM GRANULOCYTES # BLD AUTO: 0 K/UL (ref 0–0.04)
IMM GRANULOCYTES NFR BLD AUTO: 0 % (ref 0–0.5)
INR PPP: 1.1 (ref 0.9–1.1)
LDLC SERPL CALC-MCNC: 105.6 MG/DL (ref 0–100)
LYMPHOCYTES # BLD: 1.5 K/UL (ref 0.8–3.5)
LYMPHOCYTES NFR BLD: 18 % (ref 12–49)
MCH RBC QN AUTO: 30.1 PG (ref 26–34)
MCHC RBC AUTO-ENTMCNC: 33.9 G/DL (ref 30–36.5)
MCV RBC AUTO: 89 FL (ref 80–99)
MONOCYTES # BLD: 0.5 K/UL (ref 0–1)
MONOCYTES NFR BLD: 6 % (ref 5–13)
NEUTS SEG # BLD: 6.2 K/UL (ref 1.8–8)
NEUTS SEG NFR BLD: 73 % (ref 32–75)
NRBC # BLD: 0 K/UL (ref 0–0.01)
NRBC BLD-RTO: 0 PER 100 WBC
PLATELET # BLD AUTO: 219 K/UL (ref 150–400)
PMV BLD AUTO: 10.4 FL (ref 8.9–12.9)
POTASSIUM SERPL-SCNC: 4.3 MMOL/L (ref 3.5–5.1)
PROT SERPL-MCNC: 6.7 G/DL (ref 6.4–8.2)
PROTHROMBIN TIME: 11.8 SEC (ref 9–11.1)
RBC # BLD AUTO: 4.81 M/UL (ref 4.1–5.7)
SAMPLES BEING HELD,HOLD: NORMAL
SERVICE CMNT-IMP: ABNORMAL
SERVICE CMNT-IMP: NORMAL
SODIUM SERPL-SCNC: 142 MMOL/L (ref 136–145)
TRIGL SERPL-MCNC: 87 MG/DL (ref ?–150)
TSH SERPL DL<=0.05 MIU/L-ACNC: 1.56 UIU/ML (ref 0.36–3.74)
VLDLC SERPL CALC-MCNC: 17.4 MG/DL
WBC # BLD AUTO: 8.4 K/UL (ref 4.1–11.1)

## 2021-08-31 PROCEDURE — 0042T CT PERF W CBF: CPT

## 2021-08-31 PROCEDURE — 70498 CT ANGIOGRAPHY NECK: CPT

## 2021-08-31 PROCEDURE — 74011636637 HC RX REV CODE- 636/637: Performed by: INTERNAL MEDICINE

## 2021-08-31 PROCEDURE — 82962 GLUCOSE BLOOD TEST: CPT

## 2021-08-31 PROCEDURE — 85025 COMPLETE CBC W/AUTO DIFF WBC: CPT

## 2021-08-31 PROCEDURE — 74011250636 HC RX REV CODE- 250/636: Performed by: INTERNAL MEDICINE

## 2021-08-31 PROCEDURE — 99285 EMERGENCY DEPT VISIT HI MDM: CPT

## 2021-08-31 PROCEDURE — 74011250637 HC RX REV CODE- 250/637: Performed by: INTERNAL MEDICINE

## 2021-08-31 PROCEDURE — 85610 PROTHROMBIN TIME: CPT

## 2021-08-31 PROCEDURE — 74011250637 HC RX REV CODE- 250/637: Performed by: EMERGENCY MEDICINE

## 2021-08-31 PROCEDURE — 84443 ASSAY THYROID STIM HORMONE: CPT

## 2021-08-31 PROCEDURE — 74011000636 HC RX REV CODE- 636: Performed by: RADIOLOGY

## 2021-08-31 PROCEDURE — 80053 COMPREHEN METABOLIC PANEL: CPT

## 2021-08-31 PROCEDURE — 80061 LIPID PANEL: CPT

## 2021-08-31 PROCEDURE — 83036 HEMOGLOBIN GLYCOSYLATED A1C: CPT

## 2021-08-31 PROCEDURE — 36415 COLL VENOUS BLD VENIPUNCTURE: CPT

## 2021-08-31 PROCEDURE — 70450 CT HEAD/BRAIN W/O DYE: CPT

## 2021-08-31 PROCEDURE — 96372 THER/PROPH/DIAG INJ SC/IM: CPT

## 2021-08-31 PROCEDURE — 99218 HC RM OBSERVATION: CPT

## 2021-08-31 RX ORDER — ROPINIROLE 1 MG/1
1 TABLET, FILM COATED ORAL
Status: DISCONTINUED | OUTPATIENT
Start: 2021-08-31 | End: 2021-09-02 | Stop reason: HOSPADM

## 2021-08-31 RX ORDER — DEXTROSE 50 % IN WATER (D50W) INTRAVENOUS SYRINGE
25-50 AS NEEDED
Status: DISCONTINUED | OUTPATIENT
Start: 2021-08-31 | End: 2021-09-02 | Stop reason: HOSPADM

## 2021-08-31 RX ORDER — ROSUVASTATIN CALCIUM 10 MG/1
10 TABLET, COATED ORAL
Status: DISCONTINUED | OUTPATIENT
Start: 2021-08-31 | End: 2021-09-01

## 2021-08-31 RX ORDER — ONDANSETRON 4 MG/1
4 TABLET, ORALLY DISINTEGRATING ORAL
Status: DISCONTINUED | OUTPATIENT
Start: 2021-08-31 | End: 2021-09-02 | Stop reason: HOSPADM

## 2021-08-31 RX ORDER — SODIUM CHLORIDE 0.9 % (FLUSH) 0.9 %
5-40 SYRINGE (ML) INJECTION EVERY 8 HOURS
Status: DISCONTINUED | OUTPATIENT
Start: 2021-08-31 | End: 2021-09-02 | Stop reason: HOSPADM

## 2021-08-31 RX ORDER — SODIUM CHLORIDE 0.9 % (FLUSH) 0.9 %
5-40 SYRINGE (ML) INJECTION AS NEEDED
Status: DISCONTINUED | OUTPATIENT
Start: 2021-08-31 | End: 2021-09-02 | Stop reason: HOSPADM

## 2021-08-31 RX ORDER — DULOXETIN HYDROCHLORIDE 30 MG/1
120 CAPSULE, DELAYED RELEASE ORAL
Status: DISCONTINUED | OUTPATIENT
Start: 2021-08-31 | End: 2021-09-02 | Stop reason: HOSPADM

## 2021-08-31 RX ORDER — POLYETHYLENE GLYCOL 3350 17 G/17G
17 POWDER, FOR SOLUTION ORAL DAILY PRN
Status: DISCONTINUED | OUTPATIENT
Start: 2021-08-31 | End: 2021-09-02 | Stop reason: HOSPADM

## 2021-08-31 RX ORDER — ACETAMINOPHEN 650 MG/1
650 SUPPOSITORY RECTAL
Status: DISCONTINUED | OUTPATIENT
Start: 2021-08-31 | End: 2021-09-02 | Stop reason: HOSPADM

## 2021-08-31 RX ORDER — GUAIFENESIN 100 MG/5ML
325 LIQUID (ML) ORAL
Status: COMPLETED | OUTPATIENT
Start: 2021-08-31 | End: 2021-08-31

## 2021-08-31 RX ORDER — ONDANSETRON 2 MG/ML
4 INJECTION INTRAMUSCULAR; INTRAVENOUS
Status: DISCONTINUED | OUTPATIENT
Start: 2021-08-31 | End: 2021-09-02 | Stop reason: HOSPADM

## 2021-08-31 RX ORDER — CARVEDILOL 6.25 MG/1
6.25 TABLET ORAL 2 TIMES DAILY
Status: DISCONTINUED | OUTPATIENT
Start: 2021-08-31 | End: 2021-09-02 | Stop reason: HOSPADM

## 2021-08-31 RX ORDER — MAGNESIUM SULFATE 100 %
4 CRYSTALS MISCELLANEOUS AS NEEDED
Status: DISCONTINUED | OUTPATIENT
Start: 2021-08-31 | End: 2021-09-02 | Stop reason: HOSPADM

## 2021-08-31 RX ORDER — INSULIN LISPRO 100 [IU]/ML
INJECTION, SOLUTION INTRAVENOUS; SUBCUTANEOUS
Status: DISCONTINUED | OUTPATIENT
Start: 2021-08-31 | End: 2021-09-02 | Stop reason: HOSPADM

## 2021-08-31 RX ORDER — ENOXAPARIN SODIUM 100 MG/ML
40 INJECTION SUBCUTANEOUS DAILY
Status: DISCONTINUED | OUTPATIENT
Start: 2021-08-31 | End: 2021-08-31

## 2021-08-31 RX ORDER — ACETAMINOPHEN 325 MG/1
650 TABLET ORAL
Status: DISCONTINUED | OUTPATIENT
Start: 2021-08-31 | End: 2021-09-02 | Stop reason: HOSPADM

## 2021-08-31 RX ORDER — INSULIN GLARGINE 100 [IU]/ML
50 INJECTION, SOLUTION SUBCUTANEOUS
Status: DISCONTINUED | OUTPATIENT
Start: 2021-08-31 | End: 2021-09-02 | Stop reason: HOSPADM

## 2021-08-31 RX ORDER — ENOXAPARIN SODIUM 100 MG/ML
40 INJECTION SUBCUTANEOUS EVERY 24 HOURS
Status: DISCONTINUED | OUTPATIENT
Start: 2021-08-31 | End: 2021-09-02 | Stop reason: HOSPADM

## 2021-08-31 RX ADMIN — IOPAMIDOL 140 ML: 755 INJECTION, SOLUTION INTRAVENOUS at 09:47

## 2021-08-31 RX ADMIN — Medication 10 ML: at 13:22

## 2021-08-31 RX ADMIN — INSULIN GLARGINE 50 UNITS: 100 INJECTION, SOLUTION SUBCUTANEOUS at 22:19

## 2021-08-31 RX ADMIN — Medication 10 ML: at 22:05

## 2021-08-31 RX ADMIN — ENOXAPARIN SODIUM 40 MG: 40 INJECTION SUBCUTANEOUS at 13:21

## 2021-08-31 RX ADMIN — ASPIRIN 324 MG: 81 TABLET, CHEWABLE ORAL at 11:17

## 2021-08-31 RX ADMIN — DULOXETINE HYDROCHLORIDE 120 MG: 30 CAPSULE, DELAYED RELEASE ORAL at 22:05

## 2021-08-31 RX ADMIN — ROPINIROLE HYDROCHLORIDE 1 MG: 1 TABLET, FILM COATED ORAL at 22:19

## 2021-08-31 RX ADMIN — ROSUVASTATIN CALCIUM 10 MG: 10 TABLET, COATED ORAL at 22:05

## 2021-08-31 RX ADMIN — CARVEDILOL 6.25 MG: 12.5 TABLET, FILM COATED ORAL at 18:25

## 2021-08-31 NOTE — H&P
[facility]    Hospitalist Admission Note      NAME:  Adelita Jackson   :   1955   MRN:  [id number]     PCP:  Reyna Cordova MD     Date/Time of service:  2021 11:03 AM          Subjective:     CHIEF COMPLAINT: right side weakness     HISTORY OF PRESENT ILLNESS:     Mr. Russell Stone is a 73 y.o.  male who presented to the Emergency Department complaining of righ side weakness. Hx of the same, with R thenar wasting, related to cerrvical issues. Today his R hand weakness was more general, and worse, and a trace of general weakness in leg. ER called code stroke. CT and CTA are read as normal by radiology. MRI ordered, but kim has ICD. Neurology consult. Prior ECHO with mild dCHF and no PFO. Past Medical History:   Diagnosis Date    Anxiety and depression     Carbuncle and furuncle of neck     CHF (congestive heart failure), NYHA class I, chronic, diastolic (HCC)     [id number] vaccine series completed     [facility]    Hypertension     ICD (implantable cardioverter-defibrillator) in place     Infection of skin due to methicillin resistant Staphylococcus aureus (MRSA) 2015    Restless leg syndrome     Type 2 diabetes mellitus ([address]     Unspecified sleep apnea     lost weight        Past Surgical History:   Procedure Laterality Date    HX CYST INCISION AND DRAINAGE Right 10/13/2020    Hand-     HX CYST REMOVAL  2015    Boils off of back of neck- with infection    HX FREE SKIN GRAFT  2015    HX ORTHOPAEDIC      bone spur right foot.     HX PACEMAKER      AICD    HX UROLOGICAL      hydrocele repair left testicle    HX WISDOM TEETH EXTRACTION         Social History     Tobacco Use    Smoking status: Light Tobacco Smoker     Packs/day: 0.25     Years: 20.00     Pack years: 5.00    Smokeless tobacco: Never Used    Tobacco comment: cigars   Substance Use Topics    Alcohol use: Not Currently     Alcohol/week: 3.3 standard drinks     Types: 4 Cans of beer per week No family history on file. Family hx cannot be fully assessed, since the patient cannot provide information    Allergies   Allergen Reactions    Penicillins Hives        Prior to Admission medications    Medication Sig Start Date End Date Taking? Authorizing Provider   senna-docusate (PERICOLACE) 8.6-50 mg per tablet Take 1 Tablet by mouth two (2) times a day. 6/9/21   Adamaris Monique, NP   cholecalciferol, vitamin D3, (VITAMIN D3 PO) Take 10,000 Units by mouth nightly. Provider, Historical   LOSARTAN PO Take  by mouth nightly. Pt does not recall dose    Provider, Historical   semaglutide (Ozempic) 1 mg/dose (2 mg/1.5 mL) sub-q pen 1 mg by SubCUTAneous route every seven (7) days. SUNDAY  Patient not taking: Reported on 6/8/2021    Provider, Historical   DULoxetine (Cymbalta) 60 mg capsule Take 120 mg by mouth nightly. Provider, Historical   insulin degludec Gerianne Clay FlexTouch U-100) 100 unit/mL (3 mL) inpn 50 Units by SubCUTAneous route every evening. Provider, Historical   empagliflozin (Jardiance) 25 mg tablet Take 25 mg by mouth nightly. Provider, Historical   carvediloL (Coreg) 6.25 mg tablet Take 6.25 mg by mouth two (2) times a day. Provider, Historical   bumetanide (BUMEX) 1 mg tablet Take 1 mg by mouth nightly. Provider, Historical   rosuvastatin (CRESTOR) 10 mg tablet Take 10 mg by mouth nightly. Provider, Historical   rOPINIRole (Requip) 1 mg tablet Take 1 mg by mouth nightly.     Provider, Historical       Review of Systems:  (bold if positive, if negative)    Gen:  Eyes:  ENT:  CVS:  Pulm:  GI:  :  MS:  weaknessSkin:  Psych:  Endo:  Hem:  Renal:  Neuro:  weakness      Objective:      VITALS:    Vital signs reviewed; most recent are:    Visit Vitals  BP (!) 140/83 (BP 1 Location: Left upper arm, BP Patient Position: At rest)   Pulse 81   Temp 97.8 °F (36.6 °C)   Resp 18   Ht 6' (1.829 m)   Wt 105.6 kg (232 lb 12.9 oz)   SpO2 99%   BMI 31.57 kg/m²     SpO2 Readings from Last 6 Encounters:   08/31/21 99%   06/09/21 97%   05/26/21 100%   04/06/21 97%   10/16/20 95%   07/14/14 93%        No intake or output data in the 24 hours ending 08/31/21 1210     Exam:     Physical Exam:    Gen:  Obese, in no acute distress  HEENT:  Pink conjunctivae, PERRL, hearing intact to voice, moist mucous membranes  Neck:  Supple, without masses, thyroid non-tender  Resp:  No accessory muscle use, clear breath sounds without wheezes rales or rhonchi  Card:  No murmurs, normal S1, S2 without thrills, bruits or peripheral edema  Abd:  Soft, non-tender, non-distended, normoactive bowel sounds are present, no mass  Lymph:  No cervical or inguinal adenopathy  Musc:  No cyanosis or clubbing  Skin:  No rashes or ulcers, skin turgor is good  Neuro:  Cranial nerves are grossly intact, right side weakness, follows commands appropriately  Psych:  Good insight, oriented to person, place and time, alert     Labs:    Recent Labs     08/31/21  0925   WBC 8.4   HGB 14.5   HCT 42.8        Recent Labs     08/31/21  0925      K 4.3   *   CO2 30   *   BUN 26*   CREA 0.97   CA 8.5   ALB 3.4*   TBILI 0.4   ALT 21     Lab Results   Component Value Date/Time    Glucose (POC) 202 (H) 06/09/2021 11:45 AM    Glucose (POC) 134 (H) 06/09/2021 05:50 AM     No results for input(s): PH, PCO2, PO2, HCO3, FIO2 in the last 72 hours. Recent Labs     08/31/21  0925   INR 1.1     All Micro Results     None          I have reviewed previous records       Assessment and Plan:      Right sided weakness - Admit to OBS on tele. TIA workup. Neuro consult and neurochecks. MRI brain ordered, stat consult to cardiology to define ICD as compatable. Prior ECHO heart without PFO. Check Lipids, TSH and A1c. Start ASA and lovenox. Fall precautions and PT/OT eval.    Cervical stenosis of spinal canal - POA and may be another reason for the R side weakness. MRI as above. Cervical MRI.   Consult orthopedics Dr Luanne Mack    CHF (congestive heart failure), NYHA class I, chronic, diastolic / ICD (implantable cardioverter-defibrillator) in place / HTN (hypertension) - POA, appears stable. Cardiology consulted to define ICD. Resume coreg and rosuvastatin, but hold bumex today. Type II diabetes mellitus - Diabetic diet and counseling. SSI per protocol. Continue home long acting insulin, bu hold PO meds. Check A1c. Obese / Sleep apnea - advise kam loss and CEASAR evaluation. CKD (chronic kidney disease) stage 3, GFR 30-59 ml/min - Monitor in AM    Restless leg syndrome - Requip    Anxiety and depression - Continue duloxetine     Telemetry reviewed:   normal sinus rhythm    Risk of deterioration: high      Total time spent with patient: 48 Minutes I personally reviewed chart, notes, data and current medications in the medical record. I have personally examined and treated the patient at bedside during this period.                  Care Plan discussed with: Patient, Family, Nursing Staff and >50% of time spent in counseling and coordination of care    Discussed:  Care Plan       ___________________________________________________    Attending Physician: Elizabeth Rosales MD

## 2021-08-31 NOTE — ROUTINE PROCESS
Pt has defibrillator. Faxed cardiology form to Dr. Renay Iraheta (pt's cardiologist). Waiting for form to be faxed back to [facility] department. Once form comes back, we will get the [employer] rep in to set pts device and we can proceed with the mri at that time.

## 2021-08-31 NOTE — ED PROVIDER NOTES
78-year-old male with history of anxiety and depression, congestive heart failure, hypertension, type 2 diabetes, and cervical spondylosis, status post C-spine surgery with Dr. Bob Vital in June is here with right arm and leg weakness that started around 3 AM today, last known normal was 10 PM.  No headache or vision change. No slurred speech or facial asymmetry. He had weakness and atrophy of the right hand from his cervical spine issues, but he had difficulty using his fingers to punch in numbers on his phone today. Past Medical History:   Diagnosis Date    Anxiety and depression     Carbuncle and furuncle of neck 2015    CHF (congestive heart failure) ([facility] [address]     COVID-19 vaccine series completed     [facility]    Hypertension     ICD (implantable cardioverter-defibrillator) in place     Infection of skin due to methicillin resistant Staphylococcus aureus (MRSA) 2015    Restless leg syndrome     Streptococcal bacteremia     elbow    Type 2 diabetes mellitus ([facility] [address] [facility]     Unspecified sleep apnea     lost weight       Past Surgical History:   Procedure Laterality Date    HX CYST INCISION AND DRAINAGE Right 10/13/2020    Hand-     HX CYST REMOVAL  2015    Boils off of back of neck- with infection    HX FREE SKIN GRAFT  2015    HX ORTHOPAEDIC      bone spur right foot.  HX PACEMAKER      AICD    HX UROLOGICAL      hydrocele repair left testicle    HX WISDOM TEETH EXTRACTION           No family history on file.     Social History     Socioeconomic History    Marital status:      Spouse name: Not on file    Number of children: Not on file    Years of education: Not on file    Highest education level: Not on file   Occupational History    Not on file   Tobacco Use    Smoking status: Light Tobacco Smoker     Packs/day: 0.25     Years: 20.00     Pack years: 5.00    Smokeless tobacco: Never Used    Tobacco comment: cigars   Substance and Sexual Activity    Alcohol use: Not Currently Alcohol/week: 3.3 standard drinks     Types: 4 Cans of beer per week    Drug use: No    Sexual activity: Not on file   Other Topics Concern    Not on file   Social History Narrative    Not on file     Social Determinants of Health     Financial Resource Strain:     Difficulty of Paying Living Expenses:    Food Insecurity:     Worried About Running Out of Food in the Last Year:     [address] in the Last Year:    Transportation Needs:     Lack of Transportation (Medical):  Lack of Transportation (Non-Medical):    Physical Activity:     Days of Exercise per Week:     Minutes of Exercise per Session:    Stress:     Feeling of Stress :    Social Connections:     Frequency of Communication with Friends and Family:     Frequency of Social Gatherings with Friends and Family:     Attends [employer] Services:     Active Member of Clubs or Organizations:     Attends Club or Organization Meetings:     Marital Status:    Intimate Partner Violence:     Fear of Current or Ex-Partner:     Emotionally Abused:     Physically Abused:     Sexually Abused: ALLERGIES: Penicillins    Review of Systems   Constitutional: Negative for fever. HENT: Negative for trouble swallowing. Eyes: Negative for visual disturbance. Respiratory: Negative for cough. Cardiovascular: Negative for chest pain. Gastrointestinal: Negative for abdominal pain. Genitourinary: Negative for difficulty urinating. Musculoskeletal: Positive for gait problem. Skin: Negative for rash. Neurological: Negative for headaches. Hematological: Does not bruise/bleed easily. Psychiatric/Behavioral: Negative for sleep disturbance. Vitals:    08/31/21 0843   BP: (!) 140/83   Pulse: 81   Resp: 18   Temp: 97.8 °F (36.6 °C)   SpO2: 99%   Weight: 105.6 kg (232 lb 12.9 oz)   Height: 6' (1.829 m)            Physical Exam  Constitutional:       Appearance: Normal appearance. HENT:      Head: Normocephalic.       Nose: Nose normal.      Mouth/Throat:      Mouth: Mucous membranes are moist.   Eyes:      Extraocular Movements: Extraocular movements intact. Conjunctiva/sclera: Conjunctivae normal.   Cardiovascular:      Rate and Rhythm: Normal rate. Pulmonary:      Effort: Pulmonary effort is normal. No respiratory distress. Abdominal:      General: Abdomen is flat. Musculoskeletal:         General: Normal range of motion. Skin:     Findings: No rash. Neurological:      Mental Status: He is alert and oriented to person, place, and time. Comments: Atrophy of the right hand with flexion of the fingers  Mild ataxia of the right upper extremity   Psychiatric:         Behavior: Behavior normal.          MDM  Number of Diagnoses or Management Options  Hemiparesis of right dominant side, unspecified hemiparesis etiology ([address]  Diagnosis management comments: Perfect Serve Consult for Admission  10:40 AM    ED Room Number: ER10/10  Patient Name and age:  Beena Valle 73 y.o.  male  Working Diagnosis: Hemiparesis of right dominant side, unspecified hemiparesis etiology ([address]  (primary encounter diagnosis)    COVID-19 Suspicion:  no  Sepsis present:  no  Reassessment needed: no  Code Status:  Full Code  Readmission: no  Isolation Requirements:  no  Recommended Level of Care:  telemetry  Department:[facility] ED - [phone]  Other: Patient was a stroke alert with last known well of 2200 yesterday. Right-sided deficits with history of right upper extremity weakness and muscle wasting from cervical spine issues. Dr. Vivien Zamora has performed spine surgery in June. Teleneurologist thinks this is more than likely a stroke and recommended MRI and work-up. 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 I have given him some aspirin and IV fluids.            Critical Care  Performed by: Vic Ahuja DO  Authorized by: Vic Ahuja DO     Critical care provider statement:     Critical care time (minutes):  38 Critical care time was exclusive of:  Separately billable procedures and treating other patients and teaching time    Critical care was necessary to treat or prevent imminent or life-threatening deterioration of the following conditions:  CNS failure or compromise    Critical care was time spent personally by me on the following activities:  Development of treatment plan with patient or surrogate, discussions with consultants, evaluation of patient's response to treatment, examination of patient, interpretation of cardiac output measurements, obtaining history from patient or surrogate, ordering and performing treatments and interventions, ordering and review of laboratory studies, ordering and review of radiographic studies, pulse oximetry and re-evaluation of patient's condition    I assumed direction of critical care for this patient from another provider in my specialty: no

## 2021-08-31 NOTE — PROGRESS NOTES
8/31/2021  6:13 PM  Case management note    Reason for Admission:  Rt side weakness    Patient came to ED for right sided weakness. Patient is , independent with ADL's and drives. Patient has history of chf, HTN ICD and DM. Patient had neck surgery about 3 months her. Hemalatha @ [email or web address] educated, letter signed and placed in chart. RUR Score:         low            Plan for utilizing home health:      Pt/ot to eval    PCP: First and Last name:  Mj Shane MD     Name of Practice:    Are you a current patient: Yes/No: yes   Approximate date of last visit: May 2021   Can you participate in a virtual visit with your PCP:                     Current Advanced Directive/Advance Care Plan: Full Code NO AD      Healthcare Decision Maker:   wife Beau Moore wife                              Transition of Care Plan:           1. Home with family assistance  2. PCP follow up  3. Neuro follow up  4. AD planning  5. CM to follow for discharge needs    Care Management Interventions  PCP Verified by CM:  Yes Monica Rivera MD)  Mode of Transport at Discharge: Self  Current Support Network: Lives with Spouse  Discharge Location  Discharge Placement: Home with family assistance  Donald Hall

## 2021-08-31 NOTE — ED TRIAGE NOTES
Patient arrives with complaint of right hand weakness, and right foot weakness since 0330 this morning. States right hand was \"naturally curling\" and had difficulty extending and straightening hand. States \"I feel like I had slight weakness in right foot. \"    Patient states had cervical spine surgery on June 8th and right hand surgery last year. States 5th digit of right hand has limited movement since hand surgery. Denies change in vision change, headache/dizziness, sensation change, speech change, facial droop. Hx of diabetes with neuropathy in lower legs. Denies chest pain, SOB, cough, fever. Dr. Momo Encinas notified about patient.

## 2021-08-31 NOTE — VIRTUAL HEALTH
Dr. Radha Cha last office note:    Juan Chaudhry 1955   Office/Outpatient Visit  Visit Date: Fri, Apr 2, 2021 9:30 am  Provider: Sudhir Anne MD (Assistant: Ronni Cool )  Location: [facility] AT [facility] ([facility])- [address] [place] [address] Boris Peralta. [phone]    Electronically signed by Darrian Leonard MD on  04/02/2021 09:58:49 AM                           Subjective:    CC: Mr. Vic Villa is a 60year old White male. His primary care physician is Janet Long MD.  He presents for cardiac evaluation for pre-operative clearance. Mr. Vic Villa is planning to have Neck surgery with Dr. Claude Soto 04/13/2021 ([facility]). He is here today following a transition of care from his specialist provider. Patient verbalized medications unchanged since last office visit. He has a history of cardiomyopathy ( unspecified ), coronary artery disease of the native coronary artery,  essential hypertension, and ICD in situ. HPI:           Regarding cardiomyopathy:  MD Notes: ef normal range, consider restart entresto post op, acceptable cardiac risk for surgery   He is here today pre-op. The course of the disease has been stable. Currently, his treatment regimen consists of Coreg and a diuretic ( bumetanide ). A transthoracic ECHO has been done ( performed 10/19/2015 ). hx of passing out while doing PT and had hand in whirlpool - it was hot and got overheated. Consuella Phillip was then stopped and Coreg was reduced and doing fine since then. States he feels fine and has for some time. Coronary Artery Disease:   He is here today pre-op clearance. The course of the disease has been stable. Currently, his treatment regimen consists of daily 81 mg aspirin,  Coreg, and Crestor. Current level of activity includes ability to walk. Mr. Vic Villa is compliant with tobacco avoidance and taking medications as recommended and prescribed. A cardiac cath has been done ( performed 07/2015 ).             Regarding hypertension:  Type Primary Hypertension Current nonpharmacologic treatment includes smoking cessation. Currently, his treatment regimen consists of Coreg and a diuretic ( bumetanide ). Presence of automatic (implantable) cardiac defibrillator noted. Encounter for preprocedural cardiovascular examination noted. neck surgery. On ASA. ROS:   Discussed relevant lab and imaging studies along with the plan of treatment with the nurse. EYES:  Negative for blurred vision and eye pain. E/N/T:  Negative for epistaxis and hoarseness. CARDIOVASCULAR:  Please see HPI. RESPIRATORY:  Negative for cough and hemoptysis. GASTROINTESTINAL:  Negative for abdominal pain and dysphagia. MUSCULOSKELETAL:  Negative for arthralgias and back pain. NEUROLOGICAL:  Negative for headaches, paresthesias, and weakness. HEMATOLOGIC/LYMPHATIC:  Negative for easy bruising, bleeding, and lymphadenopathy. PSYCHIATRIC:  No symptoms reported. Past Medical History / Family History / Social History:     Last Reviewed on 2021 09:45 AM by Tita Beasley  Past Medical History:     Hypertension  Cardiomyopathy severe   Pneumonia: dx'd in ; hospitalized;   Sleep Apnea: (+) sleep study; Type 2 Diabetes   Mediastinal lymphadenopathy   MRSA   INFLUENZA VACCINE: was last done    COVID-19 VACCINE: was last done 2021 The next one is due  2021 [address]: was last done 2017     Past Cardiac Procedures/Tests:  Echocardiogram on 10/19/15 - Severely decreased LV systolic function. with an estimated ejection fraction of 15-20%. .      Surgical History:   Surgical/Procedural History:     Cardiac Surgery/Procedures:  Cardiac Catheterization:  07/17/15 - insignificant CAD  ICD     Family History:   Father:   ; Positive for COPD;   Mother:   ; Positive for Coronary Artery Disease;   ; Positive for Cancer (unspecified type);      Social History:   Social History: Occupation: a    Marital Status:    Children: None     Tobacco/Alcohol/Supplements:   Last Reviewed on 4/02/2021 09:45 AM by Galen PEREZ  TOBACCO/ALCOHOL/SUPPLEMENTS   Tobacco: Current Smoker: He currently smokes every day, Patient smokes cigars: 1 cigar a day. Smoking cessation intervention counseling was conducted. Alcohol: Drinks alcohol very infrequently. Caffeine:  He admits to consuming caffeine via tea ( occasionally ) and soda ( 10 servings per day ). Substance Abuse History:   Last Reviewed on 4/02/2021 09:45 AM by Galen PEREZ  Substance Use/Abuse: Unremarkable     Mental Health History:   Last Reviewed on 4/02/2021 09:45 AM by Carmel Escobedo (eg STDs): Last Reviewed on 4/02/2021 09:45 AM by Galen PEREZ    Allergies:   Last Reviewed on 4/02/2021 09:45 AM by Galen PEREZ  Penicillins: hives     Current Medications:   Last Reviewed on 4/02/2021 09:45 AM by Galen PEREZ  Jardiance   Ozempic   rosuvastatin  [unsure of dosage]  Coreg 6.25 mg oral tablet [take 1 tablet (6.25 mg) by oral route 2 times per day with food]  Tresiba U-100 Insulin   Requip 1mg Tablet [1 tab po qd]  aspirin 81 mg oral tablet, delayed release (enteric coated) [1 po qod]  Bumex 1 mg oral tablet [1 po qd prn]  Cymbalta 60 mg oral capsule,delayed release (enteric coated) [2 po qd]    Objective:    Vitals:     Historical:   11/6/2020  BP:   130/80 mm Hg ( (left arm, , sitting, );) 11/6/2020  Wt:   228lbs ( (Estimated, );)   Current: 4/2/2021 9:44:32 AM  Ht:  6 ft, 0 in; Wt: 232 lbs;  BMI: 31. 5BP: 124/71 mm Hg (left arm, sitting);  P: 81 bpm (left arm (BP Cuff), sitting);  sCr: 1.06 mg/dL;  GFR: 77.71    Exams:   GENERAL:  Alert, oriented to person, place and time x3. EYES:  Normal lids without xanthelasma; conjunctiva unremarkable; no scleral icterus. HEENT:  Face symmetric, voice clear, extraocular muscles intact. NECK:  Supple.   No bruits, No JVD. LUNGS:  Clear to auscultation. No rales, no wheezing. CARDIAC:  Regular rate and rhythm. PMI not displaced. ABDOMEN:  Soft. Positive bowel sounds. Non-tender. MUSCULOSKELETAL:  Normal range of motion, strength and tone. EXTREMITIES:  No edema. Good muscle tone and strength. SKIN: No significant rashes or lesions; no suspicious moles. NEUROLOGICAL:  No focal deficits, cranial nerves II-XII are grossly intact. PSYCHIATRIC:  Appropriate affect and demeanor; normal speech pattern; grossly normal memory. Procedures:   Essential (primary) hypertension    ECG INTERPRETATION: See scanned EKG for results.         Assessment:     I42.5   Other restrictive cardiomyopathy     I25.10   Atherosclerotic heart disease of native coronary artery without angina pectoris     I10   Essential (primary) hypertension     Z95.810   Presence of automatic (implantable) cardiac defibrillator     Z01.810   Encounter for preprocedural cardiovascular examination       ORDERS:     Radiology/Test Orders:     3021F  LVEF <40% or documentation of moderately or severely depressed LVSF (CAD, HF)1  (Send-Out)          XSURG  Cleared for surgery  (In-House)          Procedures Ordered:     57945  Education and train for pt self-mgmt by qualified, nonphysician, ea 30 minutes; individual pt  (Send-Out)          72907  Electrocardiogram, routine with at least 12 leads; with interpretation and report  (In-House)          Other Orders:       ACE or ARB NOT Prescribed, reasons documented by clinician  (In-House)            LDL-C >= 100 mg/dL  (Send-Out)          0556F  Plan of care to achieve lipid control documented (CAD)  (In-House)          4013F  Statin therapy rxd/currently taken(CAD)  (In-House)          3120J  Aspirin or clopidogrel prescribed (CAD)  (Send-Out)          4010F  ACE inhibitor/ARB therapy rxd/currently taken  (In-House)            Beta-blocker therapy prescribed for pt with LVEF <40% or mod/severely depressed LVSF  (In-House)          4450F  Self-care education provided to patient (HF)  (Send-Out)          [id number]  Queried Patient for Tobacco Use  (Send-Out)              Plan:     Essential (primary) hypertension      Orders:     08197  Electrocardiogram, routine with at least 12 leads; with interpretation and report  (In-House)          Encounter for preprocedural cardiovascular examination  CAD: with ACE/ARB Rx with Diabetes, NOT Prescribed for documented reason Lipid Control LDL >= 100 mg/dL Plan of care to achieve lipid control documented Statin therapy prescribed or currently being taken with Antiplatelet Therapy Aspirin or Clopidogrel Prescribed CHF PQRS #198: LVEF Assessment Moderate-to-Severely Depressed LVSF (LVEF < 40%) # 5: ACE and/or ARB Prescribed for LVSD ACE/ARB Prescribed or NOT ACE/ARB NOT Prescribed d/t Medical Reason # 8 Beta-Blocker Therapy for LVSD Beta-Blocker Prescribed or NOT Beta-blocker therapy prescribed Daily aspirin     Advised the patient to follow a low sodium diet no more than 2000mg per day, exercise, and lifestyle modification. Advised the patient on the importance of medication compliance and daily weight monitoring. Medication list has been reviewed. Continue current medications. Stop the following medication(s): HOLD Aspirin 5 days prior to procedure. .      Smoking status: He smokes cigars. Smoking cessation discussed with patient. The counseling session lasted less than 10 minutes. Acceptable cardiac risk for surgery. Schedule a follow-up appointment in 6 months. call a week after surgery and update on BP and how feeling. Discussed with patient diet, exercise plan and lifestyle modifications. The above note was transcribed by Shannan Melton and authenticated by Dr. Barbie Mtz prior to sign off.

## 2021-08-31 NOTE — CONSULTS
ORTHO CONSULT NOTE    Date of Consultation:  August 31, 2021  Referring Physician:  Carla Davis  CC: right hand weakness    HPI:  Shantel Doss is a 73 y.o. male PMH ACDF 6/8/2021 Dr. Chery Comer, I&D right small finger septic tenosynovitis 10/16/2020 Dr. Reji Handley, chronic small finger contracture, and chronic right hand atrophy who c/o right hand weakness that he noted around 03:00 today. He has increased difficulty using his thumb, especially for texting. He had mild gait change affecting right foot he noticed mostly descending stairs this am.  His neck feels better than it had in years. He denies bowel/bladder changes, fever, and neck incision problems. Past Medical History:   Diagnosis Date    Anxiety and depression     Carbuncle and furuncle of neck 2015    CHF (congestive heart failure), NYHA class I, chronic, diastolic (HCC)     [id number] vaccine series completed     [facility]     ICD (implantable cardioverter-defibrillator) in place     Infection of skin due to methicillin resistant Staphylococcus aureus (MRSA) 2015    Restless leg syndrome     Type 2 diabetes mellitus ([address]     Unspecified sleep apnea     lost weight      Past Surgical History:   Procedure Laterality Date    HX CYST INCISION AND DRAINAGE Right 10/13/2020    Hand-     HX CYST REMOVAL  2015    Boils off of back of neck- with infection    HX FREE SKIN GRAFT  2015    HX ORTHOPAEDIC      bone spur right foot.  HX PACEMAKER      AICD    HX UROLOGICAL      hydrocele repair left testicle    HX WISDOM TEETH EXTRACTION        No family history on file.    Social History     Tobacco Use    Smoking status: Light Tobacco Smoker     Packs/day: 0.25     Years: 20.00     Pack years: 5.00    Smokeless tobacco: Never Used    Tobacco comment: cigars   Substance Use Topics    Alcohol use: Not Currently     Alcohol/week: 3.3 standard drinks     Types: 4 Cans of beer per week     Allergies   Allergen Reactions    Penicillins Hives Review of Systems:  Per HPI. Objective:     Patient Vitals for the past 8 hrs:   BP Temp Pulse Resp SpO2 Height Weight   21 0843 (!) 140/83 97.8 °F (36.6 °C) 81 18 99 % 6' (1.829 m) 105.6 kg (232 lb 12.9 oz)     Temp (24hrs), Av.8 °F (36.6 °C), Min:97.8 °F (36.6 °C), Max:97.8 °F (36.6 °C)      EXAM:   NAD. Answers questions appropriately. Cervical scar well healed with no open wound and no erythema. Moves neck spontaneously without pain. Posterior cervical spine NTTP. LUE some atrophy dorsal 1st webspace. Strength 5/5 resisted shrug, shoulder abduct, elbow flex/extend, wrist motion, and . Neg Hoffmans. SILT. Palp radial pulse. RUE obvious atrophy dorsal 1st webspace with thumb weakness. No open wound or erythema. Chronic contracture small finger. Strength 5/5 resisted shrug, shoulder abduct, elbow flex, and wrist motion. Elbow ext 4+/5. Weak  and intrinsics. Neg Hoffmans, decreased sens ulnar nerve distribution. BLE strength 5/5 resisted hip, knee, ankle, and toe motion. SILT. Neg clonus. Imaging Review:     Results from Hospital Encounter encounter on 21    CT PERF W CBF    Narrative  CLINICAL HISTORY: Extremity weakness    EXAMINATION:  CT ANGIOGRAPHY HEAD AND NECK, CT PERFUSION    COMPARISON: None    TECHNIQUE:  Following the uneventful administration of iodinated contrast  material, axial CT angiography of the head and neck was performed. Delayed axial  images through the head were also obtained. Coronal and sagittal reconstructions  were obtained. Manual postprocessing of images was performed. 3-D  Sagittal  maximal intensity projection images were obtained. 3-D Coronal maximal  intensity projections were obtained. CT brain perfusion was performed with  generation of hemodynamic maps of multiple parameters, including cerebral blood  flow, cerebral blood volume, mean transit time, and TMAX.  CT dose reduction was  achieved through use of a standardized protocol tailored for this examination  and automatic exposure control for dose modulation. This study was analyzed by  the [address] ai algorithm. FINDINGS:    Delayed contrast-enhanced head CT:    The ventricles are midline without hydrocephalus. There is no acute intra or  extra-axial hemorrhage. The basal cisterns are clear. The paranasal sinuses are  clear. CTA NECK:    Great vessels: Normal arch anatomy with the origins patent. Right subclavian artery: Patent    Left subclavian artery: Patent    Right common carotid artery: Patent    Left common carotid artery: Patent    Cervical right internal carotid artery: Patent with no significant stenosis by  NASCET criteria. Cervical left internal carotid artery: Patent with no significant stenosis by  NASCET criteria. Right vertebral artery: Patent    Left vertebral artery: Patent    The lung apices are clear. The thyroid is homogeneous. No cervical  lymphadenopathy. CTA HEAD:    Right cavernous internal carotid artery: Patent    Left cavernous internal carotid artery: Patent    Anterior cerebral arteries: Hypoplastic left A1 segment. Conventional right A1  segment and bilateral distal ELLIE segments. Anterior communicating artery: Patent    Right middle cerebral artery: Patent    Left middle cerebral artery: Patent    Posterior communicating arteries: Patent    Posterior cerebral arteries: Patent    Basilar artery: Patent    Distal vertebral arteries: Patent    No evidence for intracranial aneurysm or hemodynamically significant stenosis. CT Perfusion:  Normal CT perfusion. Impression  1. No evidence of large vessel occlusion or hemodynamically significant carotid  stenosis. 2.  No perfusion abnormality.       Labs:   Recent Results (from the past 24 hour(s))   SAMPLES BEING HELD    Collection Time: 08/31/21  9:25 AM   Result Value Ref Range    SAMPLES BEING HELD 1PST,1SST,1LAV,1RED,1BL     COMMENT        Add-on orders for these samples will be processed based on acceptable specimen integrity and analyte stability, which may vary by analyte. CBC WITH AUTOMATED DIFF    Collection Time: 08/31/21  9:25 AM   Result Value Ref Range    WBC 8.4 4.1 - 11.1 K/uL    RBC 4.81 4.10 - 5.70 M/uL    HGB 14.5 12.1 - 17.0 g/dL    HCT 42.8 36.6 - 50.3 %    MCV 89.0 80.0 - 99.0 FL    MCH 30.1 26.0 - 34.0 PG    MCHC 33.9 30.0 - 36.5 g/dL    RDW 13.1 11.5 - 14.5 %    PLATELET 958 604 - 934 K/uL    MPV 10.4 8.9 - 12.9 FL    NRBC 0.0 0  WBC    ABSOLUTE NRBC 0.00 0.00 - 0.01 K/uL    NEUTROPHILS 73 32 - 75 %    LYMPHOCYTES 18 12 - 49 %    MONOCYTES 6 5 - 13 %    EOSINOPHILS 2 0 - 7 %    BASOPHILS 1 0 - 1 %    IMMATURE GRANULOCYTES 0 0.0 - 0.5 %    ABS. NEUTROPHILS 6.2 1.8 - 8.0 K/UL    ABS. LYMPHOCYTES 1.5 0.8 - 3.5 K/UL    ABS. MONOCYTES 0.5 0.0 - 1.0 K/UL    ABS. EOSINOPHILS 0.2 0.0 - 0.4 K/UL    ABS. BASOPHILS 0.1 0.0 - 0.1 K/UL    ABS. IMM. GRANS. 0.0 0.00 - 0.04 K/UL    DF AUTOMATED     METABOLIC PANEL, COMPREHENSIVE    Collection Time: 08/31/21  9:25 AM   Result Value Ref Range    Sodium 142 136 - 145 mmol/L    Potassium 4.3 3.5 - 5.1 mmol/L    Chloride 109 (H) 97 - 108 mmol/L    CO2 30 21 - 32 mmol/L    Anion gap 3 (L) 5 - 15 mmol/L    Glucose 162 (H) 65 - 100 mg/dL    BUN 26 (H) 6 - 20 MG/DL    Creatinine 0.97 0.70 - 1.30 MG/DL    BUN/Creatinine ratio 27 (H) 12 - 20      GFR est AA >60 >60 ml/min/1.73m2    GFR est non-AA >60 >60 ml/min/1.73m2    Calcium 8.5 8.5 - 10.1 MG/DL    Bilirubin, total 0.4 0.2 - 1.0 MG/DL    ALT (SGPT) 21 12 - 78 U/L    AST (SGOT) 10 (L) 15 - 37 U/L    Alk.  phosphatase 114 45 - 117 U/L    Protein, total 6.7 6.4 - 8.2 g/dL    Albumin 3.4 (L) 3.5 - 5.0 g/dL    Globulin 3.3 2.0 - 4.0 g/dL    A-G Ratio 1.0 (L) 1.1 - 2.2     PROTHROMBIN TIME + INR    Collection Time: 08/31/21  9:25 AM   Result Value Ref Range    INR 1.1 0.9 - 1.1      Prothrombin time 11.8 (H) 9.0 - 11.1 sec   TSH 3RD GENERATION    Collection Time: 08/31/21 12:54 PM   Result Value Ref Range    TSH 1.56 0.36 - 3.74 uIU/mL       Impression:     Patient Active Problem List    Diagnosis Date Noted    Right sided weakness 08/31/2021    Restless leg syndrome     ICD (implantable cardioverter-defibrillator) in place     Anxiety and depression     CHF (congestive heart failure), NYHA class I, chronic, diastolic (HCC)     Cervical stenosis of spinal canal 06/08/2021    Sleep apnea     CKD (chronic kidney disease) stage 3, GFR 30-59 ml/min (HCC)     Type II diabetes mellitus ([facility] 07/02/2014    HTN (hypertension) 07/02/2014     Principal Problem:    Right sided weakness (8/31/2021)    Active Problems:    Type II diabetes mellitus ([facility] (7/2/2014)      HTN (hypertension) (7/2/2014)      Sleep apnea ()      Overview: lost weight      CKD (chronic kidney disease) stage 3, GFR 30-59 ml/min (HCC) ()      Cervical stenosis of spinal canal (6/8/2021)      Restless leg syndrome ()      ICD (implantable cardioverter-defibrillator) in place ()      Anxiety and depression ()      CHF (congestive heart failure), NYHA class I, chronic, diastolic (HCC) ()      Acute on chronic right hand weakness, seems ulnar nerve distribution. Plan:   I spoke with Dr. Brian Campos to discuss history and exam.    Patient is getting MRI brain as TIA work-up so will add cervical spine MRI to be thorough. If above is negative, consider outpatient EMG and FU with Dr. Kelli Sevilla for eval ulnar nerve function. Dr. Mehreen Wallis agrees with above plan.       DUNG Hinds  Orthopedic Trauma Service  [facility]

## 2021-08-31 NOTE — PROGRESS NOTES
Review of chart reveals Mr. Patito Bhakta has a [employer] S-ICD model number A209 implanted 11/11/15. This is MRI compatible. As he does not appear to have any other pressing cardiac issues, let us know if we can be of further service.

## 2021-08-31 NOTE — DIABETES MGMT
This is a 52-year-old white male with a history of type 2 diabetes mellitus times many years, history of heart failure status post defibrillator placement, and cervical spondylosis who was admitted with new onset right-sided weakness. The patient has a significant history for cervical disease and recently underwent C-spine surgery in June of this year. He had acute onset of right upper extremity weakness and was admitted to the emergency room for evaluation and management. Admission laboratory data remarkable for glucose of 162, creatinine 0.97, bicarb 30, anion gap 3. Most recent A1c was in May and that was 7.2%. Regarding his diabetes, he tells me that in the past he was on a combination of Ozempic, Jardiance, [place]. In anticipation of his surgery he had a hemoglobin A1c which was 10.9%. He does tells me that he had not been taking the Ozempic or the [place] and was taking the Janora Saver intermittently. He was also eating a lot of cookies and candies and a lot of carb. This prompted a significant reevaluation of his diabetes and he was able to get his A1c down to 7.2 at the time of the surgery in June. I gather that he did restart the Janora Saver and [place] but he tells me today that he did not restart the Ozempic. He does tell me that the major adjustment that he made was to move everything to nighttime and he was able to remember his medications more effectively. In addition he reduced his carbohydrate intake to 60 g a day. Examination  Blood pressure 140/83  Pulse 81  Afebrile  99% on room air  HEENT unremarkable  Right upper extremity with significant contractures and marked thenar atrophy  Left upper extremity primarily primarily remarkable for a left 5th trigger finger  The remainder the examination is unremarkable      1. Type 2 diabetes mellitus with a recent A1c of 7.2 (May) admission glucose of 162 on Jardiance 25 mg daily and Tresiba 50 units daily.   2.  Acute left CVA with right upper extremity weakness    Recommendation  1. I would continue long-acting insulin. If the patient is to be admitted, I would transition to 50 units of Lantus daily. 2.  The Jardiance cannot be continued if he is to be admitted. 3.  I would anticipate that long-acting insulin plus correction insulin will be sufficient to manage his glucose    Total time 35 minutes    Please note that this dictation was completed with [employer]Parrot, the computer voice recognition software. Quite often unanticipated grammatical, syntax, homophones, and other interpretive errors are inadvertently transcribed by the computer software. Please disregard these errors. Please excuse any errors that have escaped final proofreading.

## 2021-08-31 NOTE — PROGRESS NOTES
Admission Medication Reconciliation:     Information obtained from:    Patient via interview in ER 10  Spouse called by patient from room. Spouse reviewed pill bottles and home and read doses to the patient. The patient obtains some medications from the [facility]. RxQuery data available¹:  YES    Comments/Recommendations:   Patient able to confirm name, , allergies, and preferred pharmacy  Updated PTA medication list  The patient has a prescription for Ozempic but he has not started it yet. ¹RxQuery pharmacy benefit data reflects medications filled and processed through the patient's insurance, however   this data does NOT capture whether the medication was picked up or is currently being taken by the patient. Prior to Admission Medications   Prescriptions Last Dose Informant Taking? DULoxetine (Cymbalta) 60 mg capsule 2021 Self Yes   Sig: Take 120 mg by mouth nightly. bumetanide (BUMEX) 1 mg tablet 2021 Self Yes   Sig: Take 1 mg by mouth every evening. carvediloL (Coreg) 6.25 mg tablet 2021 Self Yes   Sig: Take 6.25 mg by mouth two (2) times a day. empagliflozin (Jardiance) 25 mg tablet 2021 Self Yes   Sig: Take 25 mg by mouth nightly. insulin degludec [place] FlexTouch U-100) 100 unit/mL (3 mL) inpn 2021 Self Yes   Si Units by SubCUTAneous route every evening. losartan (COZAAR) 50 mg tablet 2021  Yes   Sig: Take 50 mg by mouth nightly. Pt does not recall dose    rOPINIRole (Requip) 1 mg tablet 2021 Self Yes   Sig: Take 1 mg by mouth nightly. rosuvastatin (CRESTOR) 10 mg tablet 2021 Self Yes   Sig: Take 10 mg by mouth nightly. Facility-Administered Medications: None         Please contact the main inpatient pharmacy with any questions or concerns at [phone] and we will direct you to the clinical pharmacist covering this patient's care while in-house.    Anastasiia Anne, JeannetteD, BCPS

## 2021-08-31 NOTE — PROGRESS NOTES
Spiritual Care Assessment/Progress Note  [address]      NAME: Pal Carlos      MRN: [id number]  AGE: 73 y.o. SEX: male  Catholic Affiliation: Jehovah's witness   Language: English     8/31/2021     Total Time (in minutes): 27     Spiritual Assessment begun in [facility] EMERGENCY DEPT through conversation with:         [x]Patient        [x] Family    [] Friend(s)        Reason for Consult: Crisis (Code Stroke)     Spiritual beliefs: (Please include comment if needed)     [x] Identifies with a roscoe tradition:    Jehovah's witness      [] Supported by a roscoe community:       None      [] Claims no spiritual orientation:           [] Seeking spiritual identity:                [] Adheres to an individual form of spirituality:           [] Not able to assess:                           Identified resources for coping:      [x] Prayer                               [] Music                  [] Guided Imagery     [x] Family/friends                 [] Pet visits     [] Devotional reading                         [] Unknown     [] Other:                                               Interventions offered during this visit: (See comments for more details)    Patient Interventions: Affirmation of emotions/emotional suffering, Affirmation of roscoe, Catharsis/review of pertinent events in supportive environment, Initial/Spiritual assessment, patient floor, Life review/legacy, Normalization of emotional/spiritual concerns, Prayer (assurance of)     Family/Friend(s):  Affirmation of emotions/emotional suffering, Affirmation of roscoe, Catharsis/review of pertinent events in supportive environment, Initial Assessment, Life review/legacy, Normalization of emotional/spiritual concerns, Prayer (assurance of)     Plan of Care:     [] Support spiritual and/or cultural needs    [] Support AMD and/or advance care planning process      [] Support grieving process   [] Coordinate Rites and/or Rituals    [] Coordination with community clergy   [] No spiritual needs identified at this time   [] Detailed Plan of Care below (See Comments)  [] Make referral to Music Therapy  [] Make referral to Pet Therapy     [] Make referral to Addiction services  [] Make referral to [facility]  [] Make referral to Spiritual Care Partner  [] No future visits requested        [x] Follow up upon further referrals     Comments:   Code Stroke page: Shelia Moon arrived to Emergency Department (ED) room 10 and saw staff taking the patient, Mr. Mily Zapata, out for testing. Mr. Gardner Jurdavid wife, Tiki Melvin, was in the room and  spent time speaking and listening to her. Tiki Melvin shared a little about her husbands life. She was concerned of course but did not seem fearful. She informed  that they were [facility] but do not attend anywhere. When Mr. Stephenie Norman was back in the room,  stopped in again to greet him. He spoke and seemed normal and even made light of his situation. As  informed them that our services are here for them, they thanked . Advised nurse to contact [facility] for any further referrals.     Chaplain Ghulam Judd M.Div.  Citlali Pascual ([id number])  \

## 2021-09-01 LAB
ALBUMIN SERPL-MCNC: 3.2 G/DL (ref 3.5–5)
ALBUMIN/GLOB SERPL: 1 {RATIO} (ref 1.1–2.2)
ALP SERPL-CCNC: 109 U/L (ref 45–117)
ALT SERPL-CCNC: 18 U/L (ref 12–78)
ANION GAP SERPL CALC-SCNC: 5 MMOL/L (ref 5–15)
AST SERPL-CCNC: 9 U/L (ref 15–37)
BILIRUB SERPL-MCNC: 0.4 MG/DL (ref 0.2–1)
BUN SERPL-MCNC: 22 MG/DL (ref 6–20)
BUN/CREAT SERPL: 24 (ref 12–20)
CALCIUM SERPL-MCNC: 8.3 MG/DL (ref 8.5–10.1)
CHLORIDE SERPL-SCNC: 112 MMOL/L (ref 97–108)
CO2 SERPL-SCNC: 25 MMOL/L (ref 21–32)
CREAT SERPL-MCNC: 0.91 MG/DL (ref 0.7–1.3)
ERYTHROCYTE [DISTWIDTH] IN BLOOD BY AUTOMATED COUNT: 12.8 % (ref 11.5–14.5)
GLOBULIN SER CALC-MCNC: 3.1 G/DL (ref 2–4)
GLUCOSE BLD STRIP.AUTO-MCNC: 107 MG/DL (ref 65–117)
GLUCOSE BLD STRIP.AUTO-MCNC: 119 MG/DL (ref 65–117)
GLUCOSE BLD STRIP.AUTO-MCNC: 137 MG/DL (ref 65–117)
GLUCOSE BLD STRIP.AUTO-MCNC: 81 MG/DL (ref 65–117)
GLUCOSE SERPL-MCNC: 116 MG/DL (ref 65–100)
HCT VFR BLD AUTO: 42.3 % (ref 36.6–50.3)
HGB BLD-MCNC: 13.9 G/DL (ref 12.1–17)
MAGNESIUM SERPL-MCNC: 2.2 MG/DL (ref 1.6–2.4)
MCH RBC QN AUTO: 29.5 PG (ref 26–34)
MCHC RBC AUTO-ENTMCNC: 32.9 G/DL (ref 30–36.5)
MCV RBC AUTO: 89.8 FL (ref 80–99)
NRBC # BLD: 0 K/UL (ref 0–0.01)
NRBC BLD-RTO: 0 PER 100 WBC
PLATELET # BLD AUTO: 234 K/UL (ref 150–400)
PMV BLD AUTO: 10.4 FL (ref 8.9–12.9)
POTASSIUM SERPL-SCNC: 3.6 MMOL/L (ref 3.5–5.1)
PROT SERPL-MCNC: 6.3 G/DL (ref 6.4–8.2)
RBC # BLD AUTO: 4.71 M/UL (ref 4.1–5.7)
SERVICE CMNT-IMP: ABNORMAL
SERVICE CMNT-IMP: ABNORMAL
SERVICE CMNT-IMP: NORMAL
SERVICE CMNT-IMP: NORMAL
SODIUM SERPL-SCNC: 142 MMOL/L (ref 136–145)
WBC # BLD AUTO: 8.2 K/UL (ref 4.1–11.1)

## 2021-09-01 PROCEDURE — 97530 THERAPEUTIC ACTIVITIES: CPT

## 2021-09-01 PROCEDURE — 80053 COMPREHEN METABOLIC PANEL: CPT

## 2021-09-01 PROCEDURE — 82962 GLUCOSE BLOOD TEST: CPT

## 2021-09-01 PROCEDURE — 97165 OT EVAL LOW COMPLEX 30 MIN: CPT

## 2021-09-01 PROCEDURE — 36415 COLL VENOUS BLD VENIPUNCTURE: CPT

## 2021-09-01 PROCEDURE — 99218 HC RM OBSERVATION: CPT

## 2021-09-01 PROCEDURE — 97161 PT EVAL LOW COMPLEX 20 MIN: CPT

## 2021-09-01 PROCEDURE — 74011250637 HC RX REV CODE- 250/637: Performed by: INTERNAL MEDICINE

## 2021-09-01 PROCEDURE — 74011250637 HC RX REV CODE- 250/637: Performed by: PSYCHIATRY & NEUROLOGY

## 2021-09-01 PROCEDURE — 74011250636 HC RX REV CODE- 250/636: Performed by: INTERNAL MEDICINE

## 2021-09-01 PROCEDURE — 97535 SELF CARE MNGMENT TRAINING: CPT

## 2021-09-01 PROCEDURE — 74011636637 HC RX REV CODE- 636/637: Performed by: INTERNAL MEDICINE

## 2021-09-01 PROCEDURE — 99205 OFFICE O/P NEW HI 60 MIN: CPT | Performed by: PSYCHIATRY & NEUROLOGY

## 2021-09-01 PROCEDURE — 97116 GAIT TRAINING THERAPY: CPT

## 2021-09-01 PROCEDURE — 83735 ASSAY OF MAGNESIUM: CPT

## 2021-09-01 PROCEDURE — 96372 THER/PROPH/DIAG INJ SC/IM: CPT

## 2021-09-01 PROCEDURE — 85027 COMPLETE CBC AUTOMATED: CPT

## 2021-09-01 RX ORDER — ROSUVASTATIN CALCIUM 10 MG/1
20 TABLET, COATED ORAL
Status: DISCONTINUED | OUTPATIENT
Start: 2021-09-01 | End: 2021-09-02 | Stop reason: HOSPADM

## 2021-09-01 RX ORDER — GUAIFENESIN 100 MG/5ML
81 LIQUID (ML) ORAL DAILY
Status: DISCONTINUED | OUTPATIENT
Start: 2021-09-01 | End: 2021-09-02 | Stop reason: HOSPADM

## 2021-09-01 RX ADMIN — Medication 10 ML: at 22:00

## 2021-09-01 RX ADMIN — Medication 10 ML: at 05:12

## 2021-09-01 RX ADMIN — CARVEDILOL 6.25 MG: 12.5 TABLET, FILM COATED ORAL at 17:30

## 2021-09-01 RX ADMIN — ROPINIROLE HYDROCHLORIDE 1 MG: 1 TABLET, FILM COATED ORAL at 20:31

## 2021-09-01 RX ADMIN — ENOXAPARIN SODIUM 40 MG: 40 INJECTION SUBCUTANEOUS at 12:30

## 2021-09-01 RX ADMIN — ASPIRIN 81 MG: 81 TABLET, CHEWABLE ORAL at 12:30

## 2021-09-01 RX ADMIN — CARVEDILOL 6.25 MG: 12.5 TABLET, FILM COATED ORAL at 08:23

## 2021-09-01 RX ADMIN — ROSUVASTATIN CALCIUM 20 MG: 10 TABLET, FILM COATED ORAL at 21:48

## 2021-09-01 RX ADMIN — DULOXETINE HYDROCHLORIDE 120 MG: 30 CAPSULE, DELAYED RELEASE ORAL at 21:48

## 2021-09-01 RX ADMIN — INSULIN GLARGINE 50 UNITS: 100 INJECTION, SOLUTION SUBCUTANEOUS at 21:48

## 2021-09-01 RX ADMIN — Medication 10 ML: at 17:30

## 2021-09-01 NOTE — PROGRESS NOTES
OCCUPATIONAL THERAPY EVALUATION/DISCHARGE  Patient: Beena Valle (89 y.o. male)  Date: 9/1/2021  Primary Diagnosis: Right sided weakness [R53.1]       Precautions:  Fall    ASSESSMENT  Based on the objective data described below, the patient presents with good overall activity tolerance following admission for R side weakness. Patient is undergoing neurological work-up; CT was negative for acute process and MRI is pending. Patient with baseline UE sensory and motor impairments in the C7-T1 distribution. Additionally, he recently underwent C5-C7 ACDF. Patient confirms his deficits were premorbid and now back to baseline. Patient performed transfers and ADLs without LOB or physical assistance needed. Patient has no further skilled OT needs and is cleared from OT services at this time. Current Level of Function (ADLs/self-care): Patient is independent with ADLs and functional mobility. Functional Outcome Measure: The patient scored Total A-D  Total A-D (Motor Function): 66/66 on the Fugl-Enriquez Assessment which is indicative of no impairment in upper extremity functional status. PLAN :  Recommendation for discharge: (in order for the patient to meet his/her long term goals)  Outpatient for premorbid UE weakness     This discharge recommendation:  Has been made in collaboration with the attending provider and/or case management    IF patient discharges home will need the following DME:none       SUBJECTIVE:   Patient agreeable to OT evaluation.     OBJECTIVE DATA SUMMARY:   HISTORY:   Past Medical History:   Diagnosis Date    Anxiety and depression     Carbuncle and furuncle of neck 2015    CHF (congestive heart failure), NYHA class I, chronic, diastolic (HCC)     [id number] vaccine series completed     [facility]    Hypertension     ICD (implantable cardioverter-defibrillator) in place     Infection of skin due to methicillin resistant Staphylococcus aureus (MRSA) 2015    Restless leg syndrome  Type 2 diabetes mellitus ([facility] [address]     Unspecified sleep apnea     lost weight     Past Surgical History:   Procedure Laterality Date    HX CYST INCISION AND DRAINAGE Right 10/13/2020    Hand-     HX CYST REMOVAL  2015    Boils off of back of neck- with infection    HX FREE SKIN GRAFT  2015    HX ORTHOPAEDIC      bone spur right foot.  HX PACEMAKER      AICD    HX UROLOGICAL      hydrocele repair left testicle    HX WISDOM TEETH EXTRACTION         Prior Level of Function/Environment/Context: Patient lives with family. Expanded or extensive additional review of patient history:     Home Situation  Home Environment: Private residence  # Steps to Enter: 5  Rails to Enter: Yes  Hand Rails : Bilateral  Wheelchair Ramp: Yes  One/Two Story Residence: Two story  # of Interior Steps: 12  Interior Rails: Right  Lift Chair Available: No  Living Alone: No  Support Systems: Spouse/Significant Other/Partner  Patient Expects to be Discharged to[de-identified] [employer]  Current DME Used/Available at Home: Cane, straight  Tub or Shower Type: Tub/Shower combination    Hand dominance: Right    EXAMINATION OF PERFORMANCE DEFICITS:  Cognitive/Behavioral Status:  Neurologic State: Alert  Orientation Level: Oriented X4  Cognition: Appropriate decision making; Appropriate for age attention/concentration; Appropriate safety awareness  Perception: Appears intact  Perseveration: No perseveration noted  Safety/Judgement: Awareness of environment    Skin: Intact in the uppers    Edema: None noted in the uppers    Hearing: Auditory  Auditory Impairment: None    Vision/Perceptual:    Tracking: Able to track stimulus in all quadrants w/o difficulty    Saccades: Within Defined Limits    Convergence: Normal (within 6 inches from nose)    Visual Fields:  (WDL)  Diplopia: No    Acuity: Able to read clock/calendar on wall without difficulty; Able to read employee name badge without difficulty; Within Defined Limits         Range of Motion:  AROM: Within functional limits  PROM: Within functional limits    Strength:  Strength: Generally decreased, functional in the uppers    Coordination:  Fine Motor Skills-Upper: Left Intact; Right Intact    Gross Motor Skills-Upper: Left Intact; Right Intact    Tone :  Tone: Normal    Balance:  Sitting: Intact  Standing: Intact    Functional Mobility and Transfers for ADLs:  Bed Mobility:  Rolling: Independent  Supine to Sit: Independent  Sit to Supine: Independent    Transfers:  Sit to Stand: Independent  Stand to Sit: Independent  Bed to Chair: Independent  Bathroom Mobility: Independent  Toilet Transfer : Independent    ADL Assessment:  Feeding: Independent    Oral Facial Hygiene/Grooming: Independent    Bathing: Independent    Upper Body Dressing: Independent    Lower Body Dressing: Independent    Toileting: Independent    Cognitive Retraining  Safety/Judgement: Awareness of environment      Functional Measure:  Fugl-Enriquez Assessment of Motor Recovery after Stroke:   Reflex Activity  Flexors/Biceps/Fingers: Can be elicited  Extensors/Triceps: Can be elicited  Reflex Subtotal: 4    Volitional Movement Within Synergies  Shoulder Retraction: Full  Shoulder Elevation: Full  Shoulder Abduction (90 degrees): Full  Shoulder External Rotation: Full  Elbow Flexion: Full  Forearm Supination: Full  Shoulder Adduction/Internal Rotation: Full  Elbow Extension: Full  Forearm Pronation: Full  Subtotal: 18    Volitional Movement Mixing Synergies  Hand to Lumbar Spine: Full  Shoulder Flexion (0-90 degrees): Full  Pronation-Supination: Full  Subtotal: 6    Volitional Movement With Little or No Synergy  Shoulder Abduction (0-90 degrees): Full  Shoulder Flexion ( degrees): Full  Pronation/Supination: Full  Subtotal : 6    Normal Reflex Activity  Biceps, Triceps, Finger Flexors:  Full  Subtotal : 2    Upper Extremity Total   Upper Extremity Total: 36    Wrist  Stability at 15 Degree Dorsiflexion: Full  Repeated Dorsiflexion/ Volar Flexion: Full  Stability at 15 Degree Dorsiflexion: Full  Repeated Dorsiflexion/ Volar Flexion: Full  Circumduction: Full  Wrist Total: 10    Hand  Mass Flexion: Full  Mass Extension: Full  Grasp A: Full  Grasp B: Full  Grasp C: Full  Grasp D: Full  Grasp E: Full  Hand Total: 14    Coordination/Speed  Tremor: None  Dysmetria: None  Time: <1s  Coordination/Speed Total : 6    Total A-D  Total A-D (Motor Function): 66/66     This is a reliable/valid measure of arm function after a neurological event. It has established value to characterize functional status and for measuring spontaneous and therapy-induced recovery; tests proximal and distal motor functions. Fugl-Enriquze Assessment - UE scores recorded between five and 30 days post neurologic event can be used to predict UE recovery at six months post neurologic event. Severe = 0-21 points   Moderately Severe = 22-33 points   Moderate = 34-47 points   Mild = 48-66 points  SALLY Sousa, KHURRAM Rocha, & MOY Padgett (1992). Measurement of motor recovery after stroke: Outcome assessment and sample size requirements.  Stroke, 23, pp. 8972-9319.   ------------------------------------------------------------------------------------------------------------------------------------------------------------------  MCID:  Stroke:   Jazz Fulton et al, 2001; n = 171; mean age 79 (6) years; assessed within 16 (12) days of stroke, Acute Stroke)  FMA Motor Scores from Admission to Discharge   10 point increase in FMA Upper Extremity = 1.5 change in discharge FIM   10 point increase in FMA Lower Extremity = 1.9 change in discharge FIM  MDC:   Stroke:   Nathalie Nguyen et al, 2008, n = 14, mean age = 59.9 (14.6) years, assessed on average 14 (6.5) months post stroke, Chronic Stroke)   FMA = 5.2 points for the Upper Extremity portion of the assessment     Occupational Therapy Evaluation Charge Determination   History Examination Decision-Making   LOW Complexity : Brief history review LOW Complexity : 1-3 performance deficits relating to physical, cognitive , or psychosocial skils that result in activity limitations and / or participation restrictions  LOW Complexity : No comorbidities that affect functional and no verbal or physical assistance needed to complete eval tasks       Based on the above components, the patient evaluation is determined to be of the following complexity level: LOW     Activity Tolerance:   Good    After treatment patient left in no apparent distress:    Sitting in chair, Call bell within reach and Bed / chair alarm activated    COMMUNICATION/EDUCATION:   The patients plan of care was discussed with: Physical therapist, Registered nurse and patient. .     Patient was educated regarding his deficit(s) on admission as this relates to his  proposed diagnosis of CVA. He demonstrated Good understanding as evidenced by teach back. Patient and/or family was verbally educated on the BE FAST acronym for signs/symptoms of CVA and TIA. Informed patient to refer to the Stroke Binder for further BE FAST information. All questions answered with patient indicating good understanding.      Thank you for this referral.  George Ricci, OTR/L  Time Calculation: 37 mins

## 2021-09-01 NOTE — PROGRESS NOTES
1:23 PM    RUR:  N/A- Observation Status    Transition of Care Note:  1). Patient continues to require medical management  2). Neurology consulted- MRI scheduled  3). Cardiology consulted  4). PT consulted- recommend Outpatient PT  5).  CM will continue to follow for dc needs    [facility]

## 2021-09-01 NOTE — PROGRESS NOTES
PHYSICAL THERAPY EVALUATION/DISCHARGE  Patient: Obinna Whyte (48 y.o. male)  Date: 9/1/2021  Primary Diagnosis: Right sided weakness [R53.1]       Precautions:   Fall      ASSESSMENT  Based on the objective data described below, the patient presents with decreased right UE strength that is now resolving. Patient underwent CT which was negative for an acute infarct, he is awaiting a MRI. He has premorbid thenar wasting and UE deficits (especially ) deficits in the C7-T1 distribution and recently underwent C5-6 ACDF. Patient also with history of neuropathy in bilateral feet. He today is presenting with his baseline of weakness in the UE and none in the LE. He was able to ambulate in the hallway without loss of balance or instability. Recommend outpatient PT for his UE weakness and sensory deficits following his neck surgery. .    Functional Outcome Measure: The patient scored Total: 47/56 on the Daly Balance Assessment which is indicative of low fall risk. Other factors to consider for discharge: reports diffiiculty with opening jars, grabbing cups, etc     Further skilled acute physical therapy is not indicated at this time.      PLAN :  Recommendation for discharge: (in order for the patient to meet his/her long term goals)  Outpatient physical therapy follow up recommended for UE cervical    This discharge recommendation:  A follow-up discussion with the attending provider and/or case management is planned    IF patient discharges home will need the following DME: to be determined (TBD)         SUBJECTIVE:   Patient stated .    OBJECTIVE DATA SUMMARY:   HISTORY:    Past Medical History:   Diagnosis Date    Anxiety and depression     Carbuncle and furuncle of neck 2015    CHF (congestive heart failure), NYHA class I, chronic, diastolic ([facility] [address]     [id number] vaccine series completed     [facility]    Hypertension     ICD (implantable cardioverter-defibrillator) in place     Infection of skin due to methicillin resistant Staphylococcus aureus (MRSA) 2015    Restless leg syndrome     Type 2 diabetes mellitus ([facility] [address]     Unspecified sleep apnea     lost weight     Past Surgical History:   Procedure Laterality Date    HX CYST INCISION AND DRAINAGE Right 10/13/2020    Hand-     HX CYST REMOVAL  2015    Boils off of back of neck- with infection    HX FREE SKIN GRAFT  2015    HX ORTHOPAEDIC      bone spur right foot.  HX PACEMAKER      AICD    HX UROLOGICAL      hydrocele repair left testicle    HX WISDOM TEETH EXTRACTION         Prior level of function: see above  Personal factors and/or comorbidities impacting plan of care: see below    Home Situation  Home Environment: Private residence  # Steps to Enter: 5  Rails to Enter: Yes  Hand Rails : Bilateral  Wheelchair Ramp: Yes  One/Two Story Residence: Two story  # of Interior Steps: 12  Interior Rails: Right  Lift Chair Available: No  Living Alone: No  Support Systems: Spouse/Significant Other/Partner  Patient Expects to be Discharged to[de-identified] [employer]  Current DME Used/Available at Home: Cane, straight    EXAMINATION/PRESENTATION/DECISION MAKING:   Critical Behavior:  Neurologic State: Alert  Orientation Level: Oriented X4  Cognition: Follows commands     Hearing:   Auditory  Auditory Impairment: None  Skin:  All exposed intact  Edema: none noted  Range Of Motion:  AROM: Within functional limits           PROM: Within functional limits           Strength:    Strength: Generally decreased, functional                    Tone & Sensation:   Tone: Normal                              Coordination:  Coordination: Within functional limits  Vision:      Functional Mobility:  Bed Mobility:  Rolling: Independent  Supine to Sit: Independent  Sit to Supine: Independent     Transfers:  Sit to Stand: Independent  Stand to Sit: Independent        Bed to Chair: Independent              Balance:      Ambulation/Gait Training:  Distance (ft): 250 Feet (ft)  Assistive Device: Gait belt  Ambulation - Level of Assistance: Independent     Gait Description (WDL): Exceptions to [facility]                  Functional Measure  De Paz Balance Test:    Sitting to Standin  Standing Unsupported: 4  Sitting with Back Unsupported: 4  Standing to Sittin  Transfers: 4  Standing Unsupported with Eyes Closed: 4  Standing Unsupported with Feet Together: 4  Reach Forward with Outstretched Arm: 4   Object: 4  Turn to Look Over Shoulders: 4  Turn 360 Degrees: 4  Alternate Foot on Step/Stool: 2  Standing Unsupported One Foot in Front: 0  Stand on One Le  Total: 47/56         56=Maximum possible score;   0-20=High fall risk  21-40=Moderate fall risk   41-56=Low fall risk        Physical Therapy Evaluation Charge Determination   History Examination Presentation Decision-Making   HIGH Complexity :3+ comorbidities / personal factors will impact the outcome/ POC  LOW Complexity : 1-2 Standardized tests and measures addressing body structure, function, activity limitation and / or participation in recreation  LOW Complexity : Stable, uncomplicated  Other outcome measures DE PAZ  LOW       Based on the above components, the patient evaluation is determined to be of the following complexity level: LOW     Pain Rating:  None reported    Activity Tolerance:   Good    After treatment patient left in no apparent distress:   Supine in bed, Call bell within reach and Bed / chair alarm activated    COMMUNICATION/EDUCATION:   The patients plan of care was discussed with: Registered nurse. Patient was educated regarding his deficit(s) of UE weakness as this relates to his diagnosis of TIA. He demonstrated Fair understanding as evidenced by verbal feedback. Patient and/or family was verbally educated on the BE FAST acronym for signs/symptoms of CVA and TIA. Informed patient to refer to the Stroke Binder for further BE FAST information. All questions answered with patient indicating good understanding.      Fall prevention education was provided and the patient/caregiver indicated understanding., Patient/family have participated as able in goal setting and plan of care. and Patient/family agree to work toward stated goals and plan of care.     Thank you for this referral.  Roseanna Avendaño, PT, DPT   Time Calculation: 27 mins

## 2021-09-01 NOTE — PROGRESS NOTES
Bedside shift change report given to Brock (oncoming nurse) by [employer] nurse). Report included the following information SBAR, Kardex, ED Summary, Intake/Output, and Recent Results. Bedside shift change report given to RN (oncoming nurse) by Brock (offgoing nurse). Report included the following information SBAR, Kardex, ED Summary, Intake/Output, and Recent Results. Stroke Education provided to patient and the following topics were discussed     1. Patients personal risk factors for stroke are hypertension, smoking, hyperlipidemia, diabetes mellitus, elevated HgA1C and CHF     2. Warning signs of Stroke:         * Sudden numbness or weakness of the face, arm or leg, especially on one side of          The body            * Sudden confusion, trouble speaking or understanding         * Sudden trouble seeing in one or both eyes         * Sudden trouble walking, dizziness, loss of balance or coordination         * Sudden severe headache with no known cause        3. Importance of activation Emergency Medical Services ( 9-1-1 ) immediately if experience any warning signs of stroke.     4. Be sure and schedule a follow-up appointment with your primary care doctor or any specialists as instructed.      5. You must take medicine every day to treat your risk factors for stroke.  Be sure to take your medicines exactly as your doctor tells you: no more, no less.  Know what your medicines are for , what they do.  Anti-thrombotics /anticoagulants can help prevent strokes.  You are taking the following medicine(s)  aspirin 81 mg     6.  Smoking and second-hand smoke greatly increase your risk of stroke, cardiovascular disease and death. Smoking history packs, 1/4 packs per day     7. Information provided was BS Stroke Education Binder     8. Documentation of teaching completed in Patient Education Activity and on Care Plan with teaching response noted?   Yes    This patient was assisted with Intentional Toileting every 2 hours during this shift as appropriate. Documentation of ambulation and output reflected on Flowsheet as appropriate. Purposeful hourly rounding was completed using AIDET and 5Ps. Outcomes of PHR documented as they occurred. Bed alarm in use as appropriate. Dual Suction and ambubag in place. Bedside shift change report given to Valencia(oncoming nurse) by Vivien Rosa (offgoing nurse). Report included the following information SBAR, Kardex, ED Summary, Intake/Output, and Recent Results. This patient was assisted with Intentional Toileting every 2 hours during this shift as appropriate. Documentation of ambulation and output reflected on Flowsheet as appropriate. Purposeful hourly rounding was completed using AIDET and 5Ps. Outcomes of PHR documented as they occurred. Bed alarm in use as appropriate. Dual Suction and ambubag in place.

## 2021-09-01 NOTE — PROGRESS NOTES
Orders received, chart reviewed and patient evaluated by physical therapy. Pending progression with skilled acute physical therapy, recommend:  Outpatient physical therapy follow up recommended for orthopedic cervical/UE weakness    Recommend with nursing OOB to chair 3x/day. Full evaluation to follow.    Dell Campbell PT,DPT,NCS

## 2021-09-01 NOTE — ED NOTES
TRANSFER - OUT REPORT:    Verbal report given to VICKI Shelton(name) on Wu Nicole  being transferred to [facility](unit) for routine progression of care       Report consisted of patients Situation, Background, Assessment and   Recommendations(SBAR). Information from the following report(s) SBAR, Kardex, ED Summary, [facility] and Recent Results was reviewed with the receiving nurse. Lines:   Peripheral IV 08/31/21 Left Antecubital (Active)        Opportunity for questions and clarification was provided.       Patient transported with:   Monitor  Registered Nurse

## 2021-09-01 NOTE — CONSULTS
[facility] and [address] at [facility] at [address] [place] [address], [address] [place] [address] [address]  [phone] Office  [phone] Facsimile           Referring: Dr. Lindsey Cabral      Chief Complaint   Patient presents with    Extremity Weakness     Right hand, Right foot     23-year-old gentleman were asked to see regarding right-sided weakness. Patient presented to the emergency department with complaints of right arm and leg weakness starting around 3 AM on the 31st.  His last known normal was said to be 10 PM.  No facial asymmetry or slurred speech. He has baseline weakness and atrophy from previous cervical stenosis and he status post C5-C7 anterior cervical discectomy and fusion from June 8 of this year. In any regard in the emergency department he underwent CT CTA and CT perfusion. Those were unremarkable. MRI is pending as he has a pacemaker. Dr. Dayana Zheng did note this morning the patient has an MRI compatible pacemaker and he noted the model number etc. in the electronic medical record. Hemoglobin A1c 8.7 down from a high of 10.9 in April of this year    Patient was on a very low-dose of Crestor at home  He tells me that he used to take aspirin at home regularly but he had to go off for the surgery and he has not been taking any aspirin at home at this point. He notes that he awakened about 3 AM on the day of admission and he went to check the news on his phone. He has baseline weakness in his hands from his cervical myelopathy however the notes that he was unable to move his thumb and use his fingers properly to work his phone. Also he had some decreased sensation in the right lower extremity. He was able to walk with says that the leg just did not feel right.     Past Medical History:   Diagnosis Date    Anxiety and depression     Carbuncle and furuncle of neck 2015    CHF (congestive heart failure), NYHA class I, chronic, diastolic (HCC)     BPBYN-[id number] vaccine series completed     [facility]    Hypertension     ICD (implantable cardioverter-defibrillator) in place     Infection of skin due to methicillin resistant Staphylococcus aureus (MRSA) 2015    Restless leg syndrome     Type 2 diabetes mellitus ([facility] [address]     Unspecified sleep apnea     lost weight       Past Surgical History:   Procedure Laterality Date    HX CYST INCISION AND DRAINAGE Right 10/13/2020    Hand-     HX CYST REMOVAL  2015    Boils off of back of neck- with infection    HX FREE SKIN GRAFT  2015    HX ORTHOPAEDIC      bone spur right foot.     HX PACEMAKER      AICD    HX UROLOGICAL      hydrocele repair left testicle    HX WISDOM TEETH EXTRACTION         Current Facility-Administered Medications   Medication Dose Route Frequency Provider Last Rate Last Admin    carvediloL (COREG) tablet 6.25 mg  6.25 mg Oral BID Vi Fox MD   6.25 mg at 09/01/21 1513    DULoxetine (CYMBALTA) capsule 120 mg  120 mg Oral QHS Vi Fox MD   120 mg at 08/31/21 2205    insulin glargine (LANTUS) injection 50 Units  50 Units SubCUTAneous QHS Vi Fox MD   50 Units at 08/31/21 2219    rOPINIRole (REQUIP) tablet 1 mg  1 mg Oral QHS Vi Fox MD   1 mg at 08/31/21 2219    rosuvastatin (CRESTOR) tablet 10 mg  10 mg Oral QHS Vi Fox MD   10 mg at 08/31/21 2205    glucose chewable tablet 16 g  4 Tablet Oral PRN Vi Fox MD        dextrose (D50W) injection syrg 12.5-25 g  25-50 mL IntraVENous PRN Vi Fox MD        glucagon [facility]) injection 1 mg  1 mg IntraMUSCular PRN Vi Fox MD        sodium chloride (NS) flush 5-40 mL  5-40 mL IntraVENous Q8H Vi Fox MD   10 mL at 09/01/21 0512    sodium chloride (NS) flush 5-40 mL  5-40 mL IntraVENous PRN Vi Fox MD        acetaminophen (TYLENOL) tablet 650 mg  650 mg Oral Q6H PRN Oliva Ruiz MD        Or   Edmundo Patterson acetaminophen (TYLENOL) suppository 650 mg  650 mg Rectal Q6H PRN Oliva Ruiz MD        polyethylene glycol [facility]) packet 17 g  17 g Oral DAILY PRN Oliva Ruiz MD        ondansetron (ZOFRAN ODT) tablet 4 mg  4 mg Oral Q8H PRN Oliva Ruiz MD        Or    ondansetron [facility]) injection 4 mg  4 mg IntraVENous Q6H PRN Oliva Ruiz MD        insulin lispro (HUMALOG) injection   SubCUTAneous AC&HS Oliva Ruiz MD        enoxaparin (LOVENOX) injection 40 mg  40 mg SubCUTAneous Q24H Oliva Ruiz MD   40 mg at 08/31/21 1321        Allergies   Allergen Reactions    Penicillins Hives       Social History     Tobacco Use    Smoking status: Light Tobacco Smoker     Packs/day: 0.25     Years: 20.00     Pack years: 5.00    Smokeless tobacco: Never Used    Tobacco comment: cigars   Substance Use Topics    Alcohol use: Not Currently     Alcohol/week: 3.3 standard drinks     Types: 4 Cans of beer per week    Drug use: No       No family history on file. Review of Systems  Pertinent positives and negatives as noted. Examination  Visit Vitals  /77 (BP 1 Location: Right upper arm, BP Patient Position: At rest)   Pulse 78   Temp 98 °F (36.7 °C)   Resp 18   Ht 6' (1.829 m)   Wt 113.1 kg (249 lb 6.4 oz)   SpO2 100%   BMI 33.82 kg/m²   He is a pleasant gentleman. He is awake alert oriented and conversant. Speech and language normal.  Cognition normal.  Cranial nerves intact 2-12 with the exception of flattening of the right nasolabial fold. He has no pronation or drift. He has significant muscle wasting in the hands. Strength in the left upper and lower full throughout. In the right he is full at the deltoid, 4/5 at the tricep, 4+/5 at the bicep and his  is weak. Intrinsic hand muscles are weak bilaterally right greater than left. Reflexes brisk right upper and lower versus left.   No ataxia. Impression/Plan  29-year-old gentleman with right-sided weakness improved today however examination does still find features of nasolabial fold asymmetry/facial droop as well as right-sided weakness and certainly has risk factors for stroke  He likely has had a small subcortical infarct and will await the MRI  I placed him back on aspirin 81 mg  Increase Crestor to a dose of 20 mg  Stroke teaching in process and therapy is with him working right now  Modify modifiable risk factors including his blood sugars and his hemoglobin A1c needs to be dropped below 7 as an outpatient  Here keep him euglycemic      Dontrell Bauman MD          This note was created using voice recognition software. Despite editing, there may be syntax errors.

## 2021-09-01 NOTE — PROGRESS NOTES
Stroke Education provided to patient and the following topics were discussed    1. Patients personal risk factors for stroke are hypertension and diabetes mellitus    2. Warning signs of Stroke:        * Sudden numbness or weakness of the face, arm or leg, especially on one side of          The body            * Sudden confusion, trouble speaking or understanding        * Sudden trouble seeing in one or both eyes        * Sudden trouble walking, dizziness, loss of balance or coordination        * Sudden severe headache with no known cause      3. Importance of activation Emergency Medical Services ( 9-1-1 ) immediately if experience any warning signs of stroke. 4. Be sure and schedule a follow-up appointment with your primary care doctor or any specialists as instructed. 5. You must take medicine every day to treat your risk factors for stroke. Be sure to take your medicines exactly as your doctor tells you: no more, no less. Know what your medicines are for , what they do. Anti-thrombotics /anticoagulants can help prevent strokes. You are taking the following medicine(s)  lovenox     6. Smoking and second-hand smoke greatly increase your risk of stroke, cardiovascular disease and death. Smoking history never    7. Information provided was BS Stroke Education Binder    8. Documentation of teaching completed in Patient Education Activity and on Care Plan with teaching response noted?   yes

## 2021-09-01 NOTE — PROGRESS NOTES
Marcio [facility] [address]  [address] [facility] [place], [address] [facility]  [phone]      Medical Progress Note      NAME: Riri Marie   :  1955  MRM:  [id number]    Date of service: 2021  6:52 AM       Assessment and Plan:   1. Right hand weakness -pt moves his fingers better this morning. (unlikely trigger finger( no clicking on flexing and extending, has no pain) Neurochecks. Check MRI brain and C spine ( cardiology defined ICD is compatible with MRI). Prior ECHO heart without PFO. Check Lipids. On ASA. Fall precautions and PT/OT eval.     2.  Cervical stenosis of spinal canal - possible a reason for the R side weakness. MRI as above. Cervical MRI.  evaluated by orthopedics       3. CHF (congestive heart failure), NYHA class I, chronic, diastolic / ICD (implantable cardioverter-defibrillator) in place / HTN (hypertension) - stable. Resume coreg and rosuvastatin, but hold bumex       4. Type II diabetes mellitus - Diabetic diet and counseling. SSI per protocol. Continue home long acting insulin. A1c 8.7     5. Obese / Sleep apnea - advise kam loss and CEASAR evaluation.     6.  Restless leg syndrome - Requip     7. Anxiety and depression - Continue duloxetine            Subjective:     Chief Complaint[de-identified] Patient was seen and examined as a follow up for RT side weakness. Chart was reviewed. felt better     ROS:  (bold if positive, if negative)    Tolerating PT  Tolerating Diet        Objective:     Last 24hrs VS reviewed since prior progress note.  Most recent are:    Visit Vitals  /80 (BP 1 Location: Right upper arm)   Pulse 77   Temp 97.3 °F (36.3 °C)   Resp 18   Ht 6' (1.829 m)   Wt 113.1 kg (249 lb 6.4 oz)   SpO2 98%   BMI 33.82 kg/m²     SpO2 Readings from Last 6 Encounters:   21 98%   21 97%   21 100%   21 97%   10/16/20 95%   14 93%            Intake/Output Summary (Last 24 hours) at 2021 0291  Last data filed at 2021 0207  Gross per 24 hour   Intake 120 ml   Output --   Net 120 ml        Physical Exam:    Gen:  Well-developed, well-nourished, in no acute distress  HEENT:  Pink conjunctivae, PERRL, hearing intact to voice, moist mucous membranes  Neck:  Supple, without masses, thyroid non-tender  Resp:  No accessory muscle use, clear breath sounds without wheezes rales or rhonchi  Card:  No murmurs, normal S1, S2 without thrills, bruits or peripheral edema  Abd:  Soft, non-tender, non-distended, normoactive bowel sounds are present, no palpable organomegaly and no detectable hernias  Lymph:  No cervical or inguinal adenopathy  Musc:  No cyanosis or clubbing  Skin:  No rashes or ulcers, skin turgor is good  Neuro:  Cranial nerves are grossly intact, no focal motor weakness, follows commands appropriately  Psych:  Good insight, oriented to person, place and time, alert  __________________________________________________________________  Medications Reviewed: (see below)  Medications:     Current Facility-Administered Medications   Medication Dose Route Frequency    carvediloL (COREG) tablet 6.25 mg  6.25 mg Oral BID    DULoxetine (CYMBALTA) capsule 120 mg  120 mg Oral QHS    insulin glargine (LANTUS) injection 50 Units  50 Units SubCUTAneous QHS    rOPINIRole (REQUIP) tablet 1 mg  1 mg Oral QHS    rosuvastatin (CRESTOR) tablet 10 mg  10 mg Oral QHS    glucose chewable tablet 16 g  4 Tablet Oral PRN    dextrose (D50W) injection syrg 12.5-25 g  25-50 mL IntraVENous PRN    glucagon (GLUCAGEN) injection 1 mg  1 mg IntraMUSCular PRN    sodium chloride (NS) flush 5-40 mL  5-40 mL IntraVENous Q8H    sodium chloride (NS) flush 5-40 mL  5-40 mL IntraVENous PRN    acetaminophen (TYLENOL) tablet 650 mg  650 mg Oral Q6H PRN    Or    acetaminophen (TYLENOL) suppository 650 mg  650 mg Rectal Q6H PRN    polyethylene glycol (MIRALAX) packet 17 g  17 g Oral DAILY PRN    ondansetron (ZOFRAN ODT) tablet 4 mg  4 mg Oral Q8H PRN    Or    ondansetron [facility]) injection 4 mg  4 mg IntraVENous Q6H PRN    insulin lispro (HUMALOG) injection   SubCUTAneous AC&HS    enoxaparin (LOVENOX) injection 40 mg  40 mg SubCUTAneous Q24H        Lab Data Reviewed: (see below)  Lab Review:     Recent Labs     09/01/21 0220 08/31/21 0925   WBC 8.2 8.4   HGB 13.9 14.5   HCT 42.3 42.8    219     Recent Labs     09/01/21 0220 08/31/21 0925    142   K 3.6 4.3   * 109*   CO2 25 30   * 162*   BUN 22* 26*   CREA 0.91 0.97   CA 8.3* 8.5   MG 2.2  --    ALB 3.2* 3.4*   TBILI 0.4 0.4   ALT 18 21   INR  --  1.1     Lab Results   Component Value Date/Time    Glucose (POC) 127 (H) 08/31/2021 10:09 PM    Glucose (POC) 112 08/31/2021 04:28 PM    Glucose (POC) 202 (H) 06/09/2021 11:45 AM    Glucose (POC) 134 (H) 06/09/2021 05:50 AM    Glucose (POC) 128 (H) 06/09/2021 12:12 AM     No results for input(s): PH, PCO2, PO2, HCO3, FIO2 in the last 72 hours. Recent Labs     08/31/21 0925   INR 1.1     All Micro Results     None          I have reviewed notes of prior 24hr. Other pertinent lab: Total time spent with patient: 28 I personally reviewed chart, notes, data and current medications in the medical record. I have personally examined and treated the patient at bedside during this period.                  Care Plan discussed with: Patient, Nursing Staff and >50% of time spent in counseling and coordination of care    Discussed:  Care Plan    Prophylaxis:  Lovenox    Disposition:  Home w/Family           ___________________________________________________    Attending Physician: Faviola Santillan MD

## 2021-09-01 NOTE — PROGRESS NOTES
Orthopaedic Progress Note    2021 10:54 AM     Patient: Jorge L Patel MRN: [id number]  SSN: [id number]    YOB: 1955  Age: 73 y.o. Sex: male      Admit date:  2021  Admitting Physician:  Mohsen Griffith MD   Consulting Physician(s): Treatment Team: Attending Provider: Gloria Cardona MD; Consulting Provider: Jessica Gallagher MD; Consulting Provider: DUNG Boland; Consulting Provider: Abhijeet Ndiaye MD; Consulting Provider: Quinn Latif MD; Care Manager: Jesus Cash; Staff Nurse: Dean Denver; Utilization Review: Pavel Kitchen    SUBJECTIVE:     Jorge L Patel is a 73 y.o. male is resting in bed with his feet crossed in the bed. He has improved function of his right thumb today. No complaints of nausea, vomiting, dizziness, lightheadedness, chest pain, or shortness of breath. OBJECTIVE:       Physical Exam:  General: Alert, cooperative, no distress. Respiratory: Respirations unlabored  Neurological:  Neurovascular exam within normal limits. LUE: atrophy of the first webspace. Strength is 5/5 in the LUE throughout  RUE: atrophy of the first webspace much greater than the left. Able to flex and extend at the elbow 5/5. Shoulder abduction: 5/5.  strength 4/5. Negative Hoffmans   S/lt decreased dorsum and volar hand. S/Lt decreased in ulnar distribution. BLE: 5/5 throughout. Thumb ROM intact including opposition, adduction, abduction. Musculoskeletal: Calves soft, supple, non-tender upon palpation.       Vital Signs:        Patient Vitals for the past 8 hrs:   BP Temp Pulse Resp SpO2   21 1200 -- -- 74 -- --   21 1035 124/77 97.4 °F (36.3 °C) 74 16 100 %   21 0954 (!) 140/65 -- 81 -- --   21 0800 -- -- 78 -- --   21 0739 130/77 98 °F (36.7 °C) 78 18 100 %   21 0700 -- -- 79 -- --                                          Temp (24hrs), Av.7 °F (36.5 °C), Min:97.3 °F (36.3 °C), Max:98.1 °F (36.7 °C)      Labs:        Recent Labs     09/01/21  0220 08/31/21  0925 08/31/21 0925   HCT 42.3   < > 42.8   HGB 13.9   < > 14.5   INR  --   --  1.1    < > = values in this interval not displayed. Lab Results   Component Value Date/Time    Sodium 142 09/01/2021 02:20 AM    Potassium 3.6 09/01/2021 02:20 AM    Chloride 112 (H) 09/01/2021 02:20 AM    CO2 25 09/01/2021 02:20 AM    Glucose 116 (H) 09/01/2021 02:20 AM    BUN 22 (H) 09/01/2021 02:20 AM    Creatinine 0.91 09/01/2021 02:20 AM    Calcium 8.3 (L) 09/01/2021 02:20 AM       PT/OT:        Gait  Ambulation - Level of Assistance: Independent  Distance (ft): 250 Feet (ft)  Assistive Device: Gait belt       Patient mobility  Bed Mobility Training  Rolling: Independent  Supine to Sit: Independent  Sit to Supine: Independent  Transfer Training  Sit to Stand: Independent  Stand to Sit: Independent  Bed to Chair: Independent      Gait Training  Assistive Device: Gait belt  Ambulation - Level of Assistance: Independent  Distance (ft): 250 Feet (ft)            ASSESSMENT / PLAN:   Principal Problem:    Right sided weakness (8/31/2021)    Active Problems:    Type II diabetes mellitus ([facility] [address] (7/2/2014)      HTN (hypertension) (7/2/2014)      Sleep apnea ()      Overview: lost weight      CKD (chronic kidney disease) stage 3, GFR 30-59 ml/min ([facility]) ()      Cervical stenosis of spinal canal (6/8/2021)      Restless leg syndrome ()      ICD (implantable cardioverter-defibrillator) in place ()      Anxiety and depression ()      CHF (congestive heart failure), NYHA class I, chronic, diastolic ([facility]) ()       A: 1. Acute on chronic right hand weakness with atrophy of the 1st webspace    P: 1. I do not see anything different on his examination today that would be related to his cervical spine. He has improved thumb motion today. His bilateral atrophy of the 1-2nd webspace deserves further workup by neurology.   This is not related to carpal tunnel compression/media nerve compression. Median nerve compression would have atrophy of the thenar eminence. Will await cervical MRI, but if no significant changes would have him followup outpatient.          Signed By:  Osbaldo Flores, [address] [address]

## 2021-09-01 NOTE — PROGRESS NOTES
TRANSFER - IN REPORT:    Verbal report received from Ei Ei (name) on Sondra Hernandez  being received from ED (unit) for routine progression of care      Report consisted of patients Situation, Background, Assessment and   Recommendations(SBAR). Information from the following report(s) SBAR, Intake/Output, Recent Results and Cardiac Rhythm NSR was reviewed with the receiving nurse. Opportunity for questions and clarification was provided. Assessment completed upon patients arrival to unit and care assumed. This patient was assisted with Intentional Toileting every 2 hours during this shift as appropriate. Documentation of ambulation and output reflected on Flowsheet as appropriate. Purposeful hourly rounding was completed using AIDET and 5Ps. Outcomes of PHR documented as they occurred. Bed alarm in use as appropriate. Dual Suction and ambubag in place. 0730- Bedside and Verbal shift change report given to Donato (oncoming nurse) by Michele Andrade (offgoing nurse). Report included the following information SBAR, Intake/Output, Recent Results and Cardiac Rhythm NSR.

## 2021-09-01 NOTE — PROGRESS NOTES
0700: Bedside shift change report given to [address] [place] [address] Road (oncoming nurse) by Khoi Pascal RN (offgoing nurse). Report included the following information SBAR, Kardex, Procedure Summary, Intake/Output, MAR, Accordion and Cardiac Rhythm NSR. This patient was assisted with Intentional Toileting every 2 hours during this shift as appropriate. Documentation of ambulation and output reflected on Flowsheet as appropriate. Purposeful hourly rounding was completed using AIDET and 5Ps. Outcomes of PHR documented as they occurred. Bed alarm in use as appropriate. Dual Suction and ambubag in place. Stroke Education provided to patient and the following topics were discussed    1. Patients personal risk factors for stroke are hypertension, smoking, hyperlipidemia, diabetes mellitus, elevated HgA1C and CHF    2. Warning signs of Stroke:        * Sudden numbness or weakness of the face, arm or leg, especially on one side of          The body            * Sudden confusion, trouble speaking or understanding        * Sudden trouble seeing in one or both eyes        * Sudden trouble walking, dizziness, loss of balance or coordination        * Sudden severe headache with no known cause      3. Importance of activation Emergency Medical Services ( 9-1-1 ) immediately if experience any warning signs of stroke. 4. Be sure and schedule a follow-up appointment with your primary care doctor or any specialists as instructed. 5. You must take medicine every day to treat your risk factors for stroke. Be sure to take your medicines exactly as your doctor tells you: no more, no less. Know what your medicines are for , what they do. Anti-thrombotics /anticoagulants can help prevent strokes. You are taking the following medicine(s)  aspirin 81 mg    6. Smoking and second-hand smoke greatly increase your risk of stroke, cardiovascular disease and death. Smoking history packs, 1/4 packs per day    7.  Information provided was [facility] Stroke Education Binder    8. Documentation of teaching completed in Patient Education Activity and on Care Plan with teaching response noted? Yes    1537: Bedside shift change report given to Tia COHEN (oncoming nurse) by Nasim Enriquez (offgoing nurse). Report included the following information SBAR, Kardex, Procedure Summary, Intake/Output, MAR, Accordion and Cardiac Rhythm NSR.

## 2021-09-01 NOTE — PROGRESS NOTES
This is a 43-year-old white male with a history of type 2 diabetes mellitus times many years, history of heart failure status post defibrillator placement, and cervical spondylosis who was admitted with new onset right-sided weakness. The patient has a significant history for cervical disease and recently underwent C-spine surgery in June of this year. He had acute onset of right upper extremity weakness and was admitted to the emergency room for evaluation and management. Over the last 24 hours, he feels he has begun to improve function in his right hand. His appetite is good and he has been eating well. Examination  Blood pressure 124/77  Pulse 74  Afebrile  100% on room air  HEENT there is a right facial droop  The remainder of the examination is unchanged    Recent laboratory data  Glucose levels have ranged from   BUN 22  Creatinine 1.91    Impression  1. Type 2 diabetes mellitus with some deterioration in glucose control with a recent A1c of 8.7% which is up from 7.2%  2. Acute left CVA    Plan:  1. Continue glargine at 50 units daily  2. Correction insulin if needed  3. I did have an extensive conversation with him about resumption of his home medications on discharge. Given the beneficial effects of GLP-1 agonists and cardiovascular and cerebrovascular disease, I think restarting his Ozempic and continuing the [place] are quite reasonable. He has agreed to this strategy.     Total time 25 minutes

## 2021-09-02 ENCOUNTER — HOSPITAL ENCOUNTER (OUTPATIENT)
Dept: MRI IMAGING | Age: 66
Discharge: HOME | End: 2021-09-02
Attending: INTERNAL MEDICINE
Payer: COMMERCIAL

## 2021-09-02 VITALS
DIASTOLIC BLOOD PRESSURE: 77 MMHG | OXYGEN SATURATION: 99 % | HEART RATE: 85 BPM | SYSTOLIC BLOOD PRESSURE: 132 MMHG | RESPIRATION RATE: 18 BRPM | TEMPERATURE: 97.4 F | WEIGHT: 249.4 LBS | HEIGHT: 72 IN | BODY MASS INDEX: 33.78 KG/M2

## 2021-09-02 LAB
GLUCOSE BLD STRIP.AUTO-MCNC: 128 MG/DL (ref 65–117)
GLUCOSE BLD STRIP.AUTO-MCNC: 133 MG/DL (ref 65–117)
GLUCOSE BLD STRIP.AUTO-MCNC: 90 MG/DL (ref 65–117)
SERVICE CMNT-IMP: ABNORMAL
SERVICE CMNT-IMP: ABNORMAL
SERVICE CMNT-IMP: NORMAL

## 2021-09-02 PROCEDURE — 74011250636 HC RX REV CODE- 250/636: Performed by: INTERNAL MEDICINE

## 2021-09-02 PROCEDURE — 72141 MRI NECK SPINE W/O DYE: CPT

## 2021-09-02 PROCEDURE — 99213 OFFICE O/P EST LOW 20 MIN: CPT | Performed by: PSYCHIATRY & NEUROLOGY

## 2021-09-02 PROCEDURE — 70551 MRI BRAIN STEM W/O DYE: CPT

## 2021-09-02 PROCEDURE — 82962 GLUCOSE BLOOD TEST: CPT

## 2021-09-02 PROCEDURE — 74011250637 HC RX REV CODE- 250/637: Performed by: PSYCHIATRY & NEUROLOGY

## 2021-09-02 PROCEDURE — 74011250637 HC RX REV CODE- 250/637: Performed by: INTERNAL MEDICINE

## 2021-09-02 PROCEDURE — 99218 HC RM OBSERVATION: CPT

## 2021-09-02 PROCEDURE — 96372 THER/PROPH/DIAG INJ SC/IM: CPT

## 2021-09-02 RX ORDER — ROSUVASTATIN CALCIUM 10 MG/1
10 TABLET, COATED ORAL
Qty: 30 TABLET | Refills: 0 | Status: SHIPPED | OUTPATIENT
Start: 2021-09-02 | End: 2021-10-02

## 2021-09-02 RX ORDER — GUAIFENESIN 100 MG/5ML
81 LIQUID (ML) ORAL DAILY
Qty: 30 TABLET | Refills: 0 | Status: SHIPPED | OUTPATIENT
Start: 2021-09-03 | End: 2021-10-03

## 2021-09-02 RX ADMIN — CARVEDILOL 6.25 MG: 12.5 TABLET, FILM COATED ORAL at 18:57

## 2021-09-02 RX ADMIN — ASPIRIN 81 MG: 81 TABLET, CHEWABLE ORAL at 09:42

## 2021-09-02 RX ADMIN — Medication 10 ML: at 13:13

## 2021-09-02 RX ADMIN — ENOXAPARIN SODIUM 40 MG: 40 INJECTION SUBCUTANEOUS at 13:12

## 2021-09-02 RX ADMIN — CARVEDILOL 6.25 MG: 12.5 TABLET, FILM COATED ORAL at 09:42

## 2021-09-02 NOTE — PROGRESS NOTES
[facility] Neurology Clinics and [address] at [facility] Neurology Clinics at [address] [facility], [address] [place] [address] [address]   [phone]              Chief Complaint   Patient presents with    Extremity Weakness     Right hand, Right foot     Current Facility-Administered Medications   Medication Dose Route Frequency Provider Last Rate Last Admin    aspirin chewable tablet 81 mg  81 mg Oral DAILY Venecia Macdonald MD   81 mg at 09/02/21 0942    rosuvastatin (CRESTOR) tablet 20 mg  20 mg Oral QHS Venecia Macdonald MD   20 mg at 09/01/21 2148    carvediloL (COREG) tablet 6.25 mg  6.25 mg Oral BID Aubrey Lopez MD   6.25 mg at 09/02/21 2254    DULoxetine (CYMBALTA) capsule 120 mg  120 mg Oral QHS Aubrey Lopez MD   120 mg at 09/01/21 2148    insulin glargine (LANTUS) injection 50 Units  50 Units SubCUTAneous QHS Aubrey Lopez MD   50 Units at 09/01/21 2148    rOPINIRole (REQUIP) tablet 1 mg  1 mg Oral QHS Aubrey Lopez MD   1 mg at 09/01/21 2031    glucose chewable tablet 16 g  4 Tablet Oral PRN Aubrey Lopez MD        dextrose (D50W) injection syrg 12.5-25 g  25-50 mL IntraVENous PRN Aubrey Lopez MD        glucagon (GLUCAGEN) injection 1 mg  1 mg IntraMUSCular PRN Aubrey Lopez MD        sodium chloride (NS) flush 5-40 mL  5-40 mL IntraVENous Q8H Aubrey Lopez MD   10 mL at 09/01/21 2200    sodium chloride (NS) flush 5-40 mL  5-40 mL IntraVENous PRN Aubrey Lopez MD        acetaminophen (TYLENOL) tablet 650 mg  650 mg Oral Q6H PRN Aubrey Lopez MD        Or    acetaminophen (TYLENOL) suppository 650 mg  650 mg Rectal Q6H PRN Aubrey Lopez MD        polyethylene glycol (MIRALAX) packet 17 g  17 g Oral DAILY PRN Aubrey Lopez MD        ondansetron (ZOFRAN ODT) tablet 4 mg  4 mg Oral Q8H PRN Aubrey Lopez MD        Or    ondansetron [facility]) injection 4 mg  4 mg IntraVENous Q6H PRN Leti Tripp MD        insulin lispro (HUMALOG) injection   SubCUTAneous AC&HS Leti Tripp MD        enoxaparin (LOVENOX) injection 40 mg  40 mg SubCUTAneous Q24H Leti Tripp MD   40 mg at 09/01/21 1230      Allergies   Allergen Reactions    Penicillins Hives     Social History     Tobacco Use    Smoking status: Light Tobacco Smoker     Packs/day: 0.25     Years: 20.00     Pack years: 5.00    Smokeless tobacco: Never Used    Tobacco comment: cigars   Substance Use Topics    Alcohol use: Not Currently     Alcohol/week: 3.3 standard drinks     Types: 4 Cans of beer per week    Drug use: No     81-year-old gentleman we are seeing in neurologic consultation for right-sided weakness question stroke. Events since yesterday have included difficulty in getting his pacemaker cleared for MRI and this is not secondary to a lack of the technologist trying. See his note. Yesterday I placed him back on aspirin 81 mg. Increase Crestor to a dose of 20 mg. This morning he indicates he voices urine some increased movement in the right hand particularly down. He clears up the confusion about the MRI. He notes that he has his electrophysiologist Dr. Remington Mendez who moved his office 2 years ago. He has been in contact with that office and confirm the MRI form has been received and the physician is completing it now to return back. Tolerating medicines. Examination  Visit Vitals  /82 (BP 1 Location: Right arm, BP Patient Position: At rest)   Pulse 82   Temp 97.3 °F (36.3 °C)   Resp 18   Ht 6' (1.829 m)   Wt 113.1 kg (249 lb 6.4 oz)   SpO2 99%   BMI 33.82 kg/m²   Pleasant gentleman. Awake alert oriented and conversant. Normal speech and language. Right-sided facial droop.   Pronation of the right upper extremity      Impression/Plan  81-year-old gentleman with right-sided weakness question stroke  Await MRI  Remainder of work-up unremarkable  Reinitiated aspirin as he was not on that prior to his event  Crestor 20 mg daily to push LDL below 70  Modify modifiable risk factors including blood pressure, cholesterol and other lifestyle habits such as diet etc.  Follow in the office    Fede Pisano MD        This note was created using voice recognition software. Despite editing, there may be syntax errors.

## 2021-09-02 NOTE — DISCHARGE SUMMARY
Physician Discharge Summary     Patient ID:  Mahogany Ornelas  [id number]  73 y.o.  1955    Admit date: 8/31/2021    Discharge date of service and time: 9/2/2021    Admission Diagnoses: Right sided weakness [R53.1]    Discharge Diagnoses:    Principal Diagnosis   Right sided weakness                                             Hospital Course and other diagnoses  Right sided weakness - Unremarkable tele. Negative TIA workup. Neuro consulted and neurochecks. [facility] delayed significantly by cardiology clearance ICD. Prior ECHO heart without PFO. Check , TSH normal and A1c 8.7. Start ASA, crestor and lovenox.  Fall precautions and PT/OT eval.     Cervical stenosis of spinal canal - POA and may be another reason for the R side weakness.  MRI as above. Cervical MRI.  Consulted orthopedics Dr Rachel Rae. Outpatient follow up     CHF (congestive heart failure), NYHA class I, chronic, diastolic / ICD (implantable cardioverter-defibrillator) in place / HTN (hypertension) - POA, appears stable.  Cardiology consulted to define ICD.  Resume coreg and rosuvastatin, resume bumex at home.     Type II diabetes mellitus - Diabetic diet and counseling.  SSI per protocol.  Continue home long acting insulin, on discharge resume PO meds. A1c 8.7.     Obese / Sleep apnea - advise kam loss and CEASAR evaluation.     CKD (chronic kidney disease) stage 3, GFR 30-59 ml/min - Monitor in AM     Restless leg syndrome - Requip     Anxiety and depression - Continue duloxetine     PCP: Dina Coles MD    Consults: Cardiology and Neurology    Significant Diagnostic Studies: See Hospital Course    Discharged home in improved condition.     Discharge Exam:  /82 (BP 1 Location: Right arm, BP Patient Position: At rest)   Pulse 82   Temp 97.3 °F (36.3 °C)   Resp 18   Ht 6' (1.829 m)   Wt 113.1 kg (249 lb 6.4 oz)   SpO2 99%   BMI 33.82 kg/m²      Gen:  Well-developed, well-nourished, in no acute distress  HEENT:  Pink conjunctivae, PERRL, hearing intact to voice, moist mucous membranes  Neck:  Supple, without masses, thyroid non-tender  Resp:  No accessory muscle use, clear breath sounds without wheezes rales or rhonchi  Card:  No murmurs, normal S1, S2 without thrills, bruits or peripheral edema  Abd:  Soft, non-tender, non-distended, normoactive bowel sounds are present, no mass  Lymph:  No cervical or inguinal adenopathy  Musc:  No cyanosis or clubbing  Skin:  No rashes or ulcers, skin turgor is good  Neuro:  Cranial nerves are grossly intact, R hand motor weakness, follows commands appropriately  Psych:  Good insight, oriented to person, place and time, alert    Patient Instructions:   Current Discharge Medication List      START taking these medications    Details   aspirin 81 mg chewable tablet Take 1 Tablet by mouth daily for 30 days. Qty: 30 Tablet, Refills: 0         CONTINUE these medications which have CHANGED    Details   rosuvastatin (CRESTOR) 10 mg tablet Take 1 Tablet by mouth nightly for 30 days. Qty: 30 Tablet, Refills: 0         CONTINUE these medications which have NOT CHANGED    Details   losartan (COZAAR) 50 mg tablet Take 50 mg by mouth nightly. Pt does not recall dose       DULoxetine (Cymbalta) 60 mg capsule Take 120 mg by mouth nightly. insulin degludec Master Makayla FlexTouch U-100) 100 unit/mL (3 mL) inpn 50 Units by SubCUTAneous route every evening. empagliflozin (Jardiance) 25 mg tablet Take 25 mg by mouth nightly. carvediloL (Coreg) 6.25 mg tablet Take 6.25 mg by mouth two (2) times a day. bumetanide (BUMEX) 1 mg tablet Take 1 mg by mouth every evening. rOPINIRole (Requip) 1 mg tablet Take 1 mg by mouth nightly. Activity: Activity as tolerated and PT/OT per Home Health  Diet: Cardiac Diet, Diabetic Diet and Low fat, Low cholesterol  Wound Care: None needed    Follow-up with your PCP, neurology and cardiology in a few weeks.   Follow-up tests/labs - none    Signed:  Salima Napier MD  9/2/2021  1:36 PM

## 2021-09-02 NOTE — PROGRESS NOTES
MRI imaging in process, this RN also at  MRI unit to monitor patient. Pacemaker has been turned off by [place]. Saturation on RA = %, heart rate 80-88, sinus rhythm with occasional PVC's. Lowest HR fluctuated at 71.

## 2021-09-02 NOTE — PROGRESS NOTES
[employer] to find Serial number and to find if they have info on what MD placed this device. Given name of Dr Dorinda Zheng at [facility]. Contact information found and MRI sheet with info given to Hospitalist RN who says she will follow up in an attempt to get Dr Dorinda Zheng to fill out form for ICD MRI. 1142: Per Hospitalist RN advised fax arrived in radiology from Cardiologist who approves MRI.

## 2021-09-02 NOTE — PROGRESS NOTES
Problem: Ischemic Stroke: Discharge Outcomes  Goal: *Verbalizes anxiety and depression are reduced or absent  Outcome: Progressing Towards Goal  Goal: *Verbalize understanding of risk factor modification(Stroke Metric)  Outcome: Progressing Towards Goal  Goal: *Hemodynamically stable  Outcome: Progressing Towards Goal  Goal: *Absence of aspiration pneumonia  Outcome: Progressing Towards Goal  Goal: *Aware of needed dietary changes  Outcome: Progressing Towards Goal  Goal: *Verbalize understanding of prescribed medications including anti-coagulants, anti-lipid, and/or anti-platelets(Stroke Metric)  Outcome: Progressing Towards Goal  Goal: *Tolerating diet  Outcome: Progressing Towards Goal  Goal: *Aware of follow-up diagnostics related to anticoagulants  Outcome: Progressing Towards Goal

## 2021-09-02 NOTE — DISCHARGE INSTRUCTIONS
Patient Discharge Instructions    Jorge L Patel / [id number] : 1955    Admitted 2021 Discharged: 2021     Primary Diagnoses  Problem List as of 2021 Date Reviewed: 2021         * (Principal) Right sided weakness   Restless leg syndrome   ICD (implantable cardioverter-defibrillator) in place   Anxiety and depression   CHF (congestive heart failure), NYHA class I, chronic, diastolic ([facility])   Cervical stenosis of spinal canal   Sleep apnea   CKD (chronic kidney disease) stage 3, GFR 30-59 ml/min ([facility])   Type II diabetes mellitus ([facility] [address]   HTN (hypertension) (Chronic)          Take Home Medications     · It is important that you take the medication exactly as they are prescribed. · Keep your medication in the bottles provided by the pharmacist and keep a list of the medication names, dosages, and times to be taken in your wallet. · Do not take other medications without consulting your doctor. What to do at Home    Recommended diet: Cardiac diabetic Diet and Low fat, Low cholesterol    Recommended activity: Activity as tolerated and PT/OT per Home Health    If you experience worse symptoms, please follow up with neurology or orthopedics. Follow-up with your PCP in a few weeks        Information obtained by :  I understand that if any problems occur once I am at home I am to contact my physician. I understand and acknowledge receipt of the instructions indicated above.                                                                                                                                            Physician's or R.N.'s Signature                                                                  Date/Time                                                                                                                                              Patient or Representative Signature                                                          Date/Time

## 2021-09-02 NOTE — PROGRESS NOTES
Hospital Follow Up scheduled for 9/7/2021 at 8:00 am with PCP Tisha Lyman MD.  Practice is down 4 doctors.

## 2021-09-02 NOTE — PROGRESS NOTES
Discharge instructions discussed with patient and spouse Verbalized understanding. New prescribed medications discussed. Opportunity to ask questions given. IV removed. Signature obtained. Copies given.

## 2021-09-02 NOTE — PROGRESS NOTES
ORTHO PROGRESS NOTE      SUBJECTIVE:  Diane Bella notes his right thumb AROM is improving. He denies neck pain and arm/hand pain. He also notes some atrophy left hand. No brain/cervical MRI thus far. OBJECTIVE:  Patient Vitals for the past 24 hrs:   Temp Pulse BP   09/02/21 1034 97.3 °F (36.3 °C) 82 139/82   09/02/21 0807 97.3 °F (36.3 °C) 78 133/62   09/02/21 0700 -- 83 --   09/02/21 0407 97.5 °F (36.4 °C) 85 139/79   09/01/21 2354 97.6 °F (36.4 °C) 78 (!) 148/79   09/01/21 2244 -- 81 --   09/01/21 2035 98.5 °F (36.9 °C) 78 (!) 149/97   09/01/21 1638 98 °F (36.7 °C) 84 (!) 152/85   09/01/21 1500 -- 84 --       Alert, no distress. Lying in bed. No family present. Respirations unlabored. Moves neck spontaneously without pain. RUE obvious dorsal hand atrophy 1st webspace. Decreased sens ulnar nerve distribution. OK thumb abduction and opposition. Weak adduction. CR < 2 secs. Neg Hoffmans. No pain elbow palpation. LUE potential early atrophy dorsal 1st webspace. Neg Hoffmans. BLE str 5/5 and no clonus. Recent Labs     09/01/21  0220 08/31/21  0925 08/31/21  0925   HGB 13.9   < > 14.5   HCT 42.3   < > 42.8      < > 219   INR  --   --  1.1   BUN 22*   < > 26*   CREA 0.91   < > 0.97   GFRAA >60   < > >60   GFRNA >60   < > >60    < > = values in this interval not displayed. PT/OT:   Gait:  Gait  Ambulation - Level of Assistance: Independent  Distance (ft): 250 Feet (ft)  Assistive Device: Gait belt                 ASSESSMENT:  Painless right hand (thumb) weakness. Recent ACDF June 2021. PLAN:  I have low suspicion of spine etiology of weakness given lack of pain. Will MRI cervical spine to be thorough. Can be completed as outpatient from Ortho perspective. Will need EMG as outpatient. Will need FU Dr. Bib Szymanski (spine), Dr. Keerthi Springer (hand), and neuro to complete work-up of weakness/atrophy. Should cont AROM and strengthening digits/hand as treva.     DUNG Chowdhury  Orthopedic Trauma Service  [facility]

## 2021-09-02 NOTE — PROGRESS NOTES
Problem: Diabetes Self-Management  Goal: *Disease process and treatment process  Description: Define diabetes and identify own type of diabetes; list 3 options for treating diabetes. Outcome: Resolved/Met  Goal: *Incorporating nutritional management into lifestyle  Description: Describe effect of type, amount and timing of food on blood glucose; list 3 methods for planning meals. Outcome: Resolved/Met  Goal: *Incorporating physical activity into lifestyle  Description: State effect of exercise on blood glucose levels. Outcome: Resolved/Met  Goal: *Developing strategies to promote health/change behavior  Description: Define the ABC's of diabetes; identify appropriate screenings, schedule and personal plan for screenings. Outcome: Resolved/Met  Goal: *Using medications safely  Description: State effect of diabetes medications on diabetes; name diabetes medication taking, action and side effects. Outcome: Resolved/Met  Goal: *Monitoring blood glucose, interpreting and using results  Description: Identify recommended blood glucose targets  and personal targets. Outcome: Resolved/Met  Goal: *Prevention, detection, treatment of acute complications  Description: List symptoms of hyper- and hypoglycemia; describe how to treat low blood sugar and actions for lowering  high blood glucose level. Outcome: Resolved/Met  Goal: *Prevention, detection and treatment of chronic complications  Description: Define the natural course of diabetes and describe the relationship of blood glucose levels to long term complications of diabetes.   Outcome: Resolved/Met  Goal: *Developing strategies to address psychosocial issues  Description: Describe feelings about living with diabetes; identify support needed and support network  Outcome: Resolved/Met  Goal: *Insulin pump training  Outcome: Resolved/Met  Goal: *Sick day guidelines  Outcome: Resolved/Met  Goal: *Patient Specific Goal (EDIT GOAL, INSERT TEXT)  Outcome: Resolved/Met Problem: Patient Education: Go to Patient Education Activity  Goal: Patient/Family Education  Outcome: Resolved/Met     Problem: Patient Education: Go to Patient Education Activity  Goal: Patient/Family Education  Outcome: Resolved/Met     Problem: TIA/CVA Stroke: 0-24 hours  Goal: Off Pathway (Use only if patient is Off Pathway)  Outcome: Resolved/Met  Goal: Activity/Safety  Outcome: Resolved/Met  Goal: Consults, if ordered  Outcome: Resolved/Met  Goal: Diagnostic Test/Procedures  Outcome: Resolved/Met  Goal: Nutrition/Diet  Outcome: Resolved/Met  Goal: Discharge Planning  Outcome: Resolved/Met  Goal: Medications  Outcome: Resolved/Met  Goal: Respiratory  Outcome: Resolved/Met  Goal: Treatments/Interventions/Procedures  Outcome: Resolved/Met  Goal: Minimize risk of bleeding post-thrombolytic infusion  Outcome: Resolved/Met  Goal: Monitor for complications post-thrombolytic infusion  Outcome: Resolved/Met  Goal: Psychosocial  Outcome: Resolved/Met  Goal: *Hemodynamically stable  Outcome: Resolved/Met  Goal: *Neurologically stable  Description: Absence of additional neurological deficits    Outcome: Resolved/Met  Goal: *Verbalizes anxiety and depression are reduced or absent  Outcome: Resolved/Met  Goal: *Absence of Signs of Aspiration on Current Diet  Outcome: Resolved/Met  Goal: *Absence of deep venous thrombosis signs and symptoms(Stroke Metric)  Outcome: Resolved/Met  Goal: *Ability to perform ADLs and demonstrates progressive mobility and function  Outcome: Resolved/Met  Goal: *Stroke education started(Stroke Metric)  Outcome: Resolved/Met  Goal: *Dysphagia screen performed(Stroke Metric)  Outcome: Resolved/Met  Goal: *Rehab consulted(Stroke Metric)  Outcome: Resolved/Met     Problem: TIA/CVA Stroke: Day 2 Until Discharge  Goal: Off Pathway (Use only if patient is Off Pathway)  Outcome: Resolved/Met  Goal: Activity/Safety  Outcome: Resolved/Met  Goal: Diagnostic Test/Procedures  Outcome: Resolved/Met  Goal: Nutrition/Diet  Outcome: Resolved/Met  Goal: Discharge Planning  Outcome: Resolved/Met  Goal: Medications  Outcome: Resolved/Met  Goal: Respiratory  Outcome: Resolved/Met  Goal: Treatments/Interventions/Procedures  Outcome: Resolved/Met  Goal: Psychosocial  Outcome: Resolved/Met  Goal: *Verbalizes anxiety and depression are reduced or absent  Outcome: Resolved/Met  Goal: *Absence of aspiration  Outcome: Resolved/Met  Goal: *Absence of deep venous thrombosis signs and symptoms(Stroke Metric)  Outcome: Resolved/Met  Goal: *Optimal pain control at patient's stated goal  Outcome: Resolved/Met  Goal: *Tolerating diet  Outcome: Resolved/Met  Goal: *Ability to perform ADLs and demonstrates progressive mobility and function  Outcome: Resolved/Met  Goal: *Stroke education continued(Stroke Metric)  Outcome: Resolved/Met     Problem: Ischemic Stroke: Discharge Outcomes  Goal: *Verbalizes anxiety and depression are reduced or absent  9/2/2021 1902 by Liz Palumbo RN  Outcome: Resolved/Met  9/2/2021 1201 by Liz Palumbo RN  Outcome: Progressing Towards Goal  Goal: *Verbalize understanding of risk factor modification(Stroke Metric)  9/2/2021 1902 by Liz Palumbo RN  Outcome: Resolved/Met  9/2/2021 1201 by Liz Palumbo RN  Outcome: Progressing Towards Goal  Goal: *Hemodynamically stable  9/2/2021 1902 by Liz Palumbo RN  Outcome: Resolved/Met  9/2/2021 1201 by Liz Palumbo RN  Outcome: Progressing Towards Goal  Goal: *Absence of aspiration pneumonia  9/2/2021 1902 by Liz Palumbo RN  Outcome: Resolved/Met  9/2/2021 1201 by Liz Palumbo RN  Outcome: Progressing Towards Goal  Goal: *Aware of needed dietary changes  9/2/2021 1902 by Liz Palumbo RN  Outcome: Resolved/Met  9/2/2021 1201 by Liz Palumbo RN  Outcome: Progressing Towards Goal  Goal: *Verbalize understanding of prescribed medications including anti-coagulants, anti-lipid, and/or anti-platelets(Stroke Metric)  9/2/2021 1902 by Ruben Tang RN  Outcome: Resolved/Met  9/2/2021 1201 by Ruben Tang RN  Outcome: Progressing Towards Goal  Goal: *Tolerating diet  9/2/2021 1902 by Ruben Tang RN  Outcome: Resolved/Met  9/2/2021 1201 by Ruben Tang RN  Outcome: Progressing Towards Goal  Goal: *Aware of follow-up diagnostics related to anticoagulants  9/2/2021 1902 by Ruben Tang RN  Outcome: Resolved/Met  9/2/2021 1201 by Ruben Tang RN  Outcome: Progressing Towards Goal  Goal: *Ability to perform ADLs and demonstrates progressive mobility and function  Outcome: Resolved/Met  Goal: *Absence of DVT(Stroke Metric)  Outcome: Resolved/Met  Goal: *Absence of aspiration  Outcome: Resolved/Met  Goal: *Optimal pain control at patient's stated goal  Outcome: Resolved/Met  Goal: *Home safety concerns addressed  Outcome: Resolved/Met  Goal: *Describes available resources and support systems  Outcome: Resolved/Met  Goal: *Verbalizes understanding of activation of EMS(911) for stroke symptoms(Stroke Metric)  Outcome: Resolved/Met  Goal: *Understands and describes signs and symptoms to report to providers(Stroke Metric)  Outcome: Resolved/Met  Goal: *Neurolgocially stable (absence of additional neurological deficits)  Outcome: Resolved/Met  Goal: *Verbalizes importance of follow-up with primary care physician(Stroke Metric)  Outcome: Resolved/Met  Goal: *Smoking cessation discussed,if applicable(Stroke Metric)  Outcome: Resolved/Met  Goal: *Depression screening completed(Stroke Metric)  Outcome: Resolved/Met

## 2021-09-02 NOTE — PROGRESS NOTES
[facility]    Medical Progress Note      NAME: Esequiel Melo   :  1955  MRM:  [id number]    Date/Time of service 2021  1:29 PM          Assessment and Plan:     Right sided weakness - Unremarkable tele. Negative TIA workup. Neuro consulted and neurochecks. MRI brain delayed significantly by cardiology clearance ICD. Prior ECHO heart without PFO. Check , TSH normal and A1c 8.7. Start ASA, crestor and lovenox. Fall precautions and PT/OT eval.     Cervical stenosis of spinal canal - POA and may be another reason for the R side weakness. MRI as above. Cervical MRI. Ananya Perez. Outpatient follow up     CHF (congestive heart failure), NYHA class I, chronic, diastolic / ICD (implantable cardioverter-defibrillator) in place / HTN (hypertension) - POA, appears stable. Cardiology consulted to define ICD. Resume coreg and rosuvastatin, resume bumex at home.     Type II diabetes mellitus - Diabetic diet and counseling. SSI per protocol. Continue home long acting insulin, on discharge resume PO meds. A1c 8.7.     Obese / Sleep apnea - advise kam loss and CEASAR evaluation.     CKD (chronic kidney disease) stage 3, GFR 30-59 ml/min - Monitor in AM     Restless leg syndrome - Requip     Anxiety and depression - Continue duloxetine       Subjective:     Chief Complaint:  Feels fine, awaiting MRI    ROS:  (bold if positive, if negative)    Tolerating PT  Tolerating Diet        Objective:     Last 24hrs VS reviewed since prior progress note.  Most recent are:    Visit Vitals  /82 (BP 1 Location: Right arm, BP Patient Position: At rest)   Pulse 82   Temp 97.3 °F (36.3 °C)   Resp 18   Ht 6' (1.829 m)   Wt 113.1 kg (249 lb 6.4 oz)   SpO2 99%   BMI 33.82 kg/m²     SpO2 Readings from Last 6 Encounters:   21 99%   21 97%   21 100%   21 97%   10/16/20 95%   14 93%            Intake/Output Summary (Last 24 hours) at 2021 0459  Last data filed at 9/1/2021 2354  Gross per 24 hour   Intake 540 ml   Output --   Net 540 ml        Physical Exam:    Gen:  Well-developed, well-nourished, in no acute distress  HEENT:  Pink conjunctivae, PERRL, hearing intact to voice, moist mucous membranes  Neck:  Supple, without masses, thyroid non-tender  Resp:  No accessory muscle use, clear breath sounds without wheezes rales or rhonchi  Card:  No murmurs, normal S1, S2 without thrills, bruits or peripheral edema  Abd:  Soft, non-tender, non-distended, normoactive bowel sounds are present, no mass  Lymph:  No cervical or inguinal adenopathy  Musc:  No cyanosis or clubbing  Skin:  No rashes or ulcers, skin turgor is good  Neuro:  Cranial nerves are grossly intact, R hand motor weakness, follows commands appropriately  Psych:  Good insight, oriented to person, place and time, alert    Telemetry reviewed:   normal sinus rhythm  __________________________________________________________________  Medications Reviewed: (see below)  Medications:     Current Facility-Administered Medications   Medication Dose Route Frequency    aspirin chewable tablet 81 mg  81 mg Oral DAILY    rosuvastatin (CRESTOR) tablet 20 mg  20 mg Oral QHS    carvediloL (COREG) tablet 6.25 mg  6.25 mg Oral BID    DULoxetine (CYMBALTA) capsule 120 mg  120 mg Oral QHS    insulin glargine (LANTUS) injection 50 Units  50 Units SubCUTAneous QHS    rOPINIRole (REQUIP) tablet 1 mg  1 mg Oral QHS    glucose chewable tablet 16 g  4 Tablet Oral PRN    dextrose (D50W) injection syrg 12.5-25 g  25-50 mL IntraVENous PRN    glucagon (GLUCAGEN) injection 1 mg  1 mg IntraMUSCular PRN    sodium chloride (NS) flush 5-40 mL  5-40 mL IntraVENous Q8H    sodium chloride (NS) flush 5-40 mL  5-40 mL IntraVENous PRN    acetaminophen (TYLENOL) tablet 650 mg  650 mg Oral Q6H PRN    Or    acetaminophen (TYLENOL) suppository 650 mg  650 mg Rectal Q6H PRN    polyethylene glycol (MIRALAX) packet 17 g  17 g Oral DAILY PRN    ondansetron (ZOFRAN ODT) tablet 4 mg  4 mg Oral Q8H PRN    Or    ondansetron (ZOFRAN) injection 4 mg  4 mg IntraVENous Q6H PRN    insulin lispro (HUMALOG) injection   SubCUTAneous AC&HS    enoxaparin (LOVENOX) injection 40 mg  40 mg SubCUTAneous Q24H        Lab Data Reviewed: (see below)  Lab Review:     Recent Labs     09/01/21 0220 08/31/21 0925   WBC 8.2 8.4   HGB 13.9 14.5   HCT 42.3 42.8    219     Recent Labs     09/01/21 0220 08/31/21 0925    142   K 3.6 4.3   * 109*   CO2 25 30   * 162*   BUN 22* 26*   CREA 0.91 0.97   CA 8.3* 8.5   MG 2.2  --    ALB 3.2* 3.4*   TBILI 0.4 0.4   ALT 18 21   INR  --  1.1     Lab Results   Component Value Date/Time    Glucose (POC) 133 (H) 09/02/2021 11:02 AM    Glucose (POC) 90 09/02/2021 08:09 AM    Glucose (POC) 137 (H) 09/01/2021 08:33 PM    Glucose (POC) 107 09/01/2021 04:42 PM    Glucose (POC) 119 (H) 09/01/2021 11:51 AM     No results for input(s): PH, PCO2, PO2, HCO3, FIO2 in the last 72 hours. Recent Labs     08/31/21 0925   INR 1.1     All Micro Results     Procedure Component Value Units Date/Time    MRSA CULTURE [[id number]]     Order Status: Sent Specimen: Nasal from Nares           Other pertinent lab: none    Total time spent with patient: 30 Minutes I personally reviewed chart, notes, data and current medications in the medical record. I have personally examined and treated the patient at bedside during this period.                  Care Plan discussed with: Patient, Care Manager, Nursing Staff, Consultant/Specialist and >50% of time spent in counseling and coordination of care    Discussed:  Care Plan and D/C Planning    Prophylaxis:  H2B/PPI    Disposition:  Home w/Family           ___________________________________________________    Attending Physician: Francesco Fox MD

## 2021-09-02 NOTE — PROGRESS NOTES
This is a 28-year-old white male with a history of type 2 diabetes mellitus times many years, history of heart failure status post defibrillator placement, and cervical spondylosis who was admitted with new onset right-sided weakness.  The patient has a significant history for cervical disease and recently underwent C-spine surgery in June of this year. Yash Ortiz had acute onset of right upper extremity weakness and was admitted to the emergency room for evaluation and management.     Over the last 48 hours, he feels he has begun to improve function in his right hand. His appetite is good and he has been eating well. He is however frustrated that there is no consensus on whether he can or cannot have the MRI for completion of his neurologic evaluation. Examination  Blood pressure 139/82  Pulse 82  Afebrile  99% on room air  Right upper extremity with increasing range of motion including the thumb  There is persistent right facial droop    Blood sugars are doing spectacularly well on 50 units of Lantus only    Impression  1. Type 2 diabetes mellitus with excellent glucose control now on 50 units of Lantus daily  2. Left CVA    Recommendation  1. Continue glargine at 50 units daily  2. Correction insulin if needed  3. I did have an extensive conversation with him about resumption of his home medications on discharge. Given the beneficial effects of GLP-1 agonists and cardiovascular and cerebrovascular disease, I think restarting his Ozempic and continuing the Marnette Crea are quite reasonable. He has agreed to this strategy.     Total time 25 minutes    Please note that this dictation was completed with [employer], the computer voice recognition software. Quite often unanticipated grammatical, syntax, homophones, and other interpretive errors are inadvertently transcribed by the computer software. Please disregard these errors. Please excuse any errors that have escaped final proofreading.

## 2021-09-02 NOTE — PROGRESS NOTES
Cardiology Form has been faxed to 3 different doctors, and all 3 have faxed back the form saying they don't take care of the patient. Called office of [facility] and  said patient hasn't been seen in the office in years, and hasn't had a check since May of 2020. Velasquez Retana called nurse and told her that EP team in the hospital needed to be called so Dr. Cee Wilson can sign cardiology form. 1212 pm 09/01/21 NP for Dr. Cee Wilson called and I relayed information to her. She stated Dr. Cee Wilson won't sign the form either since patient is not one of Dr. Cee Wilson'. Forms have been faxed to and denied by Dr. Cassie Ledesma (originally cardiologist), Dr. Lenise Lennox (ER cardiologist during patient's stay), and Dr. Violet Pollack (who Dr. Cassie Ledesma' office recommended to send it to.) Form has also been denied by Dr. Cee Wilson.

## 2021-09-02 NOTE — PROGRESS NOTES
CM Note:  Pt to d/c to home today and plans to transport himself home. No CM needs. He will make his own arrangements for OP tx. He is to f/u with providers as scheduled.   VICKI Jeong

## 2022-03-19 PROBLEM — R53.1 RIGHT SIDED WEAKNESS: Status: ACTIVE | Noted: 2021-08-31

## 2022-03-19 PROBLEM — M48.02 CERVICAL STENOSIS OF SPINAL CANAL: Status: ACTIVE | Noted: 2021-06-08

## 2023-09-29 NOTE — PROGRESS NOTES
Bedside shift change report given to Sharona Randall RN (oncoming nurse) by Nellie Garcia RN (offgoing nurse). Report included the following information SBAR, Kardex and MAR. Satisfactory

## 2023-11-10 ENCOUNTER — HOSPITAL ENCOUNTER (OUTPATIENT)
Facility: HOSPITAL | Age: 68
End: 2023-11-10
Payer: MEDICARE

## 2023-11-10 DIAGNOSIS — M25.551 RIGHT HIP PAIN: ICD-10-CM

## 2023-11-10 DIAGNOSIS — M54.16 LUMBAR RADICULITIS: ICD-10-CM

## 2023-11-10 PROCEDURE — 72148 MRI LUMBAR SPINE W/O DYE: CPT

## 2024-01-27 ENCOUNTER — APPOINTMENT (OUTPATIENT)
Facility: HOSPITAL | Age: 69
End: 2024-01-27
Payer: MEDICARE

## 2024-01-27 ENCOUNTER — HOSPITAL ENCOUNTER (EMERGENCY)
Facility: HOSPITAL | Age: 69
Discharge: HOME | End: 2024-01-27
Attending: EMERGENCY MEDICINE
Payer: MEDICARE

## 2024-01-27 VITALS
BODY MASS INDEX: 33.86 KG/M2 | HEART RATE: 60 BPM | DIASTOLIC BLOOD PRESSURE: 52 MMHG | OXYGEN SATURATION: 98 % | WEIGHT: 250 LBS | RESPIRATION RATE: 16 BRPM | SYSTOLIC BLOOD PRESSURE: 171 MMHG | HEIGHT: 72 IN | TEMPERATURE: 98.3 F
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
## 2024-01-27 DIAGNOSIS — M25.511 ACUTE PAIN OF RIGHT SHOULDER: ICD-10-CM

## 2024-01-27 DIAGNOSIS — T68.XXXA HYPOTHERMIA, INITIAL ENCOUNTER: ICD-10-CM

## 2024-01-27 LAB
ALBUMIN SERPL-MCNC: 3 G/DL (ref 3.5–5)
ALBUMIN/GLOB SERPL: 0.9 (ref 1.1–2.2)
ALP SERPL-CCNC: 106 U/L (ref 45–117)
ALT SERPL-CCNC: 15 U/L (ref 12–78)
ANION GAP SERPL CALC-SCNC: 5 MMOL/L (ref 5–15)
APPEARANCE UR: CLEAR
AST SERPL-CCNC: 10 U/L (ref 15–37)
BACTERIA URNS QL MICRO: NEGATIVE /HPF
BASOPHILS # BLD: 0 K/UL (ref 0–0.1)
BASOPHILS NFR BLD: 0 % (ref 0–1)
BILIRUB SERPL-MCNC: 0.7 MG/DL (ref 0.2–1)
BILIRUB UR QL: NEGATIVE
BUN SERPL-MCNC: 22 MG/DL (ref 6–20)
BUN/CREAT SERPL: 23 (ref 12–20)
CALCIUM SERPL-MCNC: 9 MG/DL (ref 8.5–10.1)
CHLORIDE SERPL-SCNC: 110 MMOL/L (ref 97–108)
CK SERPL-CCNC: 57 U/L (ref 39–308)
CO2 SERPL-SCNC: 28 MMOL/L (ref 21–32)
COLOR UR: ABNORMAL
CREAT SERPL-MCNC: 0.95 MG/DL (ref 0.7–1.3)
DIFFERENTIAL METHOD BLD: NORMAL
EOSINOPHIL # BLD: 0.1 K/UL (ref 0–0.4)
EOSINOPHIL NFR BLD: 1 % (ref 0–7)
EPITH CASTS URNS QL MICRO: ABNORMAL /LPF
ERYTHROCYTE [DISTWIDTH] IN BLOOD BY AUTOMATED COUNT: 12.7 % (ref 11.5–14.5)
GLOBULIN SER CALC-MCNC: 3.4 G/DL (ref 2–4)
GLUCOSE SERPL-MCNC: 123 MG/DL (ref 65–100)
GLUCOSE UR STRIP.AUTO-MCNC: >1000 MG/DL
HCT VFR BLD AUTO: 41 % (ref 36.6–50.3)
HGB BLD-MCNC: 14.1 G/DL (ref 12.1–17)
HGB UR QL STRIP: NEGATIVE
HYALINE CASTS URNS QL MICRO: ABNORMAL /LPF (ref 0–2)
IMM GRANULOCYTES # BLD AUTO: 0 K/UL (ref 0–0.04)
IMM GRANULOCYTES NFR BLD AUTO: 0 % (ref 0–0.5)
KETONES UR QL STRIP.AUTO: ABNORMAL MG/DL
LEUKOCYTE ESTERASE UR QL STRIP.AUTO: NEGATIVE
LYMPHOCYTES # BLD: 1.4 K/UL (ref 0.8–3.5)
LYMPHOCYTES NFR BLD: 18 % (ref 12–49)
MCH RBC QN AUTO: 30.6 PG (ref 26–34)
MCHC RBC AUTO-ENTMCNC: 34.4 G/DL (ref 30–36.5)
MCV RBC AUTO: 88.9 FL (ref 80–99)
MONOCYTES # BLD: 0.4 K/UL (ref 0–1)
MONOCYTES NFR BLD: 6 % (ref 5–13)
NEUTS SEG # BLD: 5.6 K/UL (ref 1.8–8)
NEUTS SEG NFR BLD: 75 % (ref 32–75)
NITRITE UR QL STRIP.AUTO: NEGATIVE
NRBC # BLD: 0 K/UL (ref 0–0.01)
NRBC BLD-RTO: 0 PER 100 WBC
PH UR STRIP: 5 (ref 5–8)
PLATELET # BLD AUTO: 167 K/UL (ref 150–400)
PMV BLD AUTO: 10.1 FL (ref 8.9–12.9)
POTASSIUM SERPL-SCNC: 4 MMOL/L (ref 3.5–5.1)
PROCALCITONIN SERPL-MCNC: <0.05 NG/ML
PROT SERPL-MCNC: 6.4 G/DL (ref 6.4–8.2)
PROT UR STRIP-MCNC: ABNORMAL MG/DL
RBC # BLD AUTO: 4.61 M/UL (ref 4.1–5.7)
RBC #/AREA URNS HPF: ABNORMAL /HPF (ref 0–5)
SODIUM SERPL-SCNC: 143 MMOL/L (ref 136–145)
SP GR UR REFRACTOMETRY: 1.02 (ref 1–1.03)
TROPONIN I SERPL HS-MCNC: 13 NG/L (ref 0–76)
URINE CULTURE IF INDICATED: ABNORMAL
UROBILINOGEN UR QL STRIP.AUTO: 1 EU/DL (ref 0.2–1)
WBC # BLD AUTO: 7.5 K/UL (ref 4.1–11.1)
WBC URNS QL MICRO: ABNORMAL /HPF (ref 0–4)

## 2024-01-27 PROCEDURE — 82550 ASSAY OF CK (CPK): CPT

## 2024-01-27 PROCEDURE — 84145 PROCALCITONIN (PCT): CPT

## 2024-01-27 PROCEDURE — 99284 EMERGENCY DEPT VISIT MOD MDM: CPT

## 2024-01-27 PROCEDURE — 80053 COMPREHEN METABOLIC PANEL: CPT

## 2024-01-27 PROCEDURE — 81001 URINALYSIS AUTO W/SCOPE: CPT

## 2024-01-27 PROCEDURE — 36415 COLL VENOUS BLD VENIPUNCTURE: CPT

## 2024-01-27 PROCEDURE — 6370000000 HC RX 637 (ALT 250 FOR IP): Performed by: EMERGENCY MEDICINE

## 2024-01-27 PROCEDURE — 71045 X-RAY EXAM CHEST 1 VIEW: CPT

## 2024-01-27 PROCEDURE — 84484 ASSAY OF TROPONIN QUANT: CPT

## 2024-01-27 PROCEDURE — 87040 BLOOD CULTURE FOR BACTERIA: CPT

## 2024-01-27 PROCEDURE — 85025 COMPLETE CBC W/AUTO DIFF WBC: CPT

## 2024-01-27 PROCEDURE — 73030 X-RAY EXAM OF SHOULDER: CPT

## 2024-01-27 RX ORDER — HYDROCODONE BITARTRATE AND ACETAMINOPHEN 5; 325 MG/1; MG/1
1 TABLET ORAL EVERY 6 HOURS PRN
Qty: 11 TABLET | Refills: 0 | Status: SHIPPED | OUTPATIENT
Start: 2024-01-27 | End: 2024-01-30

## 2024-01-27 RX ORDER — DOCUSATE SODIUM 100 MG/1
CAPSULE, LIQUID FILLED ORAL
Qty: 20 CAPSULE | Refills: 0 | Status: SHIPPED | OUTPATIENT
Start: 2024-01-27

## 2024-01-27 RX ORDER — IBUPROFEN 600 MG/1
600 TABLET ORAL
Status: COMPLETED | OUTPATIENT
Start: 2024-01-27 | End: 2024-01-27

## 2024-01-27 RX ADMIN — IBUPROFEN 600 MG: 600 TABLET, FILM COATED ORAL at 16:45

## 2024-01-27 ASSESSMENT — PAIN DESCRIPTION - ORIENTATION: ORIENTATION: LEFT

## 2024-01-27 ASSESSMENT — PAIN DESCRIPTION - LOCATION: LOCATION: SHOULDER

## 2024-01-27 NOTE — ED TRIAGE NOTES
Patient is a 68 year old male complaining of fall down 5 steps at home. Patient states he tripped. Has hx of frequent falls. Patient denies LOC and blood thinners. Patient states he has R shoulder pain. Patient alert and oriented x4, in NAD.   
ILL APPEARING
DISPLAY PLAN FREE TEXT

## 2024-01-27 NOTE — ED PROVIDER NOTES
[facility] EMERGENCY DEPT  EMERGENCY DEPARTMENT ENCOUNTER      Pt Name: Umberto Hernandez  MRN: [id number]  Birthdate 1955  Date of evaluation: 1/27/2024  Provider: Yash Harper MD      HISTORY OF PRESENT ILLNESS      68-year-old male with history of anxiety, CHF, hypertension, diabetes with history of TBI with multiple falls due to balance issues presents to the emergency department after a fall last night.  He arrives by EMS.  He reports he fell onto his right shoulder.  Denies any head injury this time.    The history is provided by the patient, medical records and the EMS personnel.           Nursing Notes were reviewed.    REVIEW OF SYSTEMS         Review of Systems        PAST MEDICAL HISTORY     Past Medical History:   Diagnosis Date    Anxiety and depression     Carbuncle and furuncle of neck 2015    CHF (congestive heart failure), NYHA class I, chronic, diastolic (HCC)     COVID-19 vaccine series completed     Pfizer    Hypertension     ICD (implantable cardioverter-defibrillator) in place     Infection of skin due to methicillin resistant Staphylococcus aureus (MRSA) 2015    Restless leg syndrome     Type 2 diabetes mellitus (HCC)     Unspecified sleep apnea     lost weight         SURGICAL HISTORY       Past Surgical History:   Procedure Laterality Date    BREAST CYST INCISION AND DRAINAGE Right 10/13/2020    Hand-     CYST REMOVAL  2015    Boils off of back of neck- with infection    ORTHOPEDIC SURGERY      bone spur right foot.    PACEMAKER      AICD    SKIN GRAFT  2015    UROLOGICAL SURGERY      hydrocele repair left testicle    WISDOM TOOTH EXTRACTION           CURRENT MEDICATIONS       Previous Medications    BUMETANIDE (BUMEX) 1 MG TABLET    Take 1 mg by mouth every evening    CARVEDILOL (COREG) 6.25 MG TABLET    Take 6.25 mg by mouth 2 times daily    DULOXETINE (CYMBALTA) 60 MG EXTENDED RELEASE CAPSULE    Take 120 mg by mouth    EMPAGLIFLOZIN (JARDIANCE) 25 MG TABLET    Take 25 mg by mouth  nightly    INSULIN DEGLUDEC (TRESIBA FLEXTOUCH) 100 UNIT/ML SOPN    Inject 50 Units into the skin every evening    LOSARTAN (COZAAR) 50 MG TABLET    Take 50 mg by mouth    ROPINIROLE (REQUIP) 1 MG TABLET    Take 1 mg by mouth nightly       ALLERGIES     Penicillins    FAMILY HISTORY     No family history on file.       SOCIAL HISTORY       Social History     Socioeconomic History    Marital status:    Tobacco Use    Smoking status: Light Smoker     Current packs/day: 0.25     Types: Cigarettes    Smokeless tobacco: Never    Tobacco comments:     Quit smoking: cigars   Substance and Sexual Activity    Alcohol use: Not Currently     Alcohol/week: 3.3 standard drinks of alcohol    Drug use: No         PHYSICAL EXAM       ED Triage Vitals   BP Temp Temp src Pulse Resp SpO2 Height Weight   -- -- -- -- -- -- -- --       There is no height or weight on file to calculate BMI.    Physical Exam  Vitals and nursing note reviewed.   Constitutional:       Appearance: Normal appearance.   Musculoskeletal:         General: Tenderness (Right shoulder) present. No deformity.   Neurological:      General: No focal deficit present.      Mental Status: He is alert and oriented to person, place, and time. Mental status is at baseline.             EMERGENCY DEPARTMENT COURSE and DIFFERENTIAL DIAGNOSIS/MDM:   Vitals:  There were no vitals filed for this visit.      Medical Decision Making  68-year-old male presents to the emergency department above after a fall.  He has no traumatic injuries on my examination.  Do not require hospitalization.  He arrives hypothermic, I suspect this is due to having slept on the floor with without blankets.  Sepsis workup initiated without leukocytosis, no focal evidence of infection.  He is stable for discharge home.    Amount and/or Complexity of Data Reviewed  Labs: ordered.  Radiology: ordered and independent interpretation performed. Decision-making details documented in ED

## 2024-01-28 LAB
BACTERIA SPEC CULT: NORMAL
BACTERIA SPEC CULT: NORMAL
SERVICE CMNT-IMP: NORMAL
SERVICE CMNT-IMP: NORMAL

## 2024-02-02 LAB
BACTERIA SPEC CULT: NORMAL
BACTERIA SPEC CULT: NORMAL
SERVICE CMNT-IMP: NORMAL
SERVICE CMNT-IMP: NORMAL

## (undated) DEVICE — PACK,BASIC,SIRUS,V: Brand: MEDLINE

## (undated) DEVICE — C-ARM: Brand: UNBRANDED

## (undated) DEVICE — INSULATED BLADE ELECTRODE: Brand: EDGE

## (undated) DEVICE — 450 ML BOTTLE OF 0.05% CHLORHEXIDINE GLUCONATE IN 99.95% STERILE WATER FOR IRRIGATION, USP AND APPLICATOR.: Brand: IRRISEPT ANTIMICROBIAL WOUND LAVAGE

## (undated) DEVICE — TOOL 14MH30 LEGEND 14CM 3MM: Brand: MIDAS REX ™

## (undated) DEVICE — 1010 S-DRAPE TOWEL DRAPE 10/BX: Brand: STERI-DRAPE™

## (undated) DEVICE — SUTURE VCRL SZ 3-0 L27IN ABSRB UD L26MM SH 1/2 CIR J416H

## (undated) DEVICE — SUT ETHLN 4-0 18IN PS2 BLK --

## (undated) DEVICE — INFECTION CONTROL KIT SYS

## (undated) DEVICE — REM POLYHESIVE ADULT PATIENT RETURN ELECTRODE: Brand: VALLEYLAB

## (undated) DEVICE — BIT DRL L14MM DIA2.3MM CERV STP W/ EPOXY RNG DISP PYRENEES

## (undated) DEVICE — SWAB CULT DBL W/O CHAR RAYON TIP AMIES GEL CLMN FOR COLL

## (undated) DEVICE — ROCKER SWITCH PENCIL BLADE ELECTRODE, HOLSTER: Brand: EDGE

## (undated) DEVICE — CANISTER, RIGID, 3000CC: Brand: MEDLINE INDUSTRIES, INC.

## (undated) DEVICE — BANDAGE,GAUZE,BULKEE II,2.25"X3YD,STRL: Brand: MEDLINE

## (undated) DEVICE — STRIP,CLOSURE,WOUND,MEDI-STRIP,1/2X4: Brand: MEDLINE

## (undated) DEVICE — SUTURE MCRYL SZ 4-0 L27IN ABSRB UD L19MM PS-2 1/2 CIR PRIM Y426H

## (undated) DEVICE — SPONGE: SPECIALTY PEANUT XR 100/CS: Brand: MEDICAL ACTION INDUSTRIES

## (undated) DEVICE — TIP CLEANER: Brand: VALLEYLAB

## (undated) DEVICE — DRAPE,CHEST,FENES,15X10,STERIL: Brand: MEDLINE

## (undated) DEVICE — CODMAN® SURGICAL PATTIES 3/4" X 3/4" (1.91CM X 1.91CM): Brand: CODMAN®

## (undated) DEVICE — STERILE POLYISOPRENE POWDER-FREE SURGICAL GLOVES: Brand: PROTEXIS

## (undated) DEVICE — COVER,MAYO STAND,STERILE: Brand: MEDLINE

## (undated) DEVICE — ACCY PA100-A LEGEND LUB/DIFFUSER 4 PACK: Brand: MIDAS REX®

## (undated) DEVICE — PAD,NON-ADHERENT,3X8,STERILE,LF,1/PK: Brand: MEDLINE

## (undated) DEVICE — BIPOLAR FORCEPS CORD: Brand: VALLEYLAB

## (undated) DEVICE — SPONGE GZ W4XL4IN COT 12 PLY TYP VII WVN C FLD DSGN

## (undated) DEVICE — PENROSE DRAIN 18 X .5" SILICONE: Brand: MEDLINE

## (undated) DEVICE — COVER LT HNDL PLAS RIG 1 PER PK

## (undated) DEVICE — APPLICATOR MEDICATED 10.5 CC SOLUTION HI LT ORNG CHLORAPREP

## (undated) DEVICE — COVER,TABLE,60X90,STERILE: Brand: MEDLINE

## (undated) DEVICE — HAND I-LF: Brand: MEDLINE INDUSTRIES, INC.

## (undated) DEVICE — BONE WAX WHITE: Brand: BONE WAX WHITE

## (undated) DEVICE — STERILE POLYISOPRENE POWDER-FREE SURGICAL GLOVES WITH EMOLLIENT COATING: Brand: PROTEXIS

## (undated) DEVICE — DRAPE MICSCP W46XL120IN FOR ZEISS MD FEATURING CLEARLENS

## (undated) DEVICE — DRAPE,REIN 53X77,STERILE: Brand: MEDLINE

## (undated) DEVICE — DRESSING,GAUZE,XEROFORM,CURAD,1"X8",ST: Brand: CURAD

## (undated) DEVICE — CATHETER IV 14GA L1.25IN TEF STR HUB INTROCAN SFTY

## (undated) DEVICE — COVER,MAYO STAND,XL,STERILE: Brand: MEDLINE

## (undated) DEVICE — STRAP,POSITIONING,KNEE/BODY,FOAM,4X60": Brand: MEDLINE

## (undated) DEVICE — TUBING, SUCTION, 1/4" X 10', STRAIGHT: Brand: MEDLINE

## (undated) DEVICE — 3M™ IOBAN™ 2 ANTIMICROBIAL INCISE DRAPE 6650EZ: Brand: IOBAN™ 2

## (undated) DEVICE — ALCOHOL RUBBING ISO 16OZ 70%

## (undated) DEVICE — DRAPE,UTILITY,TAPE,15X26,STERILE: Brand: MEDLINE

## (undated) DEVICE — DRAIN KT WND 10FR RND 400ML --

## (undated) DEVICE — SURGICAL PROCEDURE PACK BASIN MAJ SET CUST NO CAUT

## (undated) DEVICE — GOWN,SIRUS,NONRNF,SETINSLV,XL,20/CS: Brand: MEDLINE

## (undated) DEVICE — TAPE,CLOTH/SILK,CURAD,3"X10YD,LF,40/CS: Brand: CURAD

## (undated) DEVICE — SYR 20ML LL STRL LF --

## (undated) DEVICE — 3M™ TEGADERM™ TRANSPARENT FILM DRESSING FRAME STYLE, 1626W, 4 IN X 4-3/4 IN (10 CM X 12 CM), 50/CT 4CT/CASE: Brand: 3M™ TEGADERM™

## (undated) DEVICE — SOL IRR STRL H2O 1000ML BTL --

## (undated) DEVICE — GAUZE,PACKING STRIP,PLAIN,1/4"X5YD,STRL: Brand: CURAD

## (undated) DEVICE — BIPOLAR FORCEPS CORD,BANANA LEADS: Brand: VALLEYLAB

## (undated) DEVICE — SYR 5ML 1/5 GRAD LL NSAF LF --

## (undated) DEVICE — 3M™ TEGADERM™ TRANSPARENT FILM DRESSING FRAME STYLE, 1624W, 2-3/8 IN X 2-3/4 IN (6 CM X 7 CM), 100/CT 4CT/CASE: Brand: 3M™ TEGADERM™

## (undated) DEVICE — TRAY PREP DRY W/ PREM GLV 2 APPL 6 SPNG 2 UNDPD 1 OVERWRAP

## (undated) DEVICE — GAUZE,PACKING STRIP,IODOFORM,1/2"X5YD,ST: Brand: CURAD

## (undated) DEVICE — NDL SPNE QNCKE 18GX3.5IN LF --

## (undated) DEVICE — SOLUTION IV 1000ML 0.9% SOD CHL

## (undated) DEVICE — SEALANT HEMOSTAT W/THROM 8ML -- SURGIFLO MATRIX

## (undated) DEVICE — SOL IRR SOD CL 0.9% 1000ML BTL --

## (undated) DEVICE — MAGNETIC INSTR DRAPE 20X16: Brand: MEDLINE INDUSTRIES, INC.